# Patient Record
Sex: MALE | Race: WHITE | NOT HISPANIC OR LATINO | Employment: OTHER | URBAN - METROPOLITAN AREA
[De-identification: names, ages, dates, MRNs, and addresses within clinical notes are randomized per-mention and may not be internally consistent; named-entity substitution may affect disease eponyms.]

---

## 2020-03-20 ENCOUNTER — APPOINTMENT (INPATIENT)
Dept: MRI IMAGING | Facility: HOSPITAL | Age: 75
DRG: 065 | End: 2020-03-20
Payer: COMMERCIAL

## 2020-03-20 ENCOUNTER — APPOINTMENT (EMERGENCY)
Dept: CT IMAGING | Facility: HOSPITAL | Age: 75
DRG: 065 | End: 2020-03-20
Payer: COMMERCIAL

## 2020-03-20 ENCOUNTER — HOSPITAL ENCOUNTER (INPATIENT)
Facility: HOSPITAL | Age: 75
LOS: 5 days | DRG: 065 | End: 2020-03-25
Attending: EMERGENCY MEDICINE | Admitting: FAMILY MEDICINE
Payer: COMMERCIAL

## 2020-03-20 ENCOUNTER — APPOINTMENT (INPATIENT)
Dept: NON INVASIVE DIAGNOSTICS | Facility: HOSPITAL | Age: 75
DRG: 065 | End: 2020-03-20
Payer: COMMERCIAL

## 2020-03-20 ENCOUNTER — APPOINTMENT (EMERGENCY)
Dept: RADIOLOGY | Facility: HOSPITAL | Age: 75
DRG: 065 | End: 2020-03-20
Payer: COMMERCIAL

## 2020-03-20 DIAGNOSIS — R11.2 NAUSEA & VOMITING: ICD-10-CM

## 2020-03-20 DIAGNOSIS — R77.8 ELEVATED TROPONIN: ICD-10-CM

## 2020-03-20 DIAGNOSIS — I45.10 RBBB: ICD-10-CM

## 2020-03-20 DIAGNOSIS — I63.9 CVA (CEREBRAL VASCULAR ACCIDENT) (HCC): Primary | ICD-10-CM

## 2020-03-20 PROBLEM — I10 HTN (HYPERTENSION): Status: ACTIVE | Noted: 2020-03-20

## 2020-03-20 LAB
ANION GAP SERPL CALCULATED.3IONS-SCNC: 10 MMOL/L (ref 4–13)
ANION GAP SERPL CALCULATED.3IONS-SCNC: 11 MMOL/L (ref 4–13)
APTT PPP: 28 SECONDS (ref 23–37)
ATRIAL RATE: 68 BPM
BUN SERPL-MCNC: 14 MG/DL (ref 5–25)
BUN SERPL-MCNC: 15 MG/DL (ref 5–25)
CALCIUM SERPL-MCNC: 8.6 MG/DL (ref 8.3–10.1)
CALCIUM SERPL-MCNC: 9.2 MG/DL (ref 8.3–10.1)
CHLORIDE SERPL-SCNC: 101 MMOL/L (ref 100–108)
CHLORIDE SERPL-SCNC: 103 MMOL/L (ref 100–108)
CHOLEST SERPL-MCNC: 242 MG/DL (ref 50–200)
CK MB SERPL-MCNC: 1.3 % (ref 0–2.5)
CK MB SERPL-MCNC: 2.7 NG/ML (ref 0–5)
CK SERPL-CCNC: 207 U/L (ref 39–308)
CO2 SERPL-SCNC: 24 MMOL/L (ref 21–32)
CO2 SERPL-SCNC: 28 MMOL/L (ref 21–32)
CREAT SERPL-MCNC: 0.82 MG/DL (ref 0.6–1.3)
CREAT SERPL-MCNC: 0.86 MG/DL (ref 0.6–1.3)
ERYTHROCYTE [DISTWIDTH] IN BLOOD BY AUTOMATED COUNT: 13 % (ref 11.6–15.1)
ERYTHROCYTE [DISTWIDTH] IN BLOOD BY AUTOMATED COUNT: 13 % (ref 11.6–15.1)
EST. AVERAGE GLUCOSE BLD GHB EST-MCNC: 128 MG/DL
GFR SERPL CREATININE-BSD FRML MDRD: 84 ML/MIN/1.73SQ M
GFR SERPL CREATININE-BSD FRML MDRD: 86 ML/MIN/1.73SQ M
GLUCOSE SERPL-MCNC: 116 MG/DL (ref 65–140)
GLUCOSE SERPL-MCNC: 134 MG/DL (ref 65–140)
GLUCOSE SERPL-MCNC: 137 MG/DL (ref 65–140)
HBA1C MFR BLD: 6.1 %
HCT VFR BLD AUTO: 38.8 % (ref 36.5–49.3)
HCT VFR BLD AUTO: 42.5 % (ref 36.5–49.3)
HDLC SERPL-MCNC: 57 MG/DL
HGB BLD-MCNC: 12.5 G/DL (ref 12–17)
HGB BLD-MCNC: 13.6 G/DL (ref 12–17)
INR PPP: 1.02 (ref 0.84–1.19)
LDLC SERPL CALC-MCNC: 172 MG/DL (ref 0–100)
MCH RBC QN AUTO: 28.9 PG (ref 26.8–34.3)
MCH RBC QN AUTO: 29 PG (ref 26.8–34.3)
MCHC RBC AUTO-ENTMCNC: 32 G/DL (ref 31.4–37.4)
MCHC RBC AUTO-ENTMCNC: 32.2 G/DL (ref 31.4–37.4)
MCV RBC AUTO: 90 FL (ref 82–98)
MCV RBC AUTO: 90 FL (ref 82–98)
P AXIS: 60 DEGREES
PLATELET # BLD AUTO: 226 THOUSANDS/UL (ref 149–390)
PLATELET # BLD AUTO: 260 THOUSANDS/UL (ref 149–390)
PMV BLD AUTO: 10.2 FL (ref 8.9–12.7)
PMV BLD AUTO: 9.8 FL (ref 8.9–12.7)
POTASSIUM SERPL-SCNC: 3.5 MMOL/L (ref 3.5–5.3)
POTASSIUM SERPL-SCNC: 4.1 MMOL/L (ref 3.5–5.3)
PR INTERVAL: 150 MS
PROTHROMBIN TIME: 13.5 SECONDS (ref 11.6–14.5)
QRS AXIS: -72 DEGREES
QRSD INTERVAL: 144 MS
QT INTERVAL: 518 MS
QTC INTERVAL: 550 MS
RBC # BLD AUTO: 4.31 MILLION/UL (ref 3.88–5.62)
RBC # BLD AUTO: 4.7 MILLION/UL (ref 3.88–5.62)
SODIUM SERPL-SCNC: 138 MMOL/L (ref 136–145)
SODIUM SERPL-SCNC: 139 MMOL/L (ref 136–145)
T WAVE AXIS: 61 DEGREES
TRIGL SERPL-MCNC: 63 MG/DL
TROPONIN I SERPL-MCNC: 0.24 NG/ML
TROPONIN I SERPL-MCNC: 0.26 NG/ML
TROPONIN I SERPL-MCNC: 0.33 NG/ML
VENTRICULAR RATE: 68 BPM
WBC # BLD AUTO: 10.26 THOUSAND/UL (ref 4.31–10.16)
WBC # BLD AUTO: 9.72 THOUSAND/UL (ref 4.31–10.16)

## 2020-03-20 PROCEDURE — 99223 1ST HOSP IP/OBS HIGH 75: CPT | Performed by: PSYCHIATRY & NEUROLOGY

## 2020-03-20 PROCEDURE — 84484 ASSAY OF TROPONIN QUANT: CPT | Performed by: PHYSICIAN ASSISTANT

## 2020-03-20 PROCEDURE — 80048 BASIC METABOLIC PNL TOTAL CA: CPT | Performed by: PHYSICIAN ASSISTANT

## 2020-03-20 PROCEDURE — 82948 REAGENT STRIP/BLOOD GLUCOSE: CPT

## 2020-03-20 PROCEDURE — 93306 TTE W/DOPPLER COMPLETE: CPT | Performed by: INTERNAL MEDICINE

## 2020-03-20 PROCEDURE — 97535 SELF CARE MNGMENT TRAINING: CPT

## 2020-03-20 PROCEDURE — 80048 BASIC METABOLIC PNL TOTAL CA: CPT | Performed by: EMERGENCY MEDICINE

## 2020-03-20 PROCEDURE — 70498 CT ANGIOGRAPHY NECK: CPT

## 2020-03-20 PROCEDURE — 71045 X-RAY EXAM CHEST 1 VIEW: CPT

## 2020-03-20 PROCEDURE — 99285 EMERGENCY DEPT VISIT HI MDM: CPT

## 2020-03-20 PROCEDURE — 93010 ELECTROCARDIOGRAM REPORT: CPT | Performed by: INTERNAL MEDICINE

## 2020-03-20 PROCEDURE — 92610 EVALUATE SWALLOWING FUNCTION: CPT

## 2020-03-20 PROCEDURE — 85610 PROTHROMBIN TIME: CPT | Performed by: EMERGENCY MEDICINE

## 2020-03-20 PROCEDURE — 83036 HEMOGLOBIN GLYCOSYLATED A1C: CPT | Performed by: PHYSICIAN ASSISTANT

## 2020-03-20 PROCEDURE — 36415 COLL VENOUS BLD VENIPUNCTURE: CPT | Performed by: EMERGENCY MEDICINE

## 2020-03-20 PROCEDURE — 99223 1ST HOSP IP/OBS HIGH 75: CPT | Performed by: FAMILY MEDICINE

## 2020-03-20 PROCEDURE — 97530 THERAPEUTIC ACTIVITIES: CPT

## 2020-03-20 PROCEDURE — 82553 CREATINE MB FRACTION: CPT | Performed by: EMERGENCY MEDICINE

## 2020-03-20 PROCEDURE — 70551 MRI BRAIN STEM W/O DYE: CPT

## 2020-03-20 PROCEDURE — 85027 COMPLETE CBC AUTOMATED: CPT | Performed by: EMERGENCY MEDICINE

## 2020-03-20 PROCEDURE — 84484 ASSAY OF TROPONIN QUANT: CPT | Performed by: EMERGENCY MEDICINE

## 2020-03-20 PROCEDURE — 85730 THROMBOPLASTIN TIME PARTIAL: CPT | Performed by: EMERGENCY MEDICINE

## 2020-03-20 PROCEDURE — 96360 HYDRATION IV INFUSION INIT: CPT

## 2020-03-20 PROCEDURE — 82550 ASSAY OF CK (CPK): CPT | Performed by: EMERGENCY MEDICINE

## 2020-03-20 PROCEDURE — 70496 CT ANGIOGRAPHY HEAD: CPT

## 2020-03-20 PROCEDURE — 96374 THER/PROPH/DIAG INJ IV PUSH: CPT

## 2020-03-20 PROCEDURE — 80061 LIPID PANEL: CPT | Performed by: PHYSICIAN ASSISTANT

## 2020-03-20 PROCEDURE — 93005 ELECTROCARDIOGRAM TRACING: CPT

## 2020-03-20 PROCEDURE — 93306 TTE W/DOPPLER COMPLETE: CPT

## 2020-03-20 PROCEDURE — 85027 COMPLETE CBC AUTOMATED: CPT | Performed by: PHYSICIAN ASSISTANT

## 2020-03-20 PROCEDURE — 97163 PT EVAL HIGH COMPLEX 45 MIN: CPT

## 2020-03-20 PROCEDURE — 99285 EMERGENCY DEPT VISIT HI MDM: CPT | Performed by: EMERGENCY MEDICINE

## 2020-03-20 PROCEDURE — 97167 OT EVAL HIGH COMPLEX 60 MIN: CPT

## 2020-03-20 RX ORDER — METOPROLOL SUCCINATE 100 MG/1
100 TABLET, EXTENDED RELEASE ORAL DAILY
COMMUNITY
Start: 2019-07-25 | End: 2020-04-14 | Stop reason: HOSPADM

## 2020-03-20 RX ORDER — ASPIRIN 81 MG/1
81 TABLET, CHEWABLE ORAL DAILY
COMMUNITY
Start: 2017-04-27

## 2020-03-20 RX ORDER — CLOPIDOGREL BISULFATE 75 MG/1
75 TABLET ORAL DAILY
COMMUNITY
Start: 2019-07-25

## 2020-03-20 RX ORDER — ATORVASTATIN CALCIUM 40 MG/1
TABLET, FILM COATED ORAL
COMMUNITY
Start: 2019-01-29 | End: 2020-04-14 | Stop reason: HOSPADM

## 2020-03-20 RX ORDER — ASPIRIN 81 MG/1
81 TABLET ORAL DAILY
Status: DISCONTINUED | OUTPATIENT
Start: 2020-03-21 | End: 2020-03-25 | Stop reason: HOSPADM

## 2020-03-20 RX ORDER — CLOPIDOGREL BISULFATE 75 MG/1
75 TABLET ORAL DAILY
Status: DISCONTINUED | OUTPATIENT
Start: 2020-03-21 | End: 2020-03-25 | Stop reason: HOSPADM

## 2020-03-20 RX ORDER — CALCIUM CARBONATE 200(500)MG
1000 TABLET,CHEWABLE ORAL DAILY PRN
Status: DISCONTINUED | OUTPATIENT
Start: 2020-03-20 | End: 2020-03-25 | Stop reason: HOSPADM

## 2020-03-20 RX ORDER — ONDANSETRON 2 MG/ML
4 INJECTION INTRAMUSCULAR; INTRAVENOUS EVERY 6 HOURS PRN
Status: DISCONTINUED | OUTPATIENT
Start: 2020-03-20 | End: 2020-03-25 | Stop reason: HOSPADM

## 2020-03-20 RX ORDER — ACETAMINOPHEN 325 MG/1
650 TABLET ORAL EVERY 4 HOURS PRN
Status: DISCONTINUED | OUTPATIENT
Start: 2020-03-20 | End: 2020-03-25 | Stop reason: HOSPADM

## 2020-03-20 RX ORDER — SODIUM CHLORIDE 9 MG/ML
100 INJECTION, SOLUTION INTRAVENOUS CONTINUOUS
Status: DISCONTINUED | OUTPATIENT
Start: 2020-03-20 | End: 2020-03-21

## 2020-03-20 RX ORDER — CLOPIDOGREL BISULFATE 75 MG/1
300 TABLET ORAL ONCE
Status: COMPLETED | OUTPATIENT
Start: 2020-03-20 | End: 2020-03-20

## 2020-03-20 RX ORDER — ONDANSETRON 2 MG/ML
4 INJECTION INTRAMUSCULAR; INTRAVENOUS ONCE
Status: COMPLETED | OUTPATIENT
Start: 2020-03-20 | End: 2020-03-20

## 2020-03-20 RX ORDER — ONDANSETRON 2 MG/ML
1 INJECTION INTRAMUSCULAR; INTRAVENOUS ONCE
Status: COMPLETED | OUTPATIENT
Start: 2020-03-20 | End: 2020-03-20

## 2020-03-20 RX ORDER — ATORVASTATIN CALCIUM 40 MG/1
40 TABLET, FILM COATED ORAL
Status: DISCONTINUED | OUTPATIENT
Start: 2020-03-20 | End: 2020-03-22

## 2020-03-20 RX ORDER — LISINOPRIL 40 MG/1
40 TABLET ORAL DAILY
COMMUNITY
Start: 2019-07-25

## 2020-03-20 RX ADMIN — ENOXAPARIN SODIUM 40 MG: 40 INJECTION SUBCUTANEOUS at 08:24

## 2020-03-20 RX ADMIN — CLOPIDOGREL BISULFATE 300 MG: 75 TABLET ORAL at 14:47

## 2020-03-20 RX ADMIN — SODIUM CHLORIDE 1000 ML: 0.9 INJECTION, SOLUTION INTRAVENOUS at 00:57

## 2020-03-20 RX ADMIN — ATORVASTATIN CALCIUM 40 MG: 40 TABLET, FILM COATED ORAL at 17:33

## 2020-03-20 RX ADMIN — SODIUM CHLORIDE 100 ML/HR: 0.9 INJECTION, SOLUTION INTRAVENOUS at 12:48

## 2020-03-20 RX ADMIN — TRIMETHOBENZAMIDE HYDROCHLORIDE 200 MG: 100 INJECTION INTRAMUSCULAR at 01:44

## 2020-03-20 RX ADMIN — SODIUM CHLORIDE 100 ML/HR: 0.9 INJECTION, SOLUTION INTRAVENOUS at 02:40

## 2020-03-20 RX ADMIN — ONDANSETRON 4 MG: 2 INJECTION INTRAMUSCULAR; INTRAVENOUS at 18:37

## 2020-03-20 RX ADMIN — ONDANSETRON 4 MG: 2 INJECTION INTRAMUSCULAR; INTRAVENOUS at 00:36

## 2020-03-20 NOTE — PLAN OF CARE
Problem: Potential for Falls  Goal: Patient will remain free of falls  Description  INTERVENTIONS:  - Assess patient frequently for physical needs  -  Identify cognitive and physical deficits and behaviors that affect risk of falls  -  [place] fall precautions as indicated by assessment   - Educate patient/family on patient safety including physical limitations  - Instruct patient to call for assistance with activity based on assessment  - Modify environment to reduce risk of injury  - Consider OT/PT consult to assist with strengthening/mobility  Outcome: Progressing     Problem: Neurological Deficit  Goal: Neurological status is stable or improving  Description  Interventions:  - Monitor and assess patient's level of consciousness, motor function, sensory function, and level of assistance needed for ADLs  - Monitor and report changes from baseline  Collaborate with interdisciplinary team to initiate plan and implement interventions as ordered  - Provide and maintain a safe environment  - Consider seizure precautions  - Consider fall precautions  - Consider aspiration precautions  - Consider bleeding precautions  Outcome: Progressing     Problem: Activity Intolerance/Impaired Mobility  Goal: Mobility/activity is maintained at optimum level for patient  Description  Interventions:  - Assess and monitor patient  barriers to mobility and need for assistive/adaptive devices  - Assess patient's emotional response to limitations  - Collaborate with interdisciplinary team and initiate plans and interventions as ordered  - Encourage independent activity per ability   - Maintain proper body alignment  - Perform active/passive rom as tolerated/ordered    - Plan activities to conserve energy   - Turn patient as appropriate  Outcome: Progressing     Problem: Communication Impairment  Goal: Ability to express needs and understand communication  Description  Assess patient's communication skills and ability to understand information  Patient will demonstrate use of effective communication techniques, alternative methods of communication and understanding even if not able to speak  - Encourage communication and provide alternate methods of communication as needed  - Collaborate with case management/ for discharge needs  - Include patient/family/caregiver in decisions related to communication  Outcome: Progressing     Problem: Potential for Aspiration  Goal: Non-ventilated patient's risk of aspiration is minimized  Description  Assess and monitor vital signs, respiratory status, and labs (WBC)  Monitor for signs of aspiration (tachypnea, cough, rales, wheezing, cyanosis, fever)  - Assess and monitor patient's ability to swallow  - Place patient up in chair to eat if possible  - HOB up at 90 degrees to eat if unable to get patient up into chair   - Supervise patient during oral intake  - Instruct patient/ family to take small bites  - Instruct patient/ family to take small single sips when taking liquids  - Follow patient-specific strategies generated by speech pathologist   Outcome: Progressing     Problem: NEUROSENSORY - ADULT  Goal: Achieves stable or improved neurological status  Description  INTERVENTIONS  - Monitor and report changes in neurological status  - Monitor vital signs such as temperature, blood pressure, glucose, and any other labs ordered   - Initiate measures to prevent increased intracranial pressure  - Monitor for seizure activity and implement precautions if appropriate      Outcome: Progressing  Goal: Achieves maximal functionality and self care  Description  INTERVENTIONS  - Monitor swallowing and airway patency with patient fatigue and changes in neurological status  - Encourage and assist patient to increase activity and self care     - Encourage visually impaired, hearing impaired and aphasic patients to use assistive/communication devices  Outcome: Progressing Problem: CARDIOVASCULAR - ADULT  Goal: Absence of cardiac dysrhythmias or at baseline rhythm  Description  INTERVENTIONS:  - Continuous cardiac monitoring, vital signs, obtain 12 lead EKG if ordered  - Administer antiarrhythmic and heart rate control medications as ordered  - Monitor electrolytes and administer replacement therapy as ordered  Outcome: Progressing     Problem: GASTROINTESTINAL - ADULT  Goal: Minimal or absence of nausea and/or vomiting  Description  INTERVENTIONS:  - Administer IV fluids if ordered to ensure adequate hydration  - Maintain NPO status until nausea and vomiting are resolved  - Nasogastric tube if ordered  - Administer ordered antiemetic medications as needed  - Provide nonpharmacologic comfort measures as appropriate  - Advance diet as tolerated, if ordered  - Consider nutrition services referral to assist patient with adequate nutrition and appropriate food choices  Outcome: Progressing     Problem: METABOLIC, FLUID AND ELECTROLYTES - ADULT  Goal: Glucose maintained within target range  Description  INTERVENTIONS:  - Monitor Blood Glucose as ordered  - Assess for signs and symptoms of hyperglycemia and hypoglycemia  - Administer ordered medications to maintain glucose within target range  - Assess nutritional intake and initiate nutrition service referral as needed  Outcome: Progressing     Problem: PAIN - ADULT  Goal: Verbalizes/displays adequate comfort level or baseline comfort level  Description  Interventions:  - Encourage patient to monitor pain and request assistance  - Assess pain using appropriate pain scale  - Administer analgesics based on type and severity of pain and evaluate response  - Implement non-pharmacological measures as appropriate and evaluate response  - Consider cultural and social influences on pain and pain management  - Notify physician/advanced practitioner if interventions unsuccessful or patient reports new pain  Outcome: Progressing     Problem: DISCHARGE PLANNING  Goal: Discharge to home or other facility with appropriate resources  Description  INTERVENTIONS:  - Identify barriers to discharge w/patient and caregiver  - Arrange for needed discharge resources and transportation as appropriate  - Identify discharge learning needs (meds, wound care, etc )  - Arrange for interpretive services to assist at discharge as needed  - Refer to Case Management Department for coordinating discharge planning if the patient needs post-hospital services based on physician/advanced practitioner order or complex needs related to functional status, cognitive ability, or social support system  Outcome: Progressing     Problem: Knowledge Deficit  Goal: Patient/family/caregiver demonstrates understanding of disease process, treatment plan, medications, and discharge instructions  Description  Complete learning assessment and assess knowledge base    Interventions:  - Provide teaching at level of understanding  - Provide teaching via preferred learning methods  Outcome: Progressing

## 2020-03-20 NOTE — PLAN OF CARE
Problem: Potential for Falls  Goal: Patient will remain free of falls  Description  INTERVENTIONS:  - Assess patient frequently for physical needs  -  Identify cognitive and physical deficits and behaviors that affect risk of falls  -  [place] fall precautions as indicated by assessment   - Educate patient/family on patient safety including physical limitations  - Instruct patient to call for assistance with activity based on assessment  - Modify environment to reduce risk of injury  - Consider OT/PT consult to assist with strengthening/mobility  Outcome: Progressing     Problem: Neurological Deficit  Goal: Neurological status is stable or improving  Description  Interventions:  - Monitor and assess patient's level of consciousness, motor function, sensory function, and level of assistance needed for ADLs  - Monitor and report changes from baseline  Collaborate with interdisciplinary team to initiate plan and implement interventions as ordered  - Provide and maintain a safe environment  - Consider seizure precautions  - Consider fall precautions  - Consider aspiration precautions  - Consider bleeding precautions  Outcome: Progressing     Problem: Activity Intolerance/Impaired Mobility  Goal: Mobility/activity is maintained at optimum level for patient  Description  Interventions:  - Assess and monitor patient  barriers to mobility and need for assistive/adaptive devices  - Assess patient's emotional response to limitations  - Collaborate with interdisciplinary team and initiate plans and interventions as ordered  - Encourage independent activity per ability   - Maintain proper body alignment  - Perform active/passive rom as tolerated/ordered    - Plan activities to conserve energy   - Turn patient as appropriate  Outcome: Progressing     Problem: Communication Impairment  Goal: Ability to express needs and understand communication  Description  Assess patient's communication skills and ability to understand information  Patient will demonstrate use of effective communication techniques, alternative methods of communication and understanding even if not able to speak  - Encourage communication and provide alternate methods of communication as needed  - Collaborate with case management/ for discharge needs  - Include patient/family/caregiver in decisions related to communication  Outcome: Progressing     Problem: Potential for Aspiration  Goal: Non-ventilated patient's risk of aspiration is minimized  Description  Assess and monitor vital signs, respiratory status, and labs (WBC)  Monitor for signs of aspiration (tachypnea, cough, rales, wheezing, cyanosis, fever)  - Assess and monitor patient's ability to swallow  - Place patient up in chair to eat if possible  - HOB up at 90 degrees to eat if unable to get patient up into chair   - Supervise patient during oral intake  - Instruct patient/ family to take small bites  - Instruct patient/ family to take small single sips when taking liquids  - Follow patient-specific strategies generated by speech pathologist   Outcome: Progressing     Problem: NEUROSENSORY - ADULT  Goal: Achieves stable or improved neurological status  Description  INTERVENTIONS  - Monitor and report changes in neurological status  - Monitor vital signs such as temperature, blood pressure, glucose, and any other labs ordered   - Initiate measures to prevent increased intracranial pressure  - Monitor for seizure activity and implement precautions if appropriate      Outcome: Progressing  Goal: Achieves maximal functionality and self care  Description  INTERVENTIONS  - Monitor swallowing and airway patency with patient fatigue and changes in neurological status  - Encourage and assist patient to increase activity and self care     - Encourage visually impaired, hearing impaired and aphasic patients to use assistive/communication devices  Outcome: Progressing Problem: CARDIOVASCULAR - ADULT  Goal: Absence of cardiac dysrhythmias or at baseline rhythm  Description  INTERVENTIONS:  - Continuous cardiac monitoring, vital signs, obtain 12 lead EKG if ordered  - Administer antiarrhythmic and heart rate control medications as ordered  - Monitor electrolytes and administer replacement therapy as ordered  Outcome: Progressing     Problem: GASTROINTESTINAL - ADULT  Goal: Minimal or absence of nausea and/or vomiting  Description  INTERVENTIONS:  - Administer IV fluids if ordered to ensure adequate hydration  - Maintain NPO status until nausea and vomiting are resolved  - Nasogastric tube if ordered  - Administer ordered antiemetic medications as needed  - Provide nonpharmacologic comfort measures as appropriate  - Advance diet as tolerated, if ordered  - Consider nutrition services referral to assist patient with adequate nutrition and appropriate food choices  Outcome: Progressing     Problem: METABOLIC, FLUID AND ELECTROLYTES - ADULT  Goal: Glucose maintained within target range  Description  INTERVENTIONS:  - Monitor Blood Glucose as ordered  - Assess for signs and symptoms of hyperglycemia and hypoglycemia  - Administer ordered medications to maintain glucose within target range  - Assess nutritional intake and initiate nutrition service referral as needed  Outcome: Progressing     Problem: PAIN - ADULT  Goal: Verbalizes/displays adequate comfort level or baseline comfort level  Description  Interventions:  - Encourage patient to monitor pain and request assistance  - Assess pain using appropriate pain scale  - Administer analgesics based on type and severity of pain and evaluate response  - Implement non-pharmacological measures as appropriate and evaluate response  - Consider cultural and social influences on pain and pain management  - Notify physician/advanced practitioner if interventions unsuccessful or patient reports new pain  Outcome: Progressing     Problem: DISCHARGE PLANNING  Goal: Discharge to home or other facility with appropriate resources  Description  INTERVENTIONS:  - Identify barriers to discharge w/patient and caregiver  - Arrange for needed discharge resources and transportation as appropriate  - Identify discharge learning needs (meds, wound care, etc )  - Arrange for interpretive services to assist at discharge as needed  - Refer to Case Management Department for coordinating discharge planning if the patient needs post-hospital services based on physician/advanced practitioner order or complex needs related to functional status, cognitive ability, or social support system  Outcome: Progressing     Problem: Knowledge Deficit  Goal: Patient/family/caregiver demonstrates understanding of disease process, treatment plan, medications, and discharge instructions  Description  Complete learning assessment and assess knowledge base    Interventions:  - Provide teaching at level of understanding  - Provide teaching via preferred learning methods  Outcome: Progressing

## 2020-03-20 NOTE — ASSESSMENT & PLAN NOTE
Admit to med/surg on stroke pathway  Patient out of window for tPA  CTA shows severe focal stenosis of proximal left M!  Segment and severe stenosis of the distal right V4 segment  Continue ASA/plavix  Consult neuro

## 2020-03-20 NOTE — ED PROVIDER NOTES
History  Chief Complaint   Patient presents with    CVA/TIA-like Symptoms     Pt was found on floor by sister, last well known 6:30, left sided deficits, on thinners for hx of stroke     Patient presents for left-sided arm and leg numbness, tingling and weakness  Patient has a history of a prior stroke in 2016  He is on Plavix along with blood pressure medications for this  Patient's symptoms from that stroke completely resolved  Tonight he was bending over to fix a drain pipe when he had abrupt onset of left arm and leg numbness and tingling which quickly followed by weakness  Patient fell over to his left hand side  He denies being injured by that event but was unable to get up  He states that he was laying in his basement for 6 hours when his sister came to look for him and found him there  Patient denies any headaches or dizziness but does now have some nausea after the ambulance ride  Per [facility] records this was his d/c summary from his CVA in 2016:  Patient is a 65 y o  male that came to hospital after sister noticed some slurred speech and facial droop and admitted with acute CVA in R internal capsule and found to have likely HTN emergency/uegency on admit  Now started on asa and plavix due to severe intracranial atherosclerosis and plans d/c asa in 3 mnths and continue plavix  Also needs outpt Holter  Asymtomatic subclaviann stenosis with no e/o s/s of subclavian steal syndrome    Also with demand ischemia and prolonged qt on ekg but no cardiac s/s and peak troponin 0 13 and echo [facility] with EF 11% and diastolic dysfunction  Incidental finding of parotid mass on mri and dedicated neck CT s/o 10mm parotid mmass which may be LN vs mass, ?? Due to bad dentition and follow up ENT outpt  Also thyroid nodule on R and needs f/u US as outpt         History provided by:  Patient and EMS personnel   used: No    CVA/TIA-like Symptoms   Presenting symptoms: weakness    Presenting symptoms: no headaches    Onset quality:  Sudden  Duration:  6 hours  Timing:  Constant  Progression:  Unchanged  Associated symptoms: nausea and vomiting    Associated symptoms: no chest pain, no dizziness and no seizures        None       No past medical history on file  No past surgical history on file  No family history on file  I have reviewed and agree with the history as documented  No existing history information found  No existing history information found  Social History     Tobacco Use    Smoking status: Not on file   Substance Use Topics    Alcohol use: Not on file    Drug use: Not on file       Review of Systems   Respiratory: Negative for chest tightness and shortness of breath  Cardiovascular: Negative for chest pain  Gastrointestinal: Positive for nausea and vomiting  Negative for abdominal pain  Skin: Negative for color change and wound  Allergic/Immunologic: Negative for immunocompromised state  Neurological: Positive for weakness and numbness  Negative for dizziness, seizures, syncope, speech difficulty, light-headedness and headaches  All other systems reviewed and are negative  Physical Exam  Physical Exam   Constitutional: He is oriented to person, place, and time  He appears well-developed and well-nourished  HENT:   Head: Normocephalic and atraumatic  Mouth/Throat: Oropharynx is clear and moist    Eyes: Pupils are equal, round, and reactive to light  Conjunctivae and EOM are normal  Right eye exhibits no discharge  Left eye exhibits no discharge  No scleral icterus  Neck: Normal range of motion  Neck supple  Cardiovascular: Normal rate, regular rhythm, normal heart sounds and intact distal pulses  Exam reveals no friction rub  No murmur heard  Pulmonary/Chest: Effort normal and breath sounds normal  No respiratory distress  He has no wheezes  He has no rales  Abdominal: Soft  He exhibits no distension  There is no tenderness   There is no rebound and no guarding  Musculoskeletal: Normal range of motion  He exhibits no edema, tenderness or deformity  Neurological: He is alert and oriented to person, place, and time  A cranial nerve deficit is present  No sensory deficit  GCS eye subscore is 4  GCS verbal subscore is 5  GCS motor subscore is 6  Skin: Skin is warm and dry  Psychiatric: He has a normal mood and affect  Nursing note and vitals reviewed        Vital Signs  ED Triage Vitals [03/20/20 0019]   Temperature Pulse Respirations Blood Pressure SpO2   97 6 °F (36 4 °C) 67 20 127/96 96 %      Temp Source Heart Rate Source Patient Position - Orthostatic VS BP Location FiO2 (%)   Oral Monitor Lying Right arm --      Pain Score       --           Vitals:    03/20/20 0028 03/20/20 0030 03/20/20 0045 03/20/20 0100   BP: 136/95  144/98 (!) 185/79   Pulse: 65 84 86 86   Patient Position - Orthostatic VS: Lying Lying Lying Sitting         Visual Acuity  Visual Acuity      Most Recent Value   L Pupil Size (mm)  3   R Pupil Size (mm)  3          ED Medications  Medications   iohexol (OMNIPAQUE) 350 MG/ML injection (MULTI-DOSE) 85 mL (has no administration in time range)   sodium chloride 0 9 % bolus 1,000 mL (1,000 mL Intravenous New Bag 3/20/20 0057)   trimethobenzamide (TIGAN) IM injection 200 mg (has no administration in time range)   ondansetron (FOR EMS ONLY) (ZOFRAN) 4 mg/2 mL injection 4 mg (0 mg Does not apply Given to EMS 3/20/20 0028)   ondansetron (ZOFRAN) injection 4 mg (4 mg Intravenous Given 3/20/20 0036)       Diagnostic Studies  Results Reviewed     Procedure Component Value Units Date/Time    CKMB [[id number]]  (Normal) Collected:  03/20/20 0026    Lab Status:  Final result Specimen:  Blood from Arm, Left Updated:  03/20/20 0109     CK-MB Index 1 3 %      CK-MB 2 7 ng/mL     Basic metabolic panel [[id number]] Collected:  03/20/20 0026    Lab Status:  Final result Specimen:  Blood from Arm, Left Updated:  03/20/20 0109     Sodium 139 mmol/L Potassium 4 1 mmol/L      Chloride 101 mmol/L      CO2 28 mmol/L      ANION GAP 10 mmol/L      BUN 15 mg/dL      Creatinine 0 86 mg/dL      Glucose 134 mg/dL      Calcium 9 2 mg/dL      eGFR 84 ml/min/1 73sq m     Narrative:       [place] guidelines for Chronic Kidney Disease (CKD):     Stage 1 with normal or high GFR (GFR > 90 mL/min/1 73 square meters)    Stage 2 Mild CKD (GFR = 60-89 mL/min/1 73 square meters)    Stage 3A Moderate CKD (GFR = 45-59 mL/min/1 73 square meters)    Stage 3B Moderate CKD (GFR = 30-44 mL/min/1 73 square meters)    Stage 4 Severe CKD (GFR = 15-29 mL/min/1 73 square meters)    Stage 5 End Stage CKD (GFR <15 mL/min/1 73 square meters)  Note: GFR calculation is accurate only with a steady state creatinine    CK Total with Reflex CKMB [[id number]]  (Normal) Collected:  03/20/20 0026    Lab Status:  Final result Specimen:  Blood from Arm, Left Updated:  03/20/20 0108     Total  U/L     Fingerstick Glucose (POCT) [[id number]]  (Normal) Collected:  03/20/20 0057    Lab Status:  Final result Updated:  03/20/20 0058     POC Glucose 137 mg/dl     Troponin I [[id number]]  (Abnormal) Collected:  03/20/20 0026    Lab Status:  Final result Specimen:  Blood from Arm, Left Updated:  03/20/20 0052     Troponin I 0 24 ng/mL     Protime-INR [[id number]]  (Normal) Collected:  03/20/20 0026    Lab Status:  Final result Specimen:  Blood from Arm, Left Updated:  03/20/20 0046     Protime 13 5 seconds      INR 1 02    APTT [[id number]]  (Normal) Collected:  03/20/20 0026    Lab Status:  Final result Specimen:  Blood from Arm, Left Updated:  03/20/20 0046     PTT 28 seconds     CBC and Platelet [[id number]]  (Abnormal) Collected:  03/20/20 0026    Lab Status:  Final result Specimen:  Blood from Arm, Left Updated:  03/20/20 0045     WBC 10 26 Thousand/uL      RBC 4 70 Million/uL      Hemoglobin 13 6 g/dL      Hematocrit 42 5 %      MCV 90 fL      MCH 28 9 pg      MCHC 32 0 g/dL RDW 13 0 %      Platelets 859 Thousands/uL      MPV 10 2 fL                  X-ray chest 1 view portable   Final Result by Deejay Estrella MD (03/20 0104)      No focal consolidation, pleural effusion, or pneumothorax  Workstation performed: [id number]         CT stroke alert brain   Final Result by Allison Clark DO (03/20 0047)      No acute intracranial abnormality  Microangiopathic changes        Findings were directly discussed with Abe Mendez on 3/20/2020 12:43 AM       Workstation performed: [id number]         CTA stroke alert (head/neck)    (Results Pending)              Procedures  ECG 12 Lead Documentation Only  Date/Time: 3/20/2020 12:34 AM  Performed by: Idalia Chilel DO  Authorized by: Idalia Chilel DO     Indications / Diagnosis:  CVA  Patient location:  ED  Previous ECG:     Previous ECG:  Unavailable  Interpretation:     Interpretation: non-specific    Rate:     ECG rate:  68    ECG rate assessment: normal    Rhythm:     Rhythm: sinus rhythm    Ectopy:     Ectopy: none    QRS:     QRS intervals:  Normal  Conduction:     Conduction: abnormal      Abnormal conduction: complete RBBB    ST segments:     ST segments:  Non-specific  T waves:     T waves: non-specific               ED Course             Stroke Assessment     Row Name 03/20/20 0027             NIH Stroke Scale    Interval  Baseline      Level of Consciousness (1a )  0      LOC Questions (1b )  0      LOC Commands (1c )  0      Best Gaze (2 )  0      Visual (3 )  0      Facial Palsy (4 )  1      Motor Arm, Left (5a )  1      Motor Arm, Right (5b )  0      Motor Leg, Left (6a )  2      Motor Leg, Right (6b )  0      Limb Ataxia (7 )  1      Sensory (8 )  0      Best Language (9 )  0      Dysarthria (10 )  0      Extinction and Inattention (11 ) (Formerly Neglect)  0      Total  5                            MDM  Number of Diagnoses or Management Options  CVA (cerebral vascular accident) ([facility] [address] ): new and requires workup  Nausea & vomiting: new and requires workup  Diagnosis management comments: 12:27 AM  Patient arrived for neurologic deficits that started about 6 hours ago  Feel that patient is outside the stroke alert window for tPA  His NIH stroke scale is 5  Will discuss with Neurology but continue with stroke workup with CT and CTA  Neuro paged  12:32 AM  Spoke with Dr Tiffany Johnson from Neurology  She agrees with not calling a stroke alert at this time  She will follow with the imaging  If there is a major vessel occlusion will discuss again about interventional radiologist intervention  12:47 AM  Spoke with the radiologist   No bleed on CT head  CTA now pending  1:31 AM  CTA negative for his pathology today but does have some stenosis of the left M1 branch  Does not explain his stroke tonight but patient will need admission and further evaluation  Still nauseous so will give Tigan since patient has a history of prolonged QT         Amount and/or Complexity of Data Reviewed  Clinical lab tests: ordered and reviewed  Tests in the radiology section of CPT®: ordered and reviewed  Tests in the medicine section of CPT®: reviewed and ordered  Review and summarize past medical records: yes  Discuss the patient with other providers: yes          Disposition  Final diagnoses:   [facility]   Nausea & vomiting     Time reflects when diagnosis was documented in both MDM as applicable and the Disposition within this note     Time User Action Codes Description Comment    3/20/2020  1:31 AM Moira Beasley Add [I63 9] CVA (cerebral vascular accident) ([facility] [address] )     3/20/2020  1:31 AM Moira Beasley Add [R11 2] Nausea & vomiting       ED Disposition     ED Disposition Condition Date/Time Comment    Admit Stable Fri Mar 20, 2020  1:34 AM Case was discussed with KARIME and the patient's admission status was agreed to be Admission Status: inpatient status to the service of Dr Aguilar Coleman, [facility]         Follow-up Information    None         Patient's Medications    No medications on file     No discharge procedures on file      PDMP Review     None          ED Provider  Electronically Signed by           Mary Porras DO  03/20/20 0134

## 2020-03-20 NOTE — PLAN OF CARE
Problem: OCCUPATIONAL THERAPY ADULT  Goal: Performs self-care activities at highest level of function for planned discharge setting  See evaluation for individualized goals  Description  Treatment Interventions: ADL retraining, UE strengthening/ROM, Functional transfer training, Endurance training, Cognitive reorientation, Patient/family training, Equipment evaluation/education, Compensatory technique education, Energy conservation, Activityengagement          See flowsheet documentation for full assessment, interventions and recommendations  Note:   Limitation: Decreased ADL status, Decreased UE strength, Decreased Safe judgement during ADL, Decreased cognition, Decreased endurance, Decreased high-level ADLs, Decreased self-care trans, Decreased fine motor control, Decreased UE ROM, Decreased sensation, Non-func L UE  Prognosis: Good  Assessment: Pt seen for 25min tx session with focus on functional balance, functional mobility, ADL status, and education  Pt initially found trying to get OOB independently, bed alarm going off  Pt found incontinent of urine  Pt able to tolerate OOB mobility; sitting balance=f/f-, standing balance=p/+  Pt noted with L sided lean  Pt required heavy assistance with all functional mobility tasks  Pt able to demonstrate good STM(3/3 recall after 5mins)  Pt required some assistance with UE dressing  Pt continues to demonstrate appropriateness for inpt rehab to improve his overall level of independence  Will continue        OT Discharge Recommendation: Short Term Rehab  OT - OK to Discharge: (to rehab when medically stable)

## 2020-03-20 NOTE — PLAN OF CARE
Problem: OCCUPATIONAL THERAPY ADULT  Goal: Performs self-care activities at highest level of function for planned discharge setting  See evaluation for individualized goals  Description  Treatment Interventions: ADL retraining, UE strengthening/ROM, Functional transfer training, Endurance training, Cognitive reorientation, Patient/family training, Equipment evaluation/education, Compensatory technique education, Energy conservation, Activityengagement          See flowsheet documentation for full assessment, interventions and recommendations  Note:   Limitation: Decreased ADL status, Decreased UE strength, Decreased Safe judgement during ADL, Decreased cognition, Decreased endurance, Decreased high-level ADLs, Decreased self-care trans, Decreased fine motor control, Decreased UE ROM, Decreased sensation, Non-func L UE  Prognosis: Good  Assessment: Pt is a 66 y o  male seen for OT evaluation s/p admit to [facility] on [facility]/20/2020 w/ facial droop and dysarthria, Stroke ([facility] [address] )  Ct brain: No acute intracranial abnormality  CT head/neck: Severe focal stenosis of the proximal left M1 segment, Moderate calcified atherosclerotic disease at the bilateral ICA terminus, worse on the right which extends to and involves the supraclinoid portion  MRI pending  Comorbidities affecting pt's functional performance at time of assessment include: HTN  Personal factors affecting pt at time of IE include:lives alone  Prior to admission, pt was living alone and reports independent w/ functional transfers and mobility w/ no AD,independent w/ ADLs, independent w/ IADLs, driving   Upon evaluation: Pt requires MOD assist x2 supine<>sit bed mobility w/ increased time to complete, MOD assist x2 sit<>Stand w/ VCs for safety and L UE positioning on RW, MOD assist x2 side stepping to [facility] w/ RW and significant lean to the left side and unsteadiness and difficulty w/ L LE coordination, MAX assist LB ADLs, MOD assist UB ADLs, MAX assist toileting 2* the following deficits impacting occupational performance:decreased strength and endurance, L UE w/ decreased strength, impaired balance, impaired functional reach, L UE hypertonia, impaired coordination L UE w/ dysmetria and dysdiadochokinesia, significant left sided lean, flat affect, impaired insight and safety awareness, fall risk, nausea  L UE positioned on pillow and hand in extension and educated to keep using as much as possible (pt is R hand dominant) Pt BP: 146/105 and 142/77  Jason Quinteros Pt to benefit from continued skilled OT tx while in the hospital to address deficits as defined above and maximize level of functional independence w ADL's and functional mobility  Occupational Performance areas to address include: grooming, bathing/shower, toilet hygiene, dressing, health maintenance, functional mobility, clothing management, cleaning and meal prep, home safety education, formal cognitive assessment  From OT standpoint, recommendation at time of d/c would be short term rehab       OT Discharge Recommendation: Short Term Rehab  OT - OK to Discharge: (to rehab when medically stable)

## 2020-03-20 NOTE — PLAN OF CARE
Problem: PHYSICAL THERAPY ADULT  Goal: Performs mobility at highest level of function for planned discharge setting  See evaluation for individualized goals  Description  Treatment/Interventions: Functional transfer training, LE strengthening/ROM, Therapeutic exercise, Elevations, Endurance training, Patient/family training, Equipment eval/education, Bed mobility, Gait training, Compensatory technique education, Continued evaluation, OT  Equipment Recommended: Dejuan Romero       See flowsheet documentation for full assessment, interventions and recommendations  Note:   Prognosis: Guarded  Problem List: Decreased strength, Decreased range of motion, Decreased endurance, Impaired balance, Decreased mobility, Decreased coordination, Impaired sensation  Assessment: Brandy James is a 66 y o  male admitted to [place] on 3/20/2020 for Stroke [facility])  Pt  has a past medical history of Hypertension and Stroke ([facility] [address] )    PT was consulted and pt was seen on 3/20/2020 for mobility assessment and d/c planning  Pt presents high fall risk, monitoring abn vitals, PIV  Pt lives alone in a 2 5 story home with 7 SADE and FOS to second story bed and bath  Prior to admission pt required independent for transfers, elevations and ambulation using a no assistive device and indep for IADLs and ADLs  Pt is currently functioning at a moderate assistance x2 level for bed mobility, moderate assistance x2 level for transfers, moderate assistance x2 level for ambulation with Rolling Walker  Pt demonstrated significant L sided lean during session contributing to poor balance and increased physical A to safely perform mobility tasks  Provided pt w verbal and tactile cuing to correct static sitting balance- pt verbalize understanding however difficulty sustaining midline posture  Frequent cuing needed to correct L lean   Performed side stepping at EOB due to safety concerns and poor balance; ataxic gait noted w poor sequencing w RW and physical A to manipulate AD  Pt will benefit from continued skilled IP PT to address the above mentioned impairments  in order to maximize recovery and increase functional independence when completing mobility and ADLs  Currently PT recommendations for DME include RW  At this time PT recommendations for d/c are STR  End of session pt positioned in bed to prevent L lean, bed alarm engaged, call bell and all needs in reach of RUE  Barriers to Discharge: Inaccessible home environment, Decreased caregiver support  Barriers to Discharge Comments: +steps, lives alone  Recommendation: Short-term skilled PT     PT - OK to Discharge: Yes    See flowsheet documentation for full assessment

## 2020-03-20 NOTE — OCCUPATIONAL THERAPY NOTE
OccupationalTherapy Progress Note(time=1400-1425)     Patient Name: Yola MARTINEZ Date: 3/20/2020  Problem List  Principal Problem:    Stroke [facility])  Active Problems:    HTN (hypertension)    Elevated troponin           Subjective:     03/20/20 1425   Restrictions/Precautions   Weight Bearing Precautions Per Order No   Other Precautions Fall Risk;Telemetry;Multiple lines   Pain Assessment   Pain Assessment Tool FLACC   Pain Rating: FLACC (Rest) - Face 0   Pain Rating: FLACC (Rest) - Legs 0   Pain Rating: FLACC (Rest) - Activity 0   Pain Rating: FLACC (Rest) - Cry 0   Pain Rating: FLACC (Rest) - Consolability 0   Score: FLACC (Rest) 0   ADL   Where Assessed Supine, bed   UB Dressing Assistance 4  Minimal Assistance   UB Dressing Deficit Thread RUE; Thread LUE   Functional Standing Tolerance   Time 30-60secs   Bed Mobility   Rolling R 4  Minimal assistance   Additional items Assist x 1; Increased time required;Verbal cues;LE management   Rolling L 4  Minimal assistance   Additional items Assist x 1; Increased time required;Verbal cues;LE management   Supine to Sit 3  Moderate assistance   Additional items Assist x 1; Increased time required;Verbal cues;LE management   Sit to Supine 3  Moderate assistance   Additional items Assist x 1; Increased time required;Verbal cues;LE management   Transfers   Sit to Stand 3  Moderate assistance   Additional items Assist x 1; Increased time required;Verbal cues   Stand to Sit 3  Moderate assistance   Additional items Assist x 1; Increased time required;Verbal cues   Functional Mobility   Functional Mobility 2  Maximal assistance   Additional Comments x1   Additional items Rolling walker   Cognition   Overall Cognitive Status Impaired   Arousal/Participation Alert   Attention Attends with cues to redirect   Orientation Level Oriented X4   Memory Decreased recall of precautions   Following Commands Follows one step commands without difficulty   Comments limited judgement/safety noted   Activity Tolerance   Activity Tolerance Patient limited by fatigue   Medical Staff Made Aware nsg   Assessment   Assessment Pt seen for 25min tx session with focus on functional balance, functional mobility, ADL status, and education  Pt initially found trying to get OOB independently, bed alarm going off  Pt found incontinent of urine  Pt able to tolerate OOB mobility; sitting balance=f/f-, standing balance=p/+  Pt noted with L sided lean  Pt required heavy assistance with all functional mobility tasks  Pt able to demonstrate good STM(3/3 recall after 5mins)  Pt required some assistance with UE dressing  Pt continues to demonstrate appropriateness for inpt rehab to improve his overall level of independence  Will continue  Plan   Treatment Interventions ADL retraining;Functional transfer training;UE strengthening/ROM; Endurance training;Cognitive reorientation;Patient/family training;Equipment evaluation/education; Compensatory technique education   Goal Expiration Date 04/03/20   OT Treatment Day 1   OT Frequency 3-5x/wk   Recommendation   OT Discharge Recommendation Short Term Rehab   Barthel Index   Feeding 5   Bathing 0   Grooming Score 0   Dressing Score 5   Bladder Score 0   Bowels Score 10   Toilet Use Score 5   Transfers (Bed/Chair) Score 5   Mobility (Level Surface) Score 0   Stairs Score 0   Barthel Index Score 30   Harlan Gorman OT

## 2020-03-20 NOTE — OCCUPATIONAL THERAPY NOTE
Occupational Therapy Evaluation     Patient Name: Marisol CUEVA Date: 3/20/2020  Problem List  Principal Problem:    Stroke [facility])  Active Problems:    HTN (hypertension)    Elevated troponin    Past Medical History  Past Medical History:   Diagnosis Date    Hypertension     Stroke ([address] )     2017     Past Surgical History  History reviewed  No pertinent surgical history  03/20/20 1027   Note Type   Note type Eval/Treat   Restrictions/Precautions   Weight Bearing Precautions Per Order No   Other Precautions Chair Alarm; Bed Alarm;Cognitive; Fall Risk;Multiple lines;Telemetry; Impulsive  (significant left sided lean)   Pain Assessment   Pain Assessment Tool Pain Assessment not indicated - pt denies pain   Pain Score No Pain   Home Living   Type of Home House   Home Layout Two level;Stairs to enter with rails;Bed/bath upstairs; Laundry in basement  (7 SADE)   Bathroom Shower/Tub Tub/shower unit   Bathroom Toilet Standard   Bathroom Equipment Grab bars in shower; Shower chair   2020 [facility]   Additional Comments pt reports lives alone and no use of DME for functional mobility in the home, reports bathroom 2nd floor no BSC   Prior Function   Level of [place] Independent with ADLs and functional mobility   Lives With Alone   Receives Help From Family  (sister)   ADL Assistance Independent   IADLs Independent   Falls in the last 6 months 1 to 4   Vocational Retired   Comments pt driving PTA, reports very active PTA, sister is supportive and able to help him if needed   Lifestyle   Autonomy per pt independent w/ ADLs, independent w/ functional transfers and mobility w/ no AD, independent w/ IADLs, driving   Reciprocal Relationships sister   Service to Others retired  at [employer]   Subjective   Subjective "I feel sick to my stomach"   ADL   Where Assessed Edge of bed   Eating Assistance 4  Minimal Assistance   Grooming Assistance 4  Minimal Assistance   UB Bathing Assistance 3  Moderate Assistance   LB Bathing Assistance 2  Maximal Assistance   UB Dressing Assistance 3  Moderate Assistance   LB Dressing Assistance 2  Maximal Assistance   Toileting Assistance  2  Maximal Assistance   Bed Mobility   Supine to Sit 3  Moderate assistance   Additional items Assist x 2; Increased time required;Verbal cues;LE management; Bedrails;HOB elevated   Sit to Supine 3  Moderate assistance   Additional items Assist x 2; Increased time required;Verbal cues;LE management; Bedrails   Additional Comments increased time to complete, cues for safety and positioning; lateral lean to left side while seated requiring min-mod assist to maintain balance at times   Transfers   Sit to Stand 3  Moderate assistance   Additional items Assist x 2; Increased time required;Verbal cues; Bedrails  (lean to left side, assist to position L UE on RW)   Stand to Sit 3  Moderate assistance   Additional items Assist x 2; Increased time required;Verbal cues; Bedrails   Additional Comments cues for hand placement and positioning, assist to position L UE on RW and maintain position on RW   Functional Mobility   Functional Mobility 3  Moderate assistance   Additional Comments assist x2 w/ difficulty advancing L LE and lean to left side, assist to keep L hand positioned on RW   Additional items Rolling walker   Balance   Static Sitting Poor +   Dynamic Sitting Poor   Static Standing Poor -   Dynamic Standing Poor -   Activity Tolerance   Activity Tolerance Patient limited by fatigue;Treatment limited secondary to medical complications (Comment)  (nausea)   Nurse Made Aware appropriate to see per RNAmi aware of pt w/ nausea and significant lean to left side   RUE Assessment   RUE Assessment WFL  (4/5)   LUE Assessment   LUE Assessment X   LUE Overall AROM   L Shoulder Flexion WFL    L Shoulder ABduction WFL   LUE Strength   LUE Overall Strength Deficits L Shoulder Flexion 3/5   L Shoulder ABduction 3/5   L Elbow Flexion 3+/5   L Elbow Extension 3+/5   LUE Tone   LUE Tone Hypertonic  (flexor contracture at times, cues hand extended)   Hand Function   Gross Motor Coordination   (impaired L UE w/ dysmetria, R UE WFL)   Fine Motor Coordination Functional  (R UE, L UE impaired, dysdiadochokinesia,)   Sensation   Light Touch Partial deficits in the LUE   Additional Comments reports numbness in fingertips   Proprioception   Proprioception No apparent deficits   Vision-Basic Assessment   Current Vision No visual deficits   Vision - Complex Assessment   Ocular Range of Motion WFL   Acuity Able to read clock/calendar on wall without difficulty   Perception   Inattention/Neglect Appears intact   Cognition   Overall Cognitive Status WFL   Arousal/Participation Responsive; Cooperative   Attention Attends with cues to redirect   Orientation Level Oriented to person;Oriented to place;Oriented to time;Oriented to situation;Oriented X4   Memory Decreased recall of precautions   Following Commands Follows one step commands with increased time or repetition   Comments pt w/ flat affect, increased processing time   Assessment   Limitation Decreased ADL status; Decreased UE strength;Decreased Safe judgement during ADL;Decreased cognition;Decreased endurance;Decreased high-level ADLs; Decreased self-care trans;Decreased fine motor control;Decreased UE ROM; Decreased sensation; Non-func L UE   Prognosis Good   Assessment Pt is a 66 y o  male seen for OT evaluation s/p admit to [facility] on 3/20/2020 w/ facial droop and dysarthria, Stroke ([facility] [address] )  Ct brain: No acute intracranial abnormality  CT head/neck: Severe focal stenosis of the proximal left M1 segment, Moderate calcified atherosclerotic disease at the bilateral ICA terminus, worse on the right which extends to and involves the supraclinoid portion  MRI pending   Comorbidities affecting pt's functional performance at time of assessment include: HTN  Personal factors affecting pt at time of IE include:lives alone  Prior to admission, pt was living alone and reports independent w/ functional transfers and mobility w/ no AD,independent w/ ADLs, independent w/ IADLs, driving  Upon evaluation: Pt requires MOD assist x2 supine<>sit bed mobility w/ increased time to complete, MOD assist x2 sit<>Stand w/ VCs for safety and L UE positioning on RW, MOD assist x2 side stepping to [facility] w/ RW and significant lean to the left side and unsteadiness and difficulty w/ L LE coordination, MAX assist LB ADLs, MOD assist UB ADLs, MAX assist toileting 2* the following deficits impacting occupational performance:decreased strength and endurance, L UE w/ decreased strength, impaired balance, impaired functional reach, L UE hypertonia, impaired coordination L UE w/ dysmetria and dysdiadochokinesia, significant left sided lean, flat affect, impaired insight and safety awareness, fall risk, nausea  L UE positioned on pillow and hand in extension and educated to keep using as much as possible (pt is R hand dominant) Pt BP: 146/105 and 142/77  Gio Olsonoked Pt to benefit from continued skilled OT tx while in the hospital to address deficits as defined above and maximize level of functional independence w ADL's and functional mobility  Occupational Performance areas to address include: grooming, bathing/shower, toilet hygiene, dressing, health maintenance, functional mobility, clothing management, cleaning and meal prep, home safety education, formal cognitive assessment  From OT standpoint, recommendation at time of d/c would be short term rehab  Goals   Patient Goals "to get better"   LTG Time Frame 10-14   Long Term Goal please see below goals   Plan   Treatment Interventions ADL retraining;UE strengthening/ROM; Functional transfer training; Endurance training;Cognitive reorientation;Patient/family training;Equipment evaluation/education; Compensatory technique education; Energy conservation; Activityengagement   Goal Expiration Date 04/03/20   OT Frequency 3-5x/wk   Recommendation   OT Discharge Recommendation Short Term Rehab   OT - OK to Discharge   (to rehab when medically stable)   Barthel Index   Feeding 5   Bathing 0   Grooming Score 0   Dressing Score 0   Bladder Score 5   Bowels Score 10   Toilet Use Score 5   Transfers (Bed/Chair) Score 5   Mobility (Level Surface) Score 0   Stairs Score 0   Barthel Index Score 30   Modified Raiford Scale   Modified Debbie Scale 4     Occupational Therapy Goals to be met in 10-14 days:  1) Pt will improve activity tolerance to G for 30 min txment sessions to enhance ADLs  2) Pt will complete ADLs/self care w/ min assist  3) Pt will complete toileting w/ min assist w/ G hygiene/thoroughness using DME PRN  4) Pt will improve functional transfers on/off all surfaces using DME PRN w/ G balance/safety including toileting w/ min assist  5) Pt will improve fx'l mobility during I/ADl/leisure tasks using DME PRN w/ g balance/safety w/ min assist  6) Pt will engage in ongoing cognitive assessment w/ G participation to A w/ safe d/c planning/recommendations  7) Pt will demonstrate G carryover of pt/caregiver education and training as appropriate w/ mod I  w/ G tolerance  8) Pt will engage in depression screen/leisure interest checklist w/ G participation to monitor s/s depression and ID 3 positive coping strategies to A w/ emotional regulation and management  9) Pt will demonstrate 100% carryover of E C  techniques w/ mod I t/o fx'l I/ADL/leisure tasks w/o cues s/p skilled education  10) Pt will demonstrate improved bed mobility to MIN assist to enhance safety in home  11) Pt will demonstrate improved standing tolerance to 3-5 minutes during functional tasks w/ Fair + dynamic standing balance to enhance ADL performance  12) Pt will demonstrate improved L UE strength by 1 MMT grade and improved coordination L UE w/ decreased dysmetria to enhance ADLS and functional transfers     Documentation completed by: Teresa Piña MS, OTR/L

## 2020-03-20 NOTE — QUICK NOTE
Carolyn Oreilly 66 y o  male MRN: [id number]  Unit/Bed#: ED 23 Encounter: [id number]      Assessment/Plan   Will admit patient to the hospital for stroke workup        HPI: Carolyn Oreilly is a 66y o  year old (handedness not determinable) male who presents with left-sided arm/leg numbness and weakness  Patient was found down by his sister  Suspected symptoms started after 6:30 p m     NIH stroke scale of 5      History of stroke May 4th, 2016, came in with slurred speech and left facial droop  At that time patient was found to have severe intracranial atherosclerosis  Was on aspirin after this for 3 months with Plavix  After that Plavix was continued  - echo LVH with EF 55% and diastolic dysfunction    Other medical illnesses:  -Diabetes mellitus type 2  -Thyroid nodules  -Lesion of the parotid gland  -Essential hypertension  -Hyperlipidemia  - prolonged qt on ekg   - Asymtomatic subclaviann stenosis with no e/o s/s of subclavian steal syndrome     -Tobacco use:  1 pack per day times 30 years- Quit and January 2000    Objective   Vitals:Blood pressure 136/95, pulse 65, temperature 97 6 °F (36 4 °C), temperature source Oral, resp  rate 18, weight 70 6 kg (155 lb 10 3 oz), SpO2 96 %  ,There is no height or weight on file to calculate BMI  Lab Results: I have personally reviewed pertinent reports  Imaging Studies: I have personally reviewed pertinent reports  EKG, Pathology, and Other Studies: I have personally reviewed pertinent reports

## 2020-03-20 NOTE — CONSULTS
Consultation - Neurology   Brinda Sutton 66 y o  male MRN: [id number]  Unit/Bed#: E4 -01 Encounter: [id number]    Assessment/Plan   1)  L arm and leg numbness and weakness, found down - very likely indicative of acute CVA in setting of poorly controlled vascular risk factors  -MRI brain pending   -CTA head and neck demonstrates severe focal stenosis at the proximal left M1 segment  Severe stenosis of the distal right V4 segment  Moderate calcified atherosclerotic disease in bilateral ICA terminus, luminal irregularity with stenosis at the bilateral P1 and P2 segments  No overt hemodynamically significant stenosis, occlusion or dissection of the carotid or vertebral arteries  -CT head demonstrated no acute intracranial abnormality   -echo pending   -tele   -HbA1c 6 1, lipid profile with cholesterol 242,    -continue ASA and Plavix   -continue statin   -PT/OT/speech   -will continue follow closely, please monitor exam and notify with changes    History of Present Illness     Reason for Consult / Principal Problem:  CVA  Hx and PE limited by:  None  HPI: Brinda Sutton is a 66 y o   male with CVA in 2016 due to severe intracranial stay atherosclerosis, no residual deficits, on Plavix, DM 2, parotid lesion, HTN, HLD, QT prolongation, asymptomatic subclavian stenosis without subclavian steal,  who presents to [facility] Department after being found down  Prior to admission, the patient was in the basement of his home fixing a drain pipe when he had abrupt onset of left arm and leg numbness and tingling followed by weakness  The patient felt was left-hand side and was unable to get up  The patient was lying in the basement for approximately 6 hours when he was found by his sister  A stroke alert was not initiated secondary to being outside of the tPA window    CTA head and neck demonstrated severe focal stenosis of the proximal left M1 segment severe stenosis of the distal right V4 segment  The patient was given aspirin in addition to Plavix admitted to the stroke pathway  No acute events overnight  On exam today, the patient is resting comfortably in bed in no acute distress  A 12 point review systems was completed and is positive for nausea, sensory deficit on the left upper and lower extremity, and left upper and lower extremity weakness  Patient's neurological exam is significant for weakness and sensory deficit of the left face, arm and leg  Remainder of neurological exam is detailed below  Inpatient consult to Neurology  Consult performed by: Elizabeth Dodd PA-C  Consult ordered by: Reji Mustafa PA-C          Review of Systems   See HPI     Historical Information   Past Medical History:   Diagnosis Date    Hypertension     Stroke [facility])     2017     History reviewed  No pertinent surgical history  Social History   Social History     Substance and Sexual Activity   Alcohol Use Never    Frequency: Never    Drinks per session: Patient refused    Binge frequency: Never     Social History     Substance and Sexual Activity   Drug Use Never     E-Cigarette/Vaping     E-Cigarette/Vaping Substances     Social History     Tobacco Use   Smoking Status Former Smoker    Packs/day: 1 00    Types: Cigarettes    Start date: 1/1/1969    Last attempt to quit: 1/1/2005    Years since quitting: 15 2   Smokeless Tobacco Never Used     Family History: non-contributory    Review of previous medical records was completed       Meds/Allergies   Scheduled Meds:  Current Facility-Administered Medications:  acetaminophen 650 mg Oral Q4H PRN Mendoza Life, PA-C    calcium carbonate 1,000 mg Oral Daily PRN Mendoza Life, PA-C    enoxaparin 40 mg Subcutaneous Daily Mendoza Life, PA-C    iohexol 85 mL Intravenous Once in imaging Wanna Libman , DO    ondansetron 4 mg Intravenous Q6H PRN Mendoza Life, PA-C    sodium chloride 100 mL/hr Intravenous Continuous Mendoza Life, PA-C Last Rate: 100 mL/hr (03/20/20 0240)     Continuous Infusions:  sodium chloride 100 mL/hr Last Rate: 100 mL/hr (03/20/20 0240)     PRN Meds:   acetaminophen    calcium carbonate    iohexol    ondansetron      No Known Allergies    Objective   Vitals:Blood pressure 129/59, pulse 81, temperature 98 6 °F (37 °C), temperature source Temporal, resp  rate 18, height 5' 10" (1 778 m), weight 70 4 kg (155 lb 3 3 oz), SpO2 96 %  ,Body mass index is 22 27 kg/m²  Intake/Output Summary (Last 24 hours) at 3/20/2020 0917  Last data filed at 3/20/2020 0500  Gross per 24 hour   Intake    Output 250 ml   Net -250 ml       Invasive Devices: Invasive Devices     Peripheral Intravenous Line            Peripheral IV 03/20/20 Left Antecubital less than 1 day    Peripheral IV 03/20/20 Right Antecubital less than 1 day                Physical Exam   Constitutional: He is oriented to person, place, and time  He appears well-developed and well-nourished  No distress  Eyes: Conjunctivae are normal  Right eye exhibits no discharge  Left eye exhibits no discharge  No scleral icterus  Neck: Normal range of motion  Neck supple  Pulmonary/Chest: Effort normal  No respiratory distress  Musculoskeletal: He exhibits no edema or deformity  Neurological: He is oriented to person, place, and time  Skin: Skin is warm and dry  No rash noted  He is not diaphoretic  No erythema  No pallor  Psychiatric: He has a normal mood and affect  His speech is normal and behavior is normal    Nursing note and vitals reviewed  Neurologic Exam     Mental Status   Oriented to person, place, and time  Follows 2 step commands  Attention: normal  Concentration: normal    Speech: speech is normal   Level of consciousness: alert  Knowledge: good  Normal comprehension  Cranial Nerves   Cranial nerves II through XII intact     With exception of left central facial droop, and left facial sensory deficit     Motor Exam Patient with clear drift of left upper and lower extremity, at least 3/5     Sensory Exam   Decreased sensation left upper and lower extremity     Gait, Coordination, and Reflexes     Gait  Gait: (Deferred for safety)    Tremor   Resting tremor: absentPatient with ataxia not out of proportion to weakness on left upper extremity       Lab Results: I have personally reviewed pertinent reports       Recent Results (from the past 24 hour(s))   ECG 12 lead    Collection Time: 03/20/20 12:24 AM   Result Value Ref Range    Ventricular Rate 68 BPM    Atrial Rate 68 BPM    AL Interval 150 ms    QRSD Interval 144 ms    QT Interval 518 ms    QTC Interval 550 ms    P Axis 60 degrees    QRS Axis -72 degrees    T Wave Axis 61 degrees   Basic metabolic panel    Collection Time: 03/20/20 12:26 AM   Result Value Ref Range    Sodium 139 136 - 145 mmol/L    Potassium 4 1 3 5 - 5 3 mmol/L    Chloride 101 100 - 108 mmol/L    CO2 28 21 - 32 mmol/L    ANION GAP 10 4 - 13 mmol/L    BUN 15 5 - 25 mg/dL    Creatinine 0 86 0 60 - 1 30 mg/dL    Glucose 134 65 - 140 mg/dL    Calcium 9 2 8 3 - 10 1 mg/dL    eGFR 84 ml/min/1 73sq m   CBC and Platelet    Collection Time: 03/20/20 12:26 AM   Result Value Ref Range    WBC 10 26 (H) 4 31 - 10 16 Thousand/uL    RBC 4 70 3 88 - 5 62 Million/uL    Hemoglobin 13 6 12 0 - 17 0 g/dL    Hematocrit 42 5 36 5 - 49 3 %    MCV 90 82 - 98 fL    MCH 28 9 26 8 - 34 3 pg    MCHC 32 0 31 4 - 37 4 g/dL    RDW 13 0 11 6 - 15 1 %    Platelets 594 920 - 774 Thousands/uL    MPV 10 2 8 9 - 12 7 fL   Protime-INR    Collection Time: 03/20/20 12:26 AM   Result Value Ref Range    Protime 13 5 11 6 - 14 5 seconds    INR 1 02 0 84 - 1 19   APTT    Collection Time: 03/20/20 12:26 AM   Result Value Ref Range    PTT 28 23 - 37 seconds   Troponin I    Collection Time: 03/20/20 12:26 AM   Result Value Ref Range    Troponin I 0 24 (H) <=0 04 ng/mL   CK Total with Reflex CKMB    Collection Time: 03/20/20 12:26 AM   Result Value Ref Range    Total  39 - 308 U/L   CKMB    Collection Time: 03/20/20 12:26 AM   Result Value Ref Range    CK-MB Index 1 3 0 0 - 2 5 %    CK-MB 2 7 0 0 - 5 0 ng/mL   Fingerstick Glucose (POCT)    Collection Time: 03/20/20 12:57 AM   Result Value Ref Range    POC Glucose 137 65 - 140 mg/dl   Troponin I    Collection Time: 03/20/20  3:37 AM   Result Value Ref Range    Troponin I 0 26 (H) <=0 04 ng/mL   Lipid Panel with Direct LDL reflex    Collection Time: 03/20/20  6:35 AM   Result Value Ref Range    Cholesterol 242 (H) 50 - 200 mg/dL    Triglycerides 63 <=150 mg/dL    HDL, Direct 57 >=40 mg/dL    LDL Calculated 172 (H) 0 - 100 mg/dL   Basic metabolic panel    Collection Time: 03/20/20  6:35 AM   Result Value Ref Range    Sodium 138 136 - 145 mmol/L    Potassium 3 5 3 5 - 5 3 mmol/L    Chloride 103 100 - 108 mmol/L    CO2 24 21 - 32 mmol/L    ANION GAP 11 4 - 13 mmol/L    BUN 14 5 - 25 mg/dL    Creatinine 0 82 0 60 - 1 30 mg/dL    Glucose 116 65 - 140 mg/dL    Calcium 8 6 8 3 - 10 1 mg/dL    eGFR 86 ml/min/1 73sq m   CBC (With Platelets)    Collection Time: 03/20/20  6:35 AM   Result Value Ref Range    WBC 9 72 4 31 - 10 16 Thousand/uL    RBC 4 31 3 88 - 5 62 Million/uL    Hemoglobin 12 5 12 0 - 17 0 g/dL    Hematocrit 38 8 36 5 - 49 3 %    MCV 90 82 - 98 fL    MCH 29 0 26 8 - 34 3 pg    MCHC 32 2 31 4 - 37 4 g/dL    RDW 13 0 11 6 - 15 1 %    Platelets 460 722 - 277 Thousands/uL    MPV 9 8 8 9 - 12 7 fL   Troponin I    Collection Time: 03/20/20  6:35 AM   Result Value Ref Range    Troponin I 0 33 (H) <=0 04 ng/mL   ]    Imaging Studies: I have personally reviewed pertinent reports  and I have personally reviewed pertinent films in PACS  EKG, Pathology, and Other Studies: I have personally reviewed pertinent reports      VTE Prophylaxis: Sequential compression device (Venodyne)  and Enoxaparin (Lovenox)    Code Status: Level 1 - Full Code  Advance Directive and Living Will:      Power of :    POLST:

## 2020-03-20 NOTE — SPEECH THERAPY NOTE
Speech Language/Pathology  Speech/Language Pathology  Assessment    Patient Name: Can Kate  JNLCQ'S Date: 3/20/2020     Problem List  Principal Problem:    Stroke [facility])  Active Problems:    HTN (hypertension)    Elevated troponin    Past Medical History  Past Medical History:   Diagnosis Date    Hypertension     Stroke ([facility] [address] )     2017     Past Surgical History  History reviewed  No pertinent surgical history  Bedside Swallow Evaluation:    Summary:  Pt presents w/ c/o nausea  Denied headache  agreeable to gingerale and tolerated multiple sips w/ prompt swallow and w/out s/s aspiration  Brought crackers and jello for him to try but he refused  Left labial droop and tongue deviation, suspect his baseline  he denied any neuro changes except for left fingers tingling  Recommendations:  Diet: clears to begin, he denied any h/o swallowing difficulty  Liquid:thin  Meds:as tolerated/desired  Supervision: intermittent  Positioning:Upright  Strategies: Pt to take PO/Meds only when fully alert and upright  Oral care  Aspiration precautions  Reflux precautions  Therapy Prognosis:favorable  Prognosis considerations:suspect swallow is close to or at baseline but limited assessment  Frequency: ? 1-2x or as needed    Goal(s): 1  Pt will tolerate least restrictive diet w/out s/s aspiration or oral/pharyngeal difficulties  2 Pt will will effectively masticate and transfer solids w/out s/s dysphagia/aspiratrion  3 Pt will tolerate thin liquids w/out s/s aspiration       Patient's goal: wanted to lie flatter    Consider consult w/:  Nutrition    Reason for consult:  R/o aspiration  Determine safest and least restrictive diet  Failed nursing dysphagia assessment  Change in mental status  New neuro event  Stroke protocol  H/o neurological disease  Current diet:  npo  Premorbid diet[de-identified]  Pt stated he ate regular food  Previous VBS:  none known  O2 requirement:  none  Voice/Speech:  wnl  Social:  Lives alone  Follows commands:  yes                        Cognitive Status:  Alert and able to make needs known  Oral [facility] exam:  Dentition:upper plate, lower edentulous  Labial strength and ROM:reduced on L  Lingual strength and ROM:deviates to L  Mandibular strength and ROM:unabel to fully assess  Secretion management:wnl    Items administered:  gingerale    Oral stage:+ w/ liquids  Lip closure:+  Mastication:na  Bolus formation:+  Bolus control:+  Transfer:+  Oral residue:-  Pocketing:-    Pharyngeal stage:+ w/ liquids  Swallow promptness:+  Laryngeal rise:+  Wet voice:-  Throat clear:-  Cough:-  Secondary swallows:-  Audible swallows:-  No overt s/s aspiration    Esophageal stage:  H/o GERD    Aspiration precautions posted    Results d/w:  Pt, nursing, physician      CT head neg acute  MRI pending  CTA IMPRESSION:  Severe focal stenosis of the proximal left M1 segment  Severe stenosis of the distal right V4 segment  Moderate calcified atherosclerotic disease at the bilateral ICA terminus, worse on the right which extends to and involves the supraclinoid portion  Luminal irregularity with areas of at least moderate stenosis throughout the bilateral P1 and P2 segments  No hemodynamically significant stenosis, dissection or occlusion of the carotid or vertebral arteries  Chief Complaint:   Left sided weakness  History of Present Illness:  Sandra Fish is a 66 y o  male who presents with left sided weakness  Patient with hx stroke in 2016  Symptoms at the time were facial droop and dysarthria  Family states he still had a mild left facial droop which was residual  Tonight he went down into the basement to do work on a drain pipe  He was looking up when he fell backwards  He did not hit his head  He had left arm and leg weakness  He could not get up and laid on the ground for 6 hours until he was found by his family  He lives alone  He denies any chest pain or shortness of breath  He has been on plavix   He does not always take his statin  No fever

## 2020-03-20 NOTE — H&P
H&P- Toi Ferrer 1945, 66 y o  male MRN: [id number]    Unit/Bed#: ED 19 Encounter: [id number]    Primary Care Provider: No primary care provider on file  Date and time admitted to hospital: 3/20/2020 12:15 AM        * Stroke [facility])  Assessment & Plan  Admit to med/surg on stroke pathway  Patient out of window for tPA  CTA shows severe focal stenosis of proximal left M! Segment and severe stenosis of the distal right V4 segment  Continue ASA/plavix  Consult neuro    HTN (hypertension)  Assessment & Plan  Hold antihypertensives for now  Allow for permissive hypertensive in setting of stroke        VTE Prophylaxis: Enoxaparin (Lovenox)  / sequential compression device   Code Status: full code  POLST: There is no POLST form on file for this patient (pre-hospital)  Discussion with family: family at bedside    Anticipated Length of Stay:  Patient will be admitted on an Inpatient basis with an anticipated length of stay of  Greater than 2 midnights  Justification for Hospital Stay: patient on stroke pathway    Total Time for Visit, including Counseling / Coordination of Care: 45 minutes  Greater than 50% of this total time spent on direct patient counseling and coordination of care  Chief Complaint:   Left sided weakness    History of Present Illness:    Toi Ferrer is a 66 y o  male who presents with left sided weakness  Patient with hx stroke in 2016  Symptoms at the time were facial droop and dysarthria  Family states he still had a mild left facial droop which was residual  Tonight he went down into the basement to do work on a drain pipe  He was looking up when he fell backwards  He did not hit his head  He had left arm and leg weakness  He could not get up and laid on the ground for 6 hours until he was found by his family  He lives alone  He denies any chest pain or shortness of breath  He has been on plavix  He does not always take his statin  No fever       Review of Systems:    Review of Systems Constitutional: Positive for activity change  HENT: Negative  Eyes: Negative  Respiratory: Negative  Cardiovascular: Negative  Gastrointestinal: Negative  Endocrine: Negative  Genitourinary: Negative  Musculoskeletal: Positive for gait problem  Skin: Negative  Allergic/Immunologic: Negative  Neurological: Positive for facial asymmetry and weakness  Hematological: Negative  Psychiatric/Behavioral: Negative  Past Medical and Surgical History:     No past medical history on file  No past surgical history on file  Meds/Allergies:    Prior to Admission medications    Medication Sig Start Date End Date Taking? Authorizing Provider   aspirin 81 mg chewable tablet Chew 81 mg daily 4/27/17  Yes Historical Provider, MD   atorvastatin (LIPITOR) 40 mg tablet take 1 tablet by mouth NIGHTLY 1/29/19  Yes Historical Provider, MD   clopidogrel (PLAVIX) 75 mg tablet Take 75 mg by mouth daily 7/25/19  Yes Historical Provider, MD   lisinopril (ZESTRIL) 40 mg tablet Take 40 mg by mouth daily 7/25/19  Yes Historical Provider, MD   metoprolol succinate (TOPROL-XL) 100 mg 24 hr tablet Take 100 mg by mouth daily 7/25/19  Yes Historical Provider, MD     I have reviewed home medications with patient personally      Allergies: No Known Allergies    Social History:     Marital Status: Single   Occupation: retired  Patient Pre-hospital Living Situation: lives alone  Patient Pre-hospital Level of Mobility: ambulatory  Patient Pre-hospital Diet Restrictions: none  Substance Use History:   Social History     Substance and Sexual Activity   Alcohol Use Not on file     Social History     Tobacco Use   Smoking Status Not on file     Social History     Substance and Sexual Activity   Drug Use Not on file       Family History:    non-contributory    Physical Exam:     Vitals:   Blood Pressure: (!) 185/79 (03/20/20 0100)  Pulse: 86 (03/20/20 0100)  Temperature: 97 6 °F (36 4 °C) (03/20/20 0019)  Temp Source: Oral (03/20/20 0019)  Respirations: 18 (03/20/20 0100)  Weight - Scale: 70 6 kg (155 lb 10 3 oz) (03/20/20 0023)  SpO2: 95 % (03/20/20 0100)    Physical Exam   Constitutional: He is oriented to person, place, and time  He appears well-developed and well-nourished  No distress  HENT:   Head: Normocephalic and atraumatic  Eyes: Pupils are equal, round, and reactive to light  EOM are normal    Neck: Normal range of motion  Neck supple  Cardiovascular: Normal rate and regular rhythm  Pulmonary/Chest: Effort normal and breath sounds normal    Abdominal: Soft  Bowel sounds are normal    Musculoskeletal:   Left sided facial droop  Speech normal  Left arm drift  Left leg weakness to gravity   Neurological: He is alert and oriented to person, place, and time  Skin: Skin is warm and dry  He is not diaphoretic  Psychiatric: He has a normal mood and affect  His behavior is normal    Vitals reviewed  Additional Data:     Lab Results: I have personally reviewed pertinent reports  Results from last 7 days   Lab Units 03/20/20  0026   WBC Thousand/uL 10 26*   HEMOGLOBIN g/dL 13 6   HEMATOCRIT % 42 5   PLATELETS Thousands/uL 260     Results from last 7 days   Lab Units 03/20/20  0026   SODIUM mmol/L 139   POTASSIUM mmol/L 4 1   CHLORIDE mmol/L 101   CO2 mmol/L 28   BUN mg/dL 15   CREATININE mg/dL 0 86   ANION GAP mmol/L 10   CALCIUM mg/dL 9 2   GLUCOSE RANDOM mg/dL 134     Results from last 7 days   Lab Units 03/20/20  0026   INR  1 02     Results from last 7 days   Lab Units 03/20/20  0057   POC GLUCOSE mg/dl 137               Imaging: I have personally reviewed pertinent reports  X-ray chest 1 view portable   Final Result by Jose Mulligan MD (03/20 0104)      No focal consolidation, pleural effusion, or pneumothorax              Workstation performed: [id number]         CTA stroke alert (head/neck)   Final Result by Jose Mulligan MD (03/20 0136)      Severe focal stenosis of the proximal left M1 segment  Severe stenosis of the distal right V4 segment       Moderate calcified atherosclerotic disease at the bilateral ICA terminus, worse on the right which extends to and involves the supraclinoid portion      Luminal irregularity with areas of at least moderate stenosis throughout the bilateral P1 and P2 segments  No hemodynamically significant stenosis, dissection or occlusion of the carotid or vertebral arteries  Findings were directly discussed with Madhuri Collazo on 3/20/2020 1:04 AM                      Workstation performed: [id number]         CT stroke alert brain   Final Result by Elba Hernandez DO (03/20 0047)      No acute intracranial abnormality  Microangiopathic changes  Findings were directly discussed with [facility] on 3/20/2020 12:43 AM       Workstation performed: [id number]             EKG, Pathology, and Other Studies Reviewed on Admission:   · EKG: right bundle branch block    Allscripts / Epic Records Reviewed: Yes     ** Please Note: This note has been constructed using a voice recognition system   **

## 2020-03-20 NOTE — PHYSICAL THERAPY NOTE
PHYSICAL THERAPY EVALUATION          Patient Name: Manisha Medellin  ATGBG'E Date: 3/20/2020   PT EVALUATION    66 y o     [id number]    Nausea & vomiting [R11 2]  CVA (cerebral vascular accident) ([facility] [address] ) [I63 9]  Elevated troponin [R79 89]  RBBB [I45 10]    Past Medical History:   Diagnosis Date    Hypertension     Stroke [facility])     2017     History reviewed  No pertinent surgical history  03/20/20 1025   Note Type   Note type Eval only   Pain Assessment   Pain Assessment Tool Pain Assessment not indicated - pt denies pain   Pain Score No Pain   Home Living   Type of Home House   Home Layout Two level;Bed/bath upstairs;Stairs to enter without rails   Bathroom Shower/Tub Tub/shower unit   [employer] in [address]   Additional Comments pt resides in 2 5 SH, 7 SADE no rails  FOS to second story bed/bath   Prior Function   Level of Devol Independent with ADLs and functional mobility   Lives With Alone   Receives Help From Family  (supportive sister)   ADL Assistance Independent   IADLs Independent   Falls in the last 6 months 1 to 4  (1 related to admission)   Vocational Retired   Comments pta pt reports being indep, amb w/o an AD, +  has local sister who is supportive    Restrictions/Precautions   Other Precautions Chair Alarm; Bed Alarm; Fall Risk;Multiple lines   General   Additional Pertinent History pt admitted 3/20/20 for stroke, +L sided weakness, L lean present on IE  pt complaining of nausea, appears flushed, temp 98 4   Family/Caregiver Present No   Cognition   Overall Cognitive Status WFL   Arousal/Participation Cooperative   Orientation Level Oriented X4   Memory Within functional limits   Following Commands Follows one step commands with increased time or repetition   Comments delay response time   RUE Assessment   RUE Assessment   (refer to OT)   LUE Assessment LUE Assessment   (refer to OT)   RLE Assessment   RLE Assessment WFL  (grossly 4+/5)   LLE Assessment   LLE Assessment X   Strength LLE   L Hip Flexion 3/5  (difficulty maintaining position for MMT)   L Hip ABduction 4-/5   L Hip ADduction 4-/5   L Knee Flexion 4-/5   L Knee Extension 4+/5   L Ankle Dorsiflexion 4+/5   Coordination   Movements are Fluid and Coordinated 0   Coordination and Movement Description dysmetria on L w finger to nose testing   Sensation WFL  (of BLE)   Light Touch   RLE Light Touch Grossly intact   LLE Light Touch Grossly intact   Bed Mobility   Supine to Sit 3  Moderate assistance   Additional items Assist x 2;HOB elevated; Bedrails; Increased time required;Verbal cues;LE management; Other  (trunk support)   Sit to Supine 3  Moderate assistance   Additional items Assist x 2;HOB elevated; Increased time required;Verbal cues;LE management; Other  (trunk support)   Additional Comments L sided weakness and L lean requiring increased physical support for trunk control  Transfers   Sit to Stand 3  Moderate assistance   Additional items Assist x 2; Increased time required; Bedrails; Other;Verbal cues  (RW)   Stand to Sit 3  Moderate assistance   Additional items Assist x 2;Bedrails; Increased time required;Verbal cues; Other  (RW)   Additional Comments cues for technique including posture (promote excessive R lean to offset L lean), positioning of hands (require A to position and maintain L hand on RW)   Ambulation/Elevation   Gait pattern L Hemiparesis; Poor UE support; Improper Weight shift;Decreased foot clearance; Inconsistent wesley; Excessively slow; Short stride; Ataxia  (L lean)   Gait Assistance 3  Moderate assist   Additional items Assist x 2;Verbal cues; Tactile cues  (vc for technique and posture, A to manipulate RW)   Assistive Device Rolling walker   Distance 2' side stepping at [facility]   Stair Management Assistance Not tested  (due to poor standing balance, safety concerns)   Balance   Static Sitting Poor +  (L lean, difficult to self correct despite verbal/tactile cue)   Dynamic Sitting Poor   Static Standing Zero  (Ax2)   Ambulatory Zero  (Ax2)   Endurance Deficit   Endurance Deficit Yes   Endurance Deficit Description fatigue, weakness, nausea; BP pre amb sitting /105, post amb in supine 147/77   Activity Tolerance   Activity Tolerance Patient limited by fatigue; Other (Comment)  (nausea)   Medical Staff Made Aware Lary OT   Nurse Made Aware Sean Field RN; cleared for therapy   Assessment   Prognosis Guarded   Problem List Decreased strength;Decreased range of motion;Decreased endurance; Impaired balance;Decreased mobility; Decreased coordination; Impaired sensation   Assessment Can Kate is a 66 y o  male admitted to [facility] on 3/20/2020 for Stroke [facility])  Pt  has a past medical history of Hypertension and Stroke ([facility] [address] )    PT was consulted and pt was seen on 3/20/2020 for mobility assessment and d/c planning  Pt presents high fall risk, monitoring abn vitals, PIV  Pt lives alone in a 2 5 story home with 7 SADE and FOS to second story bed and bath  Prior to admission pt required independent for transfers, elevations and ambulation using a no assistive device and indep for IADLs and ADLs  Pt is currently functioning at a moderate assistance x2 level for bed mobility, moderate assistance x2 level for transfers, moderate assistance x2 level for ambulation with Rolling Walker  Pt demonstrated significant L sided lean during session contributing to poor balance and increased physical A to safely perform mobility tasks  Provided pt w verbal and tactile cuing to correct static sitting balance- pt verbalize understanding however difficulty sustaining midline posture  Frequent cuing needed to correct L lean  Performed side stepping at EOB due to safety concerns and poor balance; ataxic gait noted w poor sequencing w RW and physical A to manipulate AD   Pt will benefit from continued skilled IP PT to address the above mentioned impairments  in order to maximize recovery and increase functional independence when completing mobility and ADLs  Currently PT recommendations for DME include RW  At this time PT recommendations for d/c are STR  End of session pt positioned in bed to prevent L lean, bed alarm engaged, call bell and all needs in reach of RUE  Barriers to Discharge Inaccessible home environment;Decreased caregiver support   Barriers to Discharge Comments +steps, lives alone   Goals   Patient Goals to feel better   STG Expiration Date 04/03/20   Short Term Goal #1 1)  Pt will perform bed mobility with [place] demonstrating appropriate technique 100% of the time in order to improve function  2)  Perform all transfers with [place] demonstrating safe and appropriate technique 100% of the time in order to improve ability to negotiate safely in home environment  3) Amb with least restrictive AD > 200'x1 with mod I in order to demonstrate ability to negotiate in home environment  4)  Improve overall strength and balance 1/2 grade in order to optimize ability to perform functional tasks and reduce fall risk  5) Increase activity tolerance to 45 minutes in order to improve endurance to functional tasks  6)  Negotiate stairs using most appropriate technique and mod I in order to be able to negotiate safely in home environment  7) PT for ongoing patient and family/caregiver education, DME needs and d/c planning in order to promote highest level of function in least restrictive environment  PT Treatment Day 0   Plan   Treatment/Interventions Functional transfer training;LE strengthening/ROM; Therapeutic exercise;Elevations; Endurance training;Patient/family training;Equipment eval/education; Bed mobility;Gait training; Compensatory technique education;Continued evaluation;OT   PT Frequency Other (Comment)  (5-6/wk)   Recommendation   Recommendation Short-term skilled PT   Equipment Recommended Walker   PT - OK to Discharge Yes   Additional Comments when medically stable   Modified Duval Scale   Modified Duval Scale 4   Barthel Index   Feeding 5   Bathing 0   Grooming Score 0   Dressing Score 0   Bladder Score 5   Bowels Score 10   Toilet Use Score 5   Transfers (Bed/Chair) Score 5   Mobility (Level Surface) Score 0   Stairs Score 0   Barthel Index Score 30   History: co - morbidities, age, social background(lives alone, +steps), fall risk, use of assistive device, assist for adl's, cognition, multiple lines  Exam: impairments in systems including musculoskeletal (ROM, strength, posture), neuromuscular (balance, coordination, gait, transfers, motor function), cardiopulmonary (BP), cognition  Clinical: unstable/unpredictable  Complexity:high    Megan Hsieh, PT

## 2020-03-21 PROBLEM — E87.6 HYPOKALEMIA: Status: ACTIVE | Noted: 2020-03-21

## 2020-03-21 LAB
ANION GAP SERPL CALCULATED.3IONS-SCNC: 12 MMOL/L (ref 4–13)
BASOPHILS # BLD AUTO: 0.03 THOUSANDS/ΜL (ref 0–0.1)
BASOPHILS NFR BLD AUTO: 0 % (ref 0–1)
BUN SERPL-MCNC: 10 MG/DL (ref 5–25)
CALCIUM SERPL-MCNC: 8.3 MG/DL (ref 8.3–10.1)
CHLORIDE SERPL-SCNC: 106 MMOL/L (ref 100–108)
CO2 SERPL-SCNC: 24 MMOL/L (ref 21–32)
CREAT SERPL-MCNC: 0.81 MG/DL (ref 0.6–1.3)
EOSINOPHIL # BLD AUTO: 0.07 THOUSAND/ΜL (ref 0–0.61)
EOSINOPHIL NFR BLD AUTO: 1 % (ref 0–6)
ERYTHROCYTE [DISTWIDTH] IN BLOOD BY AUTOMATED COUNT: 13.2 % (ref 11.6–15.1)
GFR SERPL CREATININE-BSD FRML MDRD: 86 ML/MIN/1.73SQ M
GLUCOSE SERPL-MCNC: 83 MG/DL (ref 65–140)
HCT VFR BLD AUTO: 36.9 % (ref 36.5–49.3)
HGB BLD-MCNC: 12 G/DL (ref 12–17)
IMM GRANULOCYTES # BLD AUTO: 0.02 THOUSAND/UL (ref 0–0.2)
IMM GRANULOCYTES NFR BLD AUTO: 0 % (ref 0–2)
LYMPHOCYTES # BLD AUTO: 1.5 THOUSANDS/ΜL (ref 0.6–4.47)
LYMPHOCYTES NFR BLD AUTO: 18 % (ref 14–44)
MAGNESIUM SERPL-MCNC: 2.1 MG/DL (ref 1.6–2.6)
MCH RBC QN AUTO: 29.5 PG (ref 26.8–34.3)
MCHC RBC AUTO-ENTMCNC: 32.5 G/DL (ref 31.4–37.4)
MCV RBC AUTO: 91 FL (ref 82–98)
MONOCYTES # BLD AUTO: 0.91 THOUSAND/ΜL (ref 0.17–1.22)
MONOCYTES NFR BLD AUTO: 11 % (ref 4–12)
NEUTROPHILS # BLD AUTO: 5.74 THOUSANDS/ΜL (ref 1.85–7.62)
NEUTS SEG NFR BLD AUTO: 70 % (ref 43–75)
NRBC BLD AUTO-RTO: 0 /100 WBCS
PLATELET # BLD AUTO: 210 THOUSANDS/UL (ref 149–390)
PMV BLD AUTO: 10.4 FL (ref 8.9–12.7)
POTASSIUM SERPL-SCNC: 3 MMOL/L (ref 3.5–5.3)
RBC # BLD AUTO: 4.07 MILLION/UL (ref 3.88–5.62)
SODIUM SERPL-SCNC: 142 MMOL/L (ref 136–145)
WBC # BLD AUTO: 8.27 THOUSAND/UL (ref 4.31–10.16)

## 2020-03-21 PROCEDURE — 97110 THERAPEUTIC EXERCISES: CPT

## 2020-03-21 PROCEDURE — 97530 THERAPEUTIC ACTIVITIES: CPT

## 2020-03-21 PROCEDURE — 97116 GAIT TRAINING THERAPY: CPT

## 2020-03-21 PROCEDURE — 80048 BASIC METABOLIC PNL TOTAL CA: CPT | Performed by: FAMILY MEDICINE

## 2020-03-21 PROCEDURE — 83735 ASSAY OF MAGNESIUM: CPT | Performed by: FAMILY MEDICINE

## 2020-03-21 PROCEDURE — 85025 COMPLETE CBC W/AUTO DIFF WBC: CPT | Performed by: FAMILY MEDICINE

## 2020-03-21 PROCEDURE — 97535 SELF CARE MNGMENT TRAINING: CPT

## 2020-03-21 PROCEDURE — 99233 SBSQ HOSP IP/OBS HIGH 50: CPT | Performed by: FAMILY MEDICINE

## 2020-03-21 RX ORDER — HYDRALAZINE HYDROCHLORIDE 20 MG/ML
10 INJECTION INTRAMUSCULAR; INTRAVENOUS EVERY 4 HOURS PRN
Status: DISCONTINUED | OUTPATIENT
Start: 2020-03-21 | End: 2020-03-25 | Stop reason: HOSPADM

## 2020-03-21 RX ORDER — POTASSIUM CHLORIDE 20 MEQ/1
40 TABLET, EXTENDED RELEASE ORAL 2 TIMES DAILY
Status: COMPLETED | OUTPATIENT
Start: 2020-03-21 | End: 2020-03-21

## 2020-03-21 RX ADMIN — ENOXAPARIN SODIUM 40 MG: 40 INJECTION SUBCUTANEOUS at 08:13

## 2020-03-21 RX ADMIN — CLOPIDOGREL BISULFATE 75 MG: 75 TABLET ORAL at 08:13

## 2020-03-21 RX ADMIN — POTASSIUM CHLORIDE 40 MEQ: 1500 TABLET, EXTENDED RELEASE ORAL at 12:02

## 2020-03-21 RX ADMIN — HYDRALAZINE HYDROCHLORIDE 10 MG: 20 INJECTION INTRAMUSCULAR; INTRAVENOUS at 23:35

## 2020-03-21 RX ADMIN — ATORVASTATIN CALCIUM 40 MG: 40 TABLET, FILM COATED ORAL at 17:16

## 2020-03-21 RX ADMIN — POTASSIUM CHLORIDE 40 MEQ: 1500 TABLET, EXTENDED RELEASE ORAL at 17:16

## 2020-03-21 RX ADMIN — ASPIRIN 81 MG: 81 TABLET, COATED ORAL at 08:12

## 2020-03-21 RX ADMIN — SODIUM CHLORIDE 100 ML/HR: 0.9 INJECTION, SOLUTION INTRAVENOUS at 00:28

## 2020-03-21 NOTE — ASSESSMENT & PLAN NOTE
Patient presenting with Left-sided upper and lower extremity weakness which he suddenly experienced when repairing pipe in the basement followed by a fall backwards, denies head trauma  Extensive intracranial disease demonstrated on CTA head - showing severe focal stenosis of proximal left M Segment and severe stenosis of the distal right V4 segment    MRI brain revealed "Acute right thalamic lacunar infarction" not demonstrated on CT head   Patient presented out of window for tPA  Continue ASA/plavix as well as high-intensity statin  Echo unremarkable, preserved EF, grade 1 diastolic dysfunction   Neurology recommendations appreciated  Patient to be discharged to [address] [place] [address]

## 2020-03-21 NOTE — ASSESSMENT & PLAN NOTE
Patient initially presented with accelerated hypertension - antihypertensives were held for permissive hypertension  Last BP check in acceptable range  Will await recommendations from Neurology if okay to resume home BP medications

## 2020-03-21 NOTE — UTILIZATION REVIEW
Initial Clinical Review    Admission: Date/Time/Statement: Admission Orders (From admission, onward)     Ordered        03/20/20 0133  Inpatient Admission (expected length of stay for this patient Order details is greater than two midnights)  Once                   Orders Placed This Encounter   Procedures    Inpatient Admission (expected length of stay for this patient Order details is greater than two midnights)     Standing Status:   Standing     Number of Occurrences:   1     Order Specific Question:   Admitting Physician     Answer:   Deuce Barrow     Order Specific Question:   Level of Care     Answer:   Med Surg [16]     Order Specific Question:   Estimated length of stay     Answer:   More than 2 Midnights     Order Specific Question:   Certification     Answer:   I certify that inpatient services are medically necessary for this patient for a duration of greater than two midnights  See H&P and MD Progress Notes for additional information about the patient's course of treatment  ED Arrival Information     Expected Arrival Acuity Means of Arrival Escorted By Service Admission Type    - 3/20/2020 00:15 Emergent Ambulance [facility] ([address]) Hospitalist Emergency    Arrival Complaint    CTA        Chief Complaint   Patient presents with    CVA/TIA-like Symptoms     Pt was found on floor by sister, last well known 6:30, left sided deficits, on thinners for hx of stroke     Assessment/Plan: 66 y o male presented to ED from home via EMS as inpatient admission for stroke  Patient DM type 2 essential HTN HPD thyroid nodules lesion of the parotid gland and stroke on plavix  Patient was working in basement fixing a drain pipe fell backward due to  of sudden left arm leg numbness and tingling followed by weakness  Denies hitting his head  Patient was in the basement and found by his sister ~ 6 hrs later  (+) slurred speech facial drooping noted   Ataxia  (+) cranial nerve deficit noted, facial asymmetry  Plan consult neurology, ASA plavix    As per neuro       -CTA head and neck demonstrates severe focal stenosis at the proximal left M1 segment  Severe stenosis of the distal right V4 segment  Moderate calcified atherosclerotic disease in bilateral ICA terminus, luminal irregularity with stenosis at the bilateral P1 and P2 segments  No overt hemodynamically significant stenosis, occlusion or dissection of the carotid or vertebral arteries                -CT head demonstrated no acute intracranial abnormality              -echo pending              -tele              -HbA1c 6 1, lipid profile with cholesterol 242,               -continue ASA and Plavix              -continue statin              -PT/OT/speech              -will continue follow closely, please monitor exam and notify with changes  ED Triage Vitals   Temperature Pulse Respirations Blood Pressure SpO2   03/20/20 0019 03/20/20 0019 03/20/20 0019 03/20/20 0019 03/20/20 0019   97 6 °F (36 4 °C) 67 20 127/96 96 %      Temp Source Heart Rate Source Patient Position - Orthostatic VS BP Location FiO2 (%)   03/20/20 0019 03/20/20 0019 03/20/20 0019 03/20/20 0019 --   Oral Monitor Lying Right arm       Pain Score       03/20/20 0900       No Pain        Wt Readings from Last 1 Encounters:   03/20/20 70 4 kg (155 lb 3 3 oz)     Additional Vital Signs:     Date/Time  Temp  Pulse  Resp  BP  MAP (mmHg)  SpO2  O2 Device  Patient Position - Orthostatic VS   03/21/20 1125  97 7 °F (36 5 °C)  66  18  140/73  96  93 %  None (Room air)  Lying   03/21/20 0841        216/90Abnormal         Sitting   03/21/20 0830  98 3 °F (36 8 °C)  65  18  197/134Abnormal   171  95 %  None (Room air)  Sitting   03/20/20 2251  98 6 °F (37 °C)  66  18  156/101Abnormal     96 %  None (Room air)  Lying   03/20/20 1900  98 4 °F (36 9 °C)  56  18  183/74Abnormal     94 %    Lying   03/20/20 1500  97 9 °F (36 6 °C)  55  18  171/63Abnormal      87 %Abnormal    Lying   BP: Nurse Aware at 03/20/20 1500   03/20/20 1110  98 6 °F (37 °C)  76  18  146/66  95  97 %  None (Room air)  Lying   03/20/20 0728  98 6 °F (37 °C)  81  18  129/59    96 %  None (Room air)  Lying   03/20/20 0500  98 8 °F (37 1 °C)  73  18  109/85    94 %  None (Room air)  Lying   03/20/20 0400  98 7 °F (37 1 °C)  76  18  116/64    92 %  None (Room air)  Lying   03/20/20 0300  98 7 °F (37 1 °C)  85  19  196/86Abnormal     95 %  None (Room air)  Lying   03/20/20 0200  97 8 °F (36 6 °C)  84  18  185/108Abnormal      97 %  None (Room air)  Lying   BP: RN notified at 03/20/20 0200   03/20/20 0149    86  18  149/79    95 %  None (Room air)  Lying   03/20/20 0130    76  15  132/59  91  95 %  None (Room air)  Lying   03/20/20 0100    86  18  185/79Abnormal     95 %  None (Room air)  Sitting   03/20/20 0045    86  18  144/98  116  97 %  None (Room air)  Lying   03/20/20 0030    84  20      95 %  None (Room air)  Lying   03/20/20 0028    65  18  136/95    96 %  None (Room air)  Lying       Pertinent Labs/Diagnostic Test Results:   Results from last 7 days   Lab Units 03/21/20  0443 03/20/20  0635 03/20/20  0026   WBC Thousand/uL 8 27 9 72 10 26*   HEMOGLOBIN g/dL 12 0 12 5 13 6   HEMATOCRIT % 36 9 38 8 42 5   PLATELETS Thousands/uL 210 226 260   NEUTROS ABS Thousands/µL 5 74  --   --          Results from last 7 days   Lab Units 03/21/20  0443 03/20/20  0635 03/20/20  0026   SODIUM mmol/L 142 138 139   POTASSIUM mmol/L 3 0* 3 5 4 1   CHLORIDE mmol/L 106 103 101   CO2 mmol/L 24 24 28   ANION GAP mmol/L 12 11 10   BUN mg/dL 10 14 15   CREATININE mg/dL 0 81 0 82 0 86   EGFR ml/min/1 73sq m 86 86 84   CALCIUM mg/dL 8 3 8 6 9 2   MAGNESIUM mg/dL 2 1  --   --          Results from last 7 days   Lab Units 03/20/20  0057   POC GLUCOSE mg/dl 137     Results from last 7 days   Lab Units 03/21/20  0443 03/20/20  0635 03/20/20  0026   GLUCOSE RANDOM mg/dL 83 116 134         Results from last 7 days   Lab Units 03/20/20  0635   HEMOGLOBIN A1C % 6 1*   EAG mg/dl 128     Results from last 7 days   Lab Units 03/20/20  0026   CK TOTAL U/L 207   CK MB INDEX % 1 3   CK MB ng/mL 2 7     Results from last 7 days   Lab Units 03/20/20  0635 03/20/20  0337 03/20/20  0026   TROPONIN I ng/mL 0 33* 0 26* 0 24*         Results from last 7 days   Lab Units 03/20/20  0026   PROTIME seconds 13 5   INR  1 02   PTT seconds 28       EKG 03-20-20  Sinus rhythm with sinus arrhythmia  Possible Left atrial enlargement  Right bundle branch block  Left anterior fascicular block    MRI brain 03-20-20  Mild to moderate chronic microvascular ischemic disease  Acute right thalamic lacunar infarction, not evident by CT  Age-related atrophy    CXR 03-20-20  No focal consolidation, pleural effusion, or pneumothorax  CTA stroke head and neck 03-20-20  Severe focal stenosis of the proximal left M1 segment  Severe stenosis of the distal right V4 segment  Moderate calcified atherosclerotic disease at the bilateral ICA terminus, worse on the right which extends to and involves the supraclinoid portion  Luminal irregularity with areas of at least moderate stenosis throughout the bilateral P1 and P2 segments  No hemodynamically significant stenosis, dissection or occlusion of the carotid or vertebral arteries  CT stroke alter brain  03-20-20  No acute intracranial abnormality   Microangiopathic changes      ED Treatment:   Medication Administration from 03/20/2020 0015 to 03/20/2020 0205       Date/Time Order Dose Route Action     03/20/2020 0036 ondansetron (ZOFRAN) injection 4 mg 4 mg Intravenous Given     03/20/2020 0057 sodium chloride 0 9 % bolus 1,000 mL 1,000 mL Intravenous New Bag     03/20/2020 0144 trimethobenzamide (TIGAN) IM injection 200 mg 200 mg Intramuscular Given        Past Medical History:   Diagnosis Date    Hypertension     Stroke ([facility] [address] )     2017     Present on Admission:  **None**      Admitting Diagnosis: Nausea & vomiting [R11 2]  CVA (cerebral vascular accident) ([facility] [address] ) [I63 9]  Elevated troponin [R79 89]  RBBB [I45 10]  Age/Sex: 66 y o  male  Admission Orders:  Scheduled Medications:    Medications:  aspirin 81 mg Oral Daily   atorvastatin 40 mg Oral Daily With Dinner   clopidogrel 75 mg Oral Daily   enoxaparin 40 mg Subcutaneous Daily   potassium chloride 40 mEq Oral BID     Continuous IV Infusions:     PRN Meds:    acetaminophen 650 mg Oral Q4H PRN   calcium carbonate 1,000 mg Oral Daily PRN   iohexol 85 mL Intravenous Once in imaging   ondansetron 4 mg Intravenous Q6H PRN       IP CONSULT TO NEUROLOGY  IP CONSULT TO CASE MANAGEMENT   PT/OT  scd  Neuro checks and VS  q 1 hr x 4  q 2 hrs x 4 than q 4 hrs x 72 hrs  PT/OT/Speech  Telemetry          Network Utilization Review Department  [email or web address]  ATTENTION: Please call with any questions or concerns to [phone] and carefully listen to the prompts so that you are directed to the right person  All voicemails are confidential   Dieudonne Morris all requests for admission clinical reviews, approved or denied determinations and any other requests to dedicated fax number below belonging to the campus where the patient is receiving treatment   List of dedicated fax numbers for the Facilities:  [address] DENIALS (Administrative/Medical Necessity) [phone]   [address] [place] [address] (Maternity/NICU/Pediatrics) [phone]   Nhung Vasquez [phone]   [facility] [phone]   Israel Zamora [phone]   Lexie Baumann [phone]   [address] [phone]   [facility]  [phone]   [address] [facility]  [address] [facility] [address] [facility] [phone]

## 2020-03-21 NOTE — PHYSICAL THERAPY NOTE
Physical Therapy Treatment Note       03/21/20 1245   Pain Assessment   Pain Assessment Tool Pain Assessment not indicated - pt denies pain   Restrictions/Precautions   Other Precautions Impulsive; Chair Alarm; Bed Alarm; Fall Risk   General   Chart Reviewed Yes   Family/Caregiver Present No   Cognition   Overall Cognitive Status Impaired   Arousal/Participation Alert   Attention Attends with cues to redirect   Orientation Level Oriented X4   Memory Within functional limits   Following Commands Follows one step commands without difficulty   Subjective   Subjective " i can't believe what happened to me "    Bed Mobility   Rolling L 4  Minimal assistance   Additional items Assist x 1; Increased time required;Verbal cues;LE management;HOB elevated   Supine to Sit 4  Minimal assistance   Additional items Assist x 1;HOB elevated; Bedrails; Increased time required;Verbal cues;LE management; Impulsive   Transfers   Sit to Stand 3  Moderate assistance   Additional items Assist x 2;Armrests; Increased time required;Verbal cues; Impulsive   Stand to Sit 3  Moderate assistance   Additional items Assist x 2;Armrests; Increased time required;Verbal cues; Impulsive   Stand pivot 3  Moderate assistance   Additional items Assist x 2;Armrests; Increased time required;Verbal cues; Impulsive   Toilet transfer 3  Moderate assistance   Additional items Assist x 2;Armrests; Increased time required; Impulsive;Verbal cues; Commode   Additional Comments cues for handplacement and positioning on walker  A to position l hand on walker and to maintain    Ambulation/Elevation   Gait pattern L Hemiparesis; Poor UE support; Improper Weight shift; Forward Flexion;Decreased foot clearance;Narrow BOBBY; Inconsistent wesley; Foward flexed; Short stride; Step to;Excessively slow; Ataxia  (Left side lean)   Gait Assistance 3  Moderate assist  (mod- max assist x2 )   Additional items Assist x 2;Verbal cues; Tactile cues   Assistive Device Rolling walker   Distance 15' x1, 8' x1 seated rest break between gait trials  Balance   Static Sitting Fair  (once midline achieved with and w/out ue support)   Dynamic Sitting Fair  (without ue support)   Static Standing Poor   Dynamic Standing Poor   Ambulatory Poor -  (A x2)   Endurance Deficit   Endurance Deficit Yes   Endurance Deficit Description fatigue, weakness,    Activity Tolerance   Activity Tolerance Patient limited by fatigue   Medical Staff Made Aware Keaton Lozada RN   Exercises   Heelslides Supine;10 reps;AROM; Left   Hip Flexion Supine;10 reps;AROM; Left   Hip Abduction Supine;10 reps;AROM; Left   Hip Adduction Supine;10 reps;AROM; Left  (to midline)   Knee AROM Long Arc Quad Sitting;10 reps;AROM; Left   Ankle Pumps Sitting;10 reps;AROM; Bilateral   Balance training  static sitting and dynamic sitting balacne perfomed at edge of bed,  raching with l and r ue's forward, corss body and to ane floor with close supervision to min assist x1, pt performed l lateral side bends pushing up to steated position with l ue x 5 reps  Noted reptropulsion while performing seated left le arom exercises requiring min assist to correct and maintain  x 10-12 minutes  Assessment   Prognosis Good   Problem List Decreased strength;Decreased endurance; Impaired balance;Decreased mobility; Decreased coordination;Decreased safety awareness; Impaired judgement; Impaired sensation   Assessment Pt  Seen for Pt pr POC  Less assistance for bed mobility min assist x1 required however pt continues to require mod assist x2 for transfers, stand pivot transfers, tranfers on and off bedside commode and mod- max assist x2 for gait training  increased assistance required with fatigue  Pt  Demonstrated l lean upon sitting at edge of bed requiring verbal and min assist to correct  Progressing to pt being able to self correct with visual cues ad then pt able to maintain with static sitting    Pt requires min assist to maintnain dynamic sitting balance at edge of bed while performing seated l le arom exercises noted retropulsion and inability to self correct requiring assistance  Pt requires verbal cues for proper handplacement and assistance for proper placement and positioning of L hand on Rw and assistance to maintain  Noted L hand falling off Rw x2 when no support/ assistance provided  Pt  Progressed with ambulation distances to 15' x1 and 8' x1 with mod- max assist x2 with constant verbal cues for le sequencing, proper step lengths, improved posture, positioning of l hand  Left side lean noted worsening with fatigue  Impaired weightshfiting requiring assistance  Pt requires one seated rest break  Cues and assistance for steering, turning and walker management  Cues for proper turning techniques requires  Pt performs supine an seated b/l le arom exercises cues to perform slowly for improved control and coordination  Pt is impulsive, with decreased awareness of deficits  Pt remained seated out of bed in recliner with chair alarm activated call bell in reach  Recommend inpt rehab at d/c  Goals   Patient Goals  to get better and get backt o doing what i was ding before "     STG Expiration Date 04/03/20   PT Treatment Day 1   Plan   Treatment/Interventions Functional transfer training;LE strengthening/ROM; Therapeutic exercise; Endurance training;Patient/family training;Equipment eval/education; Bed mobility;Gait training;Spoke to nursing;OT;Compensatory technique education   Progress Progressing toward goals   PT Frequency   (5-6x/week)   Recommendation   Recommendation Short-term skilled PT;Post acute IP rehab   PT - OK to Discharge Yes        03/21/20 1245   Pain Assessment   Pain Assessment Tool Pain Assessment not indicated - pt denies pain   Restrictions/Precautions   Other Precautions Impulsive; Chair Alarm; Bed Alarm; Fall Risk   General   Chart Reviewed Yes   Family/Caregiver Present No   Cognition   Overall Cognitive Status Impaired Arousal/Participation Alert   Attention Attends with cues to redirect   Orientation Level Oriented X4   Memory Within functional limits   Following Commands Follows one step commands without difficulty   Subjective   Subjective " i can't believe what happened to me "    Bed Mobility   Rolling L 4  Minimal assistance   Additional items Assist x 1; Increased time required;Verbal cues;LE management;HOB elevated   Supine to Sit 4  Minimal assistance   Additional items Assist x 1;HOB elevated; Bedrails; Increased time required;Verbal cues;LE management; Impulsive   Transfers   Sit to Stand 3  Moderate assistance   Additional items Assist x 2;Armrests; Increased time required;Verbal cues; Impulsive   Stand to Sit 3  Moderate assistance   Additional items Assist x 2;Armrests; Increased time required;Verbal cues; Impulsive   Stand pivot 3  Moderate assistance   Additional items Assist x 2;Armrests; Increased time required;Verbal cues; Impulsive   Toilet transfer 3  Moderate assistance   Additional items Assist x 2;Armrests; Increased time required; Impulsive;Verbal cues; Commode   Additional Comments cues for handplacement and positioning on walker  A to position l hand on walker and to maintain    Ambulation/Elevation   Gait pattern L Hemiparesis; Poor UE support; Improper Weight shift; Forward Flexion;Decreased foot clearance;Narrow BOBBY; Inconsistent wesley; Foward flexed; Short stride; Step to;Excessively slow; Ataxia  (Left side lean)   Gait Assistance 3  Moderate assist  (mod- max assist x2 )   Additional items Assist x 2;Verbal cues; Tactile cues   Assistive Device Rolling walker   Distance 15' x1, 8' x1 seated rest break between gait trials      Balance   Static Sitting Fair  (once midline achieved with and w/out ue support)   Dynamic Sitting Fair  (without ue support)   Static Standing Poor   Dynamic Standing Poor   Ambulatory Poor -  (A x2)   Endurance Deficit   Endurance Deficit Yes   Endurance Deficit Description fatigue, weakness, Activity Tolerance   Activity Tolerance Patient limited by fatigue   Medical Staff Made Aware Keaton Lozada RN   Exercises   Heelslides Supine;10 reps;AROM; Left   Hip Flexion Supine;10 reps;AROM; Left   Hip Abduction Supine;10 reps;AROM; Left   Hip Adduction Supine;10 reps;AROM; Left  (to midline)   Knee AROM Long Arc Quad Sitting;10 reps;AROM; Left   Ankle Pumps Sitting;10 reps;AROM; Bilateral   Balance training  static sitting and dynamic sitting balacne perfomed at edge of bed,  raching with l and r ue's forward, corss body and to ane floor with close supervision to min assist x1, pt performed l lateral side bends pushing up to steated position with l ue x 5 reps  Noted reptropulsion while performing seated left le arom exercises requiring min assist to correct and maintain  x 10-12 minutes  Assessment   Prognosis Good   Problem List Decreased strength;Decreased endurance; Impaired balance;Decreased mobility; Decreased coordination;Decreased safety awareness; Impaired judgement; Impaired sensation   Assessment Pt  Seen for Pt pr POC  Less assistance for bed mobility min assist x1 required however pt continues to require mod assist x2 for transfers, stand pivot transfers, tranfers on and off bedside commode and mod- max assist x2 for gait training  increased assistance required with fatigue  Pt  Demonstrated l lean upon sitting at edge of bed requiring verbal and min assist to correct  Progressing to pt being able to self correct with visual cues ad then pt able to maintain with static sitting  Pt requires min assist to maintnain dynamic sitting balance at edge of bed while performing seated l le arom exercises noted retropulsion and inability to self correct requiring assistance  Pt requires verbal cues for proper handplacement and assistance for proper placement and positioning of L hand on Rw and assistance to maintain    Noted L hand falling off Rw x2 when no support/ assistance provided  Pt  Progressed with ambulation distances to 15' x1 and 8' x1 with mod- max assist x2 with constant verbal cues for le sequencing, proper step lengths, improved posture, positioning of l hand  Left side lean noted worsening with fatigue  Impaired weightshfiting requiring assistance  Pt requires one seated rest break  Cues and assistance for steering, turning and walker management  Cues for proper turning techniques requires  Pt performs supine an seated b/l le arom exercises cues to perform slowly for improved control and coordination  Pt is impulsive, with decreased awareness of deficits  Pt remained seated out of bed in recliner with chair alarm activated call bell in reach  Recommend inpt rehab at d/c  Goals   Patient Goals  to get better and get backt o doing what i was ding before "     STG Expiration Date 04/03/20   PT Treatment Day 1   Plan   Treatment/Interventions Functional transfer training;LE strengthening/ROM; Therapeutic exercise; Endurance training;Patient/family training;Equipment eval/education; Bed mobility;Gait training;Spoke to nursing;OT;Compensatory technique education   Progress Progressing toward goals   PT Frequency   (5-6x/week)   Recommendation   Recommendation Short-term skilled PT;Post acute IP rehab   PT - OK to Discharge Yes     Enma Rasheed, NICKIE

## 2020-03-21 NOTE — OCCUPATIONAL THERAPY NOTE
OccupationalTherapy Progress Note(time=1210-1250)     Patient Name: Carolyn ORTEGAJMANUEL Date: 3/21/2020  Problem List  Principal Problem:    Stroke [facility])  Active Problems:    HTN (hypertension)    Elevated troponin    Hypokalemia           Subjective:     03/21/20 1250   Restrictions/Precautions   Weight Bearing Precautions Per Order No   Other Precautions Fall Risk;Pain; Chair Alarm; Bed Alarm   Pain Assessment   Pain Assessment Tool Pain Assessment not indicated - pt denies pain   ADL   Where Assessed Chair   Eating Assistance 5  Supervision/Setup   Eating Deficit Setup   Toileting Assistance  5  Supervision/Setup   Toileting Deficit Steadying; Impulsive;Supervison/safety; Clothing management up;Clothing management down;Bedside commode   Functional Standing Tolerance   Time 1-2mins    Activity transfers   Bed Mobility   Rolling L 4  Minimal assistance   Additional items Assist x 1; Increased time required;Verbal cues;LE management   Supine to Sit 4  Minimal assistance   Additional items Assist x 1; Increased time required;Verbal cues;LE management   Transfers   Sit to Stand 3  Moderate assistance   Additional items Assist x 2; Increased time required;Verbal cues   Stand to Sit 3  Moderate assistance   Additional items Assist x 2; Increased time required;Verbal cues   Functional Mobility   Functional Mobility 2  Maximal assistance   Additional Comments x2   Additional items Rolling walker   Therapeutic Excerise-Strength   UE Strength   (L UE AAROM all planesx5-10reps)   Cognition   Overall Cognitive Status Impaired   Arousal/Participation Alert   Attention Attends with cues to redirect   Orientation Level Oriented X4   Memory Within functional limits   Following Commands Follows one step commands without difficulty   Comments decreased judgement/safety   Activity Tolerance   Activity Tolerance Patient tolerated treatment well   Medical Staff Made Aware nsg, P T      Assessment   Assessment Pt seen for 40 min tx session with focus on functional balance, functional mobility, L UE ROM, ADLs, transfer safety, and cognition  Pt able to tolerate OOB mobility; sitting balance=f/f-, standing balance=p+/p  Pt able to demonstrate good AROM/strength with L UE; requires verbal cues to limb awareness and assist with coordination activities(i e holding onto walker, self-feeding)  Pt able to demonstrate good memory, but limited transfer safety awareness(i e implusive)  Heavy assistance required for all functional mobility tasks  Pt continues to demonstrate appropriateness for inpt rehab to improve his overall level of independence  Will continue  Plan   Treatment Interventions ADL retraining;Functional transfer training;UE strengthening/ROM; Endurance training;Cognitive reorientation;Patient/family training;Equipment evaluation/education; Compensatory technique education   Goal Expiration Date 04/03/20   OT Treatment Day 2   OT Frequency 3-5x/wk   Recommendation   OT Discharge Recommendation Short Term Rehab   Barthel Index   Feeding 5   Bathing 0   Grooming Score 0   Dressing Score 5   Bladder Score 10   Bowels Score 10   Toilet Use Score 5   Transfers (Bed/Chair) Score 5   Mobility (Level Surface) Score 0   Stairs Score 0   Barthel Index Score 40   Gracie Simons, OT

## 2020-03-21 NOTE — PLAN OF CARE
07/22/18 0700   Quick Adds   Type of Visit Initial Occupational Therapy Evaluation   Living Environment   Lives With spouse   Living Arrangements house   Number of Stairs to Enter Home 3   Number of Stairs Within Home 12   Transportation Available car;family or friend will provide   Living Environment Comment Pt reports living with spouse in a multi-story home where needs are met within first floor. Pt reports having a walk-in shower and standard toilet seat.    Self-Care   Dominant Hand right   Usual Activity Tolerance good   Activity/Exercise Type biking   Exercise Amount/Frequency 3-5 times/wk   Equipment Currently Used at Home shower chair  (sock aid)   Activity/Exercise/Self-Care Comment Pt reports being retired and enjoys biking and golfing; is planning on getting back into walking after d/c.    Functional Level Prior   Ambulation 0-->independent   Transferring 0-->independent   Toileting 0-->independent   Bathing 0-->independent   Dressing 0-->independent   Eating 0-->independent   Communication 0-->understands/communicates without difficulty   Swallowing 0-->swallows foods/liquids without difficulty   Cognition 0 - no cognition issues reported   Fall history within last six months no   Which of the above functional risks had a recent onset or change? ambulation;transferring;toileting;bathing;dressing   Prior Functional Level Comment Pt reports I prior to admission.    General Information   Onset of Illness/Injury or Date of Surgery - Date 07/20/18   Referring Physician Shoaib Magaña MD   Patient/Family Goals Statement Home with assist    Additional Occupational Profile Info/Pertinent History of Current Problem Pt has a history of coronary artery disease status post stenting 3-4 years ago, hypertension, hyperlipidemia, sleep apnea, gout, and type 2 diabetes only on metformin who is POD#2 for L3-4 severe stenosis with bilateral synovial cyst compressing the dura and exiting and traversing roots  Problem: Potential for Falls  Goal: Patient will remain free of falls  Description  INTERVENTIONS:  - Assess patient frequently for physical needs  -  Identify cognitive and physical deficits and behaviors that affect risk of falls  -  [place] fall precautions as indicated by assessment   - Educate patient/family on patient safety including physical limitations  - Instruct patient to call for assistance with activity based on assessment  - Modify environment to reduce risk of injury  - Consider OT/PT consult to assist with strengthening/mobility  Outcome: Progressing     Problem: Neurological Deficit  Goal: Neurological status is stable or improving  Description  Interventions:  - Monitor and assess patient's level of consciousness, motor function, sensory function, and level of assistance needed for ADLs  - Monitor and report changes from baseline  Collaborate with interdisciplinary team to initiate plan and implement interventions as ordered  - Provide and maintain a safe environment  - Consider seizure precautions  - Consider fall precautions  - Consider aspiration precautions  - Consider bleeding precautions  Outcome: Progressing     Problem: Activity Intolerance/Impaired Mobility  Goal: Mobility/activity is maintained at optimum level for patient  Description  Interventions:  - Assess and monitor patient  barriers to mobility and need for assistive/adaptive devices  - Assess patient's emotional response to limitations  - Collaborate with interdisciplinary team and initiate plans and interventions as ordered  - Encourage independent activity per ability   - Maintain proper body alignment  - Perform active/passive rom as tolerated/ordered    - Plan activities to conserve energy   - Turn patient as appropriate  Outcome: Progressing     Problem: Communication Impairment  Goal: Ability to express needs and understand communication  Description  Assess patient's communication skills and ability to and L4-5 grade 1 spondylolisthesis with severe stenosis and bilateral synovial cyst and compression of dura and exiting and traversing roots on 7/20/2018.    Precautions/Limitations spinal precautions   General Info Comments Spinal corset on when OOB   Cognitive Status Examination   Orientation orientation to person, place and time   Level of Consciousness alert   Able to Follow Commands WNL/WFL   Personal Safety (Cognitive) WNL/WFL   Memory intact   Cognitive Comment No deficits identified during initial evaluation   Visual Perception   Visual Perception Wears glasses   Pain Assessment   Patient Currently in Pain Yes, see Vital Sign flowsheet  (3-4)   Range of Motion (ROM)   ROM Comment Pt has spinal precautions    Strength   Strength Comments Reduced strength and endurance    Hand Strength   Hand Strength Comments No deficits identified during initial evaluation    Coordination   Upper Extremity Coordination No deficits were identified   Coordination Comments 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   Transfer Skills   Transfer Comments Initiated; review daily note for more detail.    Transfer Skill: Sit to Stand   Level of [place]: Sit/Stand stand-by assist   Physical Assist/Nonphysical Assist: Sit/Stand supervision   Transfer Skill: Sit to Stand full weight-bearing   Toilet Transfer   Toilet Transfer Comments Initiated; review daily note for more detail.    Transfer Skill: Toilet Transfer   Level of [place]: Toilet contact guard   Physical Assist/Nonphysical Assist: Toilet supervision   Weight-Bearing Restrictions: Toilet full weight-bearing   Toilet Transfer Skill Comments Initiated; review daily note for more detail.    Tub/Shower Transfer   Tub/Shower Transfer Comments Initiated; review daily note for more detail.    Balance   Balance Comments No LOB observed during initial evaluation.    Upper Body Dressing   Level of [place]: Dress Upper Body stand-by assist   Physical  understand information  Patient will demonstrate use of effective communication techniques, alternative methods of communication and understanding even if not able to speak  - Encourage communication and provide alternate methods of communication as needed  - Collaborate with case management/ for discharge needs  - Include patient/family/caregiver in decisions related to communication  Outcome: Progressing     Problem: Potential for Aspiration  Goal: Non-ventilated patient's risk of aspiration is minimized  Description  Assess and monitor vital signs, respiratory status, and labs (WBC)  Monitor for signs of aspiration (tachypnea, cough, rales, wheezing, cyanosis, fever)  - Assess and monitor patient's ability to swallow  - Place patient up in chair to eat if possible  - HOB up at 90 degrees to eat if unable to get patient up into chair   - Supervise patient during oral intake  - Instruct patient/ family to take small bites  - Instruct patient/ family to take small single sips when taking liquids  - Follow patient-specific strategies generated by speech pathologist   Outcome: Progressing     Problem: NEUROSENSORY - ADULT  Goal: Achieves stable or improved neurological status  Description  INTERVENTIONS  - Monitor and report changes in neurological status  - Monitor vital signs such as temperature, blood pressure, glucose, and any other labs ordered   - Initiate measures to prevent increased intracranial pressure  - Monitor for seizure activity and implement precautions if appropriate      Outcome: Progressing  Goal: Achieves maximal functionality and self care  Description  INTERVENTIONS  - Monitor swallowing and airway patency with patient fatigue and changes in neurological status  - Encourage and assist patient to increase activity and self care     - Encourage visually impaired, hearing impaired and aphasic patients to use assistive/communication devices  Outcome: Progressing "Assist/Nonphysical Assist: Dress Upper Body supervision   Lower Body Dressing   Level of [place]: Dress Lower Body stand-by assist   Physical Assist/Nonphysical Assist: Dress Lower Body supervision   Assistive Device sock-aid   Toileting   Level of [place]: Toilet stand-by assist   Physical Assist/Nonphysical Assist: Toilet supervision   Instrumental Activities of Daily Living (IADL)   Previous Responsibilities meal prep;housekeeping;laundry;medication management;finances;driving   IADL Comments Pt reports I prior to admission   Activities of Daily Living Analysis   Impairments Contributing to Impaired Activities of Daily Living pain;ROM decreased;strength decreased   General Therapy Interventions   Planned Therapy Interventions ADL retraining;IADL retraining;progressive activity/exercise   Clinical Impression   Criteria for Skilled Therapeutic Interventions Met yes, treatment indicated   OT Diagnosis Reduced I/ADLs   Influenced by the following impairments Pain, Reduced ROM due to spinal precautions and decreased strength and endurance.    Assessment of Occupational Performance 5 or more Performance Deficits   Identified Performance Deficits Driving, showering, dressing, home mgmt, functional mobility   Clinical Decision Making (Complexity) Low complexity   Therapy Frequency daily   Predicted Duration of Therapy Intervention (days/wks) 1x evaluation and treatment    Anticipated Equipment Needs at Discharge bath sponge;reacher   Anticipated Discharge Disposition Home with Assist   Risks and Benefits of Treatment have been explained. Yes   Patient, Family & other staff in agreement with plan of care Yes   [facility] pocketfungames-PAC TM \"6 Clicks\"   2016, Trustees of [facility], under license to [employer].  All rights reserved.   6 Clicks Short Forms Daily Activity Inpatient Short Form   [facility] AM-PAC  \"6 Clicks\" Daily Activity Inpatient Short Form   1. Putting on and taking off regular " Problem: CARDIOVASCULAR - ADULT  Goal: Absence of cardiac dysrhythmias or at baseline rhythm  Description  INTERVENTIONS:  - Continuous cardiac monitoring, vital signs, obtain 12 lead EKG if ordered  - Administer antiarrhythmic and heart rate control medications as ordered  - Monitor electrolytes and administer replacement therapy as ordered  Outcome: Progressing     Problem: GASTROINTESTINAL - ADULT  Goal: Minimal or absence of nausea and/or vomiting  Description  INTERVENTIONS:  - Administer IV fluids if ordered to ensure adequate hydration  - Maintain NPO status until nausea and vomiting are resolved  - Nasogastric tube if ordered  - Administer ordered antiemetic medications as needed  - Provide nonpharmacologic comfort measures as appropriate  - Advance diet as tolerated, if ordered  - Consider nutrition services referral to assist patient with adequate nutrition and appropriate food choices  Outcome: Progressing     Problem: METABOLIC, FLUID AND ELECTROLYTES - ADULT  Goal: Glucose maintained within target range  Description  INTERVENTIONS:  - Monitor Blood Glucose as ordered  - Assess for signs and symptoms of hyperglycemia and hypoglycemia  - Administer ordered medications to maintain glucose within target range  - Assess nutritional intake and initiate nutrition service referral as needed  Outcome: Progressing     Problem: PAIN - ADULT  Goal: Verbalizes/displays adequate comfort level or baseline comfort level  Description  Interventions:  - Encourage patient to monitor pain and request assistance  - Assess pain using appropriate pain scale  - Administer analgesics based on type and severity of pain and evaluate response  - Implement non-pharmacological measures as appropriate and evaluate response  - Consider cultural and social influences on pain and pain management  - Notify physician/advanced practitioner if interventions unsuccessful or patient reports new pain  Outcome: Progressing     Problem: DISCHARGE PLANNING  Goal: Discharge to home or other facility with appropriate resources  Description  INTERVENTIONS:  - Identify barriers to discharge w/patient and caregiver  - Arrange for needed discharge resources and transportation as appropriate  - Identify discharge learning needs (meds, wound care, etc )  - Arrange for interpretive services to assist at discharge as needed  - Refer to Case Management Department for coordinating discharge planning if the patient needs post-hospital services based on physician/advanced practitioner order or complex needs related to functional status, cognitive ability, or social support system  Outcome: Progressing     Problem: Knowledge Deficit  Goal: Patient/family/caregiver demonstrates understanding of disease process, treatment plan, medications, and discharge instructions  Description  Complete learning assessment and assess knowledge base  Interventions:  - Provide teaching at level of understanding  - Provide teaching via preferred learning methods  Outcome: Progressing     Problem: Nutrition/Hydration-ADULT  Goal: Nutrient/Hydration intake appropriate for improving, restoring or maintaining nutritional needs  Description  Monitor and assess patient's nutrition/hydration status for malnutrition  Collaborate with interdisciplinary team and initiate plan and interventions as ordered  Monitor patient's weight and dietary intake as ordered or per policy  Utilize nutrition screening tool and intervene as necessary  Determine patient's food preferences and provide high-protein, high-caloric foods as appropriate       INTERVENTIONS:  - Monitor oral intake, urinary output, labs, and treatment plans  - Assess nutrition and hydration status and recommend course of action  - Evaluate amount of meals eaten  - Assist patient with eating if necessary   - Allow adequate time for meals  - Recommend/ encourage appropriate diets, oral nutritional supplements, and vitamin/mineral lower body clothing? 3 - A Little   2. Bathing (including washing, rinsing, drying)? 3 - A Little   3. Toileting, which includes using toilet, bedpan or urinal? 3 - A Little   4. Putting on and taking off regular upper body clothing? 3 - A Little   5. Taking care of personal grooming such as brushing teeth? 3 - A Little   6. Eating meals? 4 - None   Daily Activity Raw Score (Score out of 24.Lower scores equate to lower levels of function) 19   Total Evaluation Time   Total Evaluation Time (Minutes) 10      supplements  - Order, calculate, and assess calorie counts as needed  - Recommend, monitor, and adjust tube feedings and TPN/PPN based on assessed needs  - Assess need for intravenous fluids  - Provide specific nutrition/hydration education as appropriate  - Include patient/family/caregiver in decisions related to nutrition  Outcome: Progressing     Problem: Prexisting or High Potential for Compromised Skin Integrity  Goal: Skin integrity is maintained or improved  Description  INTERVENTIONS:  - Identify patients at risk for skin breakdown  - Assess and monitor skin integrity  - Assess and monitor nutrition and hydration status  - Monitor labs   - Assess for incontinence   - Turn and reposition patient  - Assist with mobility/ambulation  - Relieve pressure over bony prominences  - Avoid friction and shearing  - Provide appropriate hygiene as needed including keeping skin clean and dry  - Evaluate need for skin moisturizer/barrier cream  - Collaborate with interdisciplinary team   - Patient/family teaching  - Consider wound care consult   Outcome: Progressing

## 2020-03-21 NOTE — PROGRESS NOTES
Progress Note - Maulik Arvizu 1945, 66 y o  male MRN: [id number]    Unit/Bed#: E4 -01 Encounter: [id number]    Primary Care Provider: No primary care provider on file  Date and time admitted to hospital: 3/20/2020 12:15 AM        * Stroke [facility])  Assessment & Plan  Patient presenting with Left-sided upper and lower extremity weakness which he suddenly experienced when repairing pipe in the basement followed by a fall backwards, denies head trauma  Extensive intracranial disease demonstrated on CTA head - showing severe focal stenosis of proximal left M Segment and severe stenosis of the distal right V4 segment  MRI brain revealed "Acute right thalamic lacunar infarction" not demonstrated on CT head   Patient presented out of window for tPA  Continue ASA/plavix as well as high-intensity statin  Echo unremarkable, preserved EF, grade 1 diastolic dysfunction   Neurology recommendations appreciated  Patient [facility]     Hypokalemia  Assessment & Plan  Oral replacement  Monitor BMP and Mg  Diet advanced     Elevated troponin  Assessment & Plan  Non-MI elevation due to stroke/accelerated HTN   No chest pain       HTN (hypertension)  Assessment & Plan  Patient initially presented with accelerated hypertension - antihypertensives were held for permissive hypertension  Last BP check in acceptable range  Will await recommendations from Neurology if okay to resume home BP medications       VTE Pharmacologic Prophylaxis:   Pharmacologic: Enoxaparin (Lovenox)  Mechanical VTE Prophylaxis in Place: Yes    Patient Centered Rounds: I have performed bedside rounds with nursing staff today  Discussions with Specialists or Other Care Team Provider: LUI will d/w Neurology    Education and Discussions with Family / Patient: Patient    Time Spent for Care: 45 minutes  More than 50% of total time spent on counseling and coordination of care as described above      Current Length of Stay: 1 day(s)    Current Patient Status: Inpatient   Certification Statement: The patient will continue to require additional inpatient hospital stay due to close monitoring     Discharge Plan: STR    Code Status: Level 1 - Full Code      Subjective:   Patient seen and examined  He voices no complaints  L-sided weakness persists  No o/n events  Objective:     Vitals:   Temp (24hrs), Av 2 °F (36 8 °C), Min:97 7 °F (36 5 °C), Max:98 6 °F (37 °C)    Temp:  [97 7 °F (36 5 °C)-98 6 °F (37 °C)] 97 7 °F (36 5 °C)  HR:  [55-66] 66  Resp:  [18] 18  BP: (140-216)/() 140/73  SpO2:  [87 %-96 %] 93 %  Body mass index is 22 27 kg/m²  Input and Output Summary (last 24 hours): Intake/Output Summary (Last 24 hours) at 3/21/2020 1355  Last data filed at 3/21/2020 0900  Gross per 24 hour   Intake 1165 ml   Output 800 ml   Net 365 ml       Physical Exam:     Physical Exam   Constitutional: He is oriented to person, place, and time  No distress  HENT:   Head: Normocephalic and atraumatic  Eyes: Conjunctivae are normal    Neck: No JVD present  Cardiovascular: Normal rate and regular rhythm  No murmur heard  Pulmonary/Chest: Effort normal  No respiratory distress  He has no wheezes  He has no rales  Abdominal: Soft  He exhibits no distension  There is no tenderness  There is no guarding  Musculoskeletal: He exhibits no edema  Neurological: He is alert and oriented to person, place, and time  Coordination (LUE ) abnormal    Left arm drift   Skin: Skin is warm and dry  Psychiatric: He has a normal mood and affect         Additional Data:     Labs:    Results from last 7 days   Lab Units 20  0443   WBC Thousand/uL 8 27   HEMOGLOBIN g/dL 12 0   HEMATOCRIT % 36 9   PLATELETS Thousands/uL 210   NEUTROS PCT % 70   LYMPHS PCT % 18   MONOS PCT % 11   EOS PCT % 1     Results from last 7 days   Lab Units 20  0443   SODIUM mmol/L 142   POTASSIUM mmol/L 3 0*   CHLORIDE mmol/L 106   CO2 mmol/L 24   BUN mg/dL 10   CREATININE mg/dL 0 81   ANION GAP mmol/L 12   CALCIUM mg/dL 8 3   GLUCOSE RANDOM mg/dL 83     Results from last 7 days   Lab Units 03/20/20  0026   INR  1 02     Results from last 7 days   Lab Units 03/20/20  0057   POC GLUCOSE mg/dl 137     Results from last 7 days   Lab Units 03/20/20  0635   HEMOGLOBIN A1C % 6 1*               * I Have Reviewed All Lab Data Listed Above  * Additional Pertinent Lab Tests Reviewed: Andreainglan 64 Admission Reviewed    Imaging:    Imaging Reports Reviewed Today Include: MRI brain      Recent Cultures (last 7 days):           Last 24 Hours Medication List:     Current Facility-Administered Medications:  acetaminophen 650 mg Oral Q4H PRN NIGEL Serrtao-TOMASZ   aspirin 81 mg Oral Daily NIGEL Davila-TOMASZ   atorvastatin 40 mg Oral Daily With [place], PA-TOMASZ   calcium carbonate 1,000 mg Oral Daily PRN NIGEL Serrato-TOMASZ   clopidogrel 75 mg Oral Daily NIGEL Davila-TOMASZ   enoxaparin 40 mg Subcutaneous Daily Maricarmen Campos PA-C   iohexol 85 mL Intravenous Once in imaging Yasmeen Luis DO   ondansetron 4 mg Intravenous Q6H PRN NIGEL Serrato-TOMASZ   potassium chloride 40 mEq Oral BID Selam Martinez MD        Today, Patient Was Seen By: Luis Daniel Adkins MD    ** Please Note: Dictation voice to text software may have been used in the creation of this document   **

## 2020-03-21 NOTE — PLAN OF CARE
Problem: PHYSICAL THERAPY ADULT  Goal: Performs mobility at highest level of function for planned discharge setting  See evaluation for individualized goals  Description  Treatment/Interventions: Functional transfer training, LE strengthening/ROM, Therapeutic exercise, Elevations, Endurance training, Patient/family training, Equipment eval/education, Bed mobility, Gait training, Compensatory technique education, Continued evaluation, OT  Equipment Recommended: William Paget       See flowsheet documentation for full assessment, interventions and recommendations  Outcome: Progressing  Note:   Prognosis: Good  Problem List: Decreased strength, Decreased endurance, Impaired balance, Decreased mobility, Decreased coordination, Decreased safety awareness, Impaired judgement, Impaired sensation  Assessment: Pt  Seen for Pt pr POC  Less assistance for bed mobility min assist x1 required however pt continues to require mod assist x2 for transfers, stand pivot transfers, tranfers on and off bedside commode and mod- max assist x2 for gait training  increased assistance required with fatigue  Pt  Demonstrated l lean upon sitting at edge of bed requiring verbal and min assist to correct  Progressing to pt being able to self correct with visual cues ad then pt able to maintain with static sitting  Pt requires min assist to maintnain dynamic sitting balance at edge of bed while performing seated l le arom exercises noted retropulsion and inability to self correct requiring assistance  Pt requires verbal cues for proper handplacement and assistance for proper placement and positioning of L hand on Rw and assistance to maintain  Noted L hand falling off Rw x2 when no support/ assistance provided  Pt  Progressed with ambulation distances to 15' x1 and 8' x1 with mod- max assist x2 with constant verbal cues for le sequencing, proper step lengths, improved posture, positioning of l hand    Left side lean noted worsening with fatigue  Impaired weightshfiting requiring assistance  Pt requires one seated rest break  Cues and assistance for steering, turning and walker management  Cues for proper turning techniques requires  Pt performs supine an seated b/l le arom exercises cues to perform slowly for improved control and coordination  Pt is impulsive, with decreased awareness of deficits  Pt remained seated out of bed in recliner with chair alarm activated call bell in reach  Recommend inpt rehab at d/c      Barriers to Discharge: Inaccessible home environment, Decreased caregiver support  Barriers to Discharge Comments: +steps, lives alone  Recommendation: Short-term skilled PT, Post acute IP rehab     PT - OK to Discharge: Yes    See flowsheet documentation for full assessment

## 2020-03-21 NOTE — PLAN OF CARE
Problem: OCCUPATIONAL THERAPY ADULT  Goal: Performs self-care activities at highest level of function for planned discharge setting  See evaluation for individualized goals  Description  Treatment Interventions: ADL retraining, UE strengthening/ROM, Functional transfer training, Endurance training, Cognitive reorientation, Patient/family training, Equipment evaluation/education, Compensatory technique education, Energy conservation, Activityengagement          See flowsheet documentation for full assessment, interventions and recommendations  Note:   Limitation: Decreased ADL status, Decreased UE strength, Decreased Safe judgement during ADL, Decreased cognition, Decreased endurance, Decreased high-level ADLs, Decreased self-care trans, Decreased fine motor control, Decreased UE ROM, Decreased sensation, Non-func L UE  Prognosis: Good  Assessment: Pt seen for 40 min tx session with focus on functional balance, functional mobility, L UE ROM, ADLs, transfer safety, and cognition  Pt able to tolerate OOB mobility; sitting balance=f/f-, standing balance=p+/p  Pt able to demonstrate good AROM/strength with L UE; requires verbal cues to limb awareness and assist with coordination activities(i e holding onto walker, self-feeding)  Pt able to demonstrate good memory, but limited transfer safety awareness(i e implusive)  Heavy assistance required for all functional mobility tasks  Pt continues to demonstrate appropriateness for inpt rehab to improve his overall level of independence  Will continue        OT Discharge Recommendation: Short Term Rehab  OT - OK to Discharge: (to rehab when medically stable)

## 2020-03-21 NOTE — PLAN OF CARE
Problem: Potential for Falls  Goal: Patient will remain free of falls  Description  INTERVENTIONS:  - Assess patient frequently for physical needs  -  Identify cognitive and physical deficits and behaviors that affect risk of falls  -  [place] fall precautions as indicated by assessment   - Educate patient/family on patient safety including physical limitations  - Instruct patient to call for assistance with activity based on assessment  - Modify environment to reduce risk of injury  - Consider OT/PT consult to assist with strengthening/mobility  Outcome: Progressing     Problem: Neurological Deficit  Goal: Neurological status is stable or improving  Description  Interventions:  - Monitor and assess patient's level of consciousness, motor function, sensory function, and level of assistance needed for ADLs  - Monitor and report changes from baseline  Collaborate with interdisciplinary team to initiate plan and implement interventions as ordered  - Provide and maintain a safe environment  - Consider seizure precautions  - Consider fall precautions  - Consider aspiration precautions  - Consider bleeding precautions  Outcome: Progressing     Problem: Activity Intolerance/Impaired Mobility  Goal: Mobility/activity is maintained at optimum level for patient  Description  Interventions:  - Assess and monitor patient  barriers to mobility and need for assistive/adaptive devices  - Assess patient's emotional response to limitations  - Collaborate with interdisciplinary team and initiate plans and interventions as ordered  - Encourage independent activity per ability   - Maintain proper body alignment  - Perform active/passive rom as tolerated/ordered    - Plan activities to conserve energy   - Turn patient as appropriate  Outcome: Progressing     Problem: Communication Impairment  Goal: Ability to express needs and understand communication  Description  Assess patient's communication skills and ability to understand information  Patient will demonstrate use of effective communication techniques, alternative methods of communication and understanding even if not able to speak  - Encourage communication and provide alternate methods of communication as needed  - Collaborate with case management/ for discharge needs  - Include patient/family/caregiver in decisions related to communication  Outcome: Progressing     Problem: Potential for Aspiration  Goal: Non-ventilated patient's risk of aspiration is minimized  Description  Assess and monitor vital signs, respiratory status, and labs (WBC)  Monitor for signs of aspiration (tachypnea, cough, rales, wheezing, cyanosis, fever)  - Assess and monitor patient's ability to swallow  - Place patient up in chair to eat if possible  - HOB up at 90 degrees to eat if unable to get patient up into chair   - Supervise patient during oral intake  - Instruct patient/ family to take small bites  - Instruct patient/ family to take small single sips when taking liquids  - Follow patient-specific strategies generated by speech pathologist   Outcome: Progressing     Problem: NEUROSENSORY - ADULT  Goal: Achieves stable or improved neurological status  Description  INTERVENTIONS  - Monitor and report changes in neurological status  - Monitor vital signs such as temperature, blood pressure, glucose, and any other labs ordered   - Initiate measures to prevent increased intracranial pressure  - Monitor for seizure activity and implement precautions if appropriate      Outcome: Progressing  Goal: Achieves maximal functionality and self care  Description  INTERVENTIONS  - Monitor swallowing and airway patency with patient fatigue and changes in neurological status  - Encourage and assist patient to increase activity and self care     - Encourage visually impaired, hearing impaired and aphasic patients to use assistive/communication devices  Outcome: Progressing Problem: CARDIOVASCULAR - ADULT  Goal: Absence of cardiac dysrhythmias or at baseline rhythm  Description  INTERVENTIONS:  - Continuous cardiac monitoring, vital signs, obtain 12 lead EKG if ordered  - Administer antiarrhythmic and heart rate control medications as ordered  - Monitor electrolytes and administer replacement therapy as ordered  Outcome: Progressing     Problem: GASTROINTESTINAL - ADULT  Goal: Minimal or absence of nausea and/or vomiting  Description  INTERVENTIONS:  - Administer IV fluids if ordered to ensure adequate hydration  - Maintain NPO status until nausea and vomiting are resolved  - Nasogastric tube if ordered  - Administer ordered antiemetic medications as needed  - Provide nonpharmacologic comfort measures as appropriate  - Advance diet as tolerated, if ordered  - Consider nutrition services referral to assist patient with adequate nutrition and appropriate food choices  Outcome: Progressing     Problem: METABOLIC, FLUID AND ELECTROLYTES - ADULT  Goal: Glucose maintained within target range  Description  INTERVENTIONS:  - Monitor Blood Glucose as ordered  - Assess for signs and symptoms of hyperglycemia and hypoglycemia  - Administer ordered medications to maintain glucose within target range  - Assess nutritional intake and initiate nutrition service referral as needed  Outcome: Progressing     Problem: PAIN - ADULT  Goal: Verbalizes/displays adequate comfort level or baseline comfort level  Description  Interventions:  - Encourage patient to monitor pain and request assistance  - Assess pain using appropriate pain scale  - Administer analgesics based on type and severity of pain and evaluate response  - Implement non-pharmacological measures as appropriate and evaluate response  - Consider cultural and social influences on pain and pain management  - Notify physician/advanced practitioner if interventions unsuccessful or patient reports new pain  Outcome: Progressing     Problem: DISCHARGE PLANNING  Goal: Discharge to home or other facility with appropriate resources  Description  INTERVENTIONS:  - Identify barriers to discharge w/patient and caregiver  - Arrange for needed discharge resources and transportation as appropriate  - Identify discharge learning needs (meds, wound care, etc )  - Arrange for interpretive services to assist at discharge as needed  - Refer to Case Management Department for coordinating discharge planning if the patient needs post-hospital services based on physician/advanced practitioner order or complex needs related to functional status, cognitive ability, or social support system  Outcome: Progressing     Problem: Knowledge Deficit  Goal: Patient/family/caregiver demonstrates understanding of disease process, treatment plan, medications, and discharge instructions  Description  Complete learning assessment and assess knowledge base  Interventions:  - Provide teaching at level of understanding  - Provide teaching via preferred learning methods  Outcome: Progressing     Problem: Nutrition/Hydration-ADULT  Goal: Nutrient/Hydration intake appropriate for improving, restoring or maintaining nutritional needs  Description  Monitor and assess patient's nutrition/hydration status for malnutrition  Collaborate with interdisciplinary team and initiate plan and interventions as ordered  Monitor patient's weight and dietary intake as ordered or per policy  Utilize nutrition screening tool and intervene as necessary  Determine patient's food preferences and provide high-protein, high-caloric foods as appropriate       INTERVENTIONS:  - Monitor oral intake, urinary output, labs, and treatment plans  - Assess nutrition and hydration status and recommend course of action  - Evaluate amount of meals eaten  - Assist patient with eating if necessary   - Allow adequate time for meals  - Recommend/ encourage appropriate diets, oral nutritional supplements, and vitamin/mineral supplements  - Order, calculate, and assess calorie counts as needed  - Recommend, monitor, and adjust tube feedings and TPN/PPN based on assessed needs  - Assess need for intravenous fluids  - Provide specific nutrition/hydration education as appropriate  - Include patient/family/caregiver in decisions related to nutrition  Outcome: Progressing     Problem: Prexisting or High Potential for Compromised Skin Integrity  Goal: Skin integrity is maintained or improved  Description  INTERVENTIONS:  - Identify patients at risk for skin breakdown  - Assess and monitor skin integrity  - Assess and monitor nutrition and hydration status  - Monitor labs   - Assess for incontinence   - Turn and reposition patient  - Assist with mobility/ambulation  - Relieve pressure over bony prominences  - Avoid friction and shearing  - Provide appropriate hygiene as needed including keeping skin clean and dry  - Evaluate need for skin moisturizer/barrier cream  - Collaborate with interdisciplinary team   - Patient/family teaching  - Consider wound care consult   Outcome: Progressing

## 2020-03-22 PROBLEM — I63.81 ACUTE LACUNAR STROKE (HCC): Status: ACTIVE | Noted: 2020-03-20

## 2020-03-22 LAB
ANION GAP SERPL CALCULATED.3IONS-SCNC: 9 MMOL/L (ref 4–13)
BASOPHILS # BLD AUTO: 0.03 THOUSANDS/ΜL (ref 0–0.1)
BASOPHILS NFR BLD AUTO: 0 % (ref 0–1)
BUN SERPL-MCNC: 9 MG/DL (ref 5–25)
CALCIUM SERPL-MCNC: 8.9 MG/DL (ref 8.3–10.1)
CHLORIDE SERPL-SCNC: 105 MMOL/L (ref 100–108)
CO2 SERPL-SCNC: 26 MMOL/L (ref 21–32)
CREAT SERPL-MCNC: 0.73 MG/DL (ref 0.6–1.3)
EOSINOPHIL # BLD AUTO: 0.13 THOUSAND/ΜL (ref 0–0.61)
EOSINOPHIL NFR BLD AUTO: 2 % (ref 0–6)
ERYTHROCYTE [DISTWIDTH] IN BLOOD BY AUTOMATED COUNT: 13.2 % (ref 11.6–15.1)
GFR SERPL CREATININE-BSD FRML MDRD: 90 ML/MIN/1.73SQ M
GLUCOSE SERPL-MCNC: 112 MG/DL (ref 65–140)
HCT VFR BLD AUTO: 37.4 % (ref 36.5–49.3)
HGB BLD-MCNC: 12.2 G/DL (ref 12–17)
IMM GRANULOCYTES # BLD AUTO: 0.02 THOUSAND/UL (ref 0–0.2)
IMM GRANULOCYTES NFR BLD AUTO: 0 % (ref 0–2)
LYMPHOCYTES # BLD AUTO: 1.96 THOUSANDS/ΜL (ref 0.6–4.47)
LYMPHOCYTES NFR BLD AUTO: 26 % (ref 14–44)
MAGNESIUM SERPL-MCNC: 1.9 MG/DL (ref 1.6–2.6)
MCH RBC QN AUTO: 29 PG (ref 26.8–34.3)
MCHC RBC AUTO-ENTMCNC: 32.6 G/DL (ref 31.4–37.4)
MCV RBC AUTO: 89 FL (ref 82–98)
MONOCYTES # BLD AUTO: 0.94 THOUSAND/ΜL (ref 0.17–1.22)
MONOCYTES NFR BLD AUTO: 13 % (ref 4–12)
NEUTROPHILS # BLD AUTO: 4.41 THOUSANDS/ΜL (ref 1.85–7.62)
NEUTS SEG NFR BLD AUTO: 59 % (ref 43–75)
NRBC BLD AUTO-RTO: 0 /100 WBCS
PLATELET # BLD AUTO: 214 THOUSANDS/UL (ref 149–390)
PMV BLD AUTO: 10.1 FL (ref 8.9–12.7)
POTASSIUM SERPL-SCNC: 3.3 MMOL/L (ref 3.5–5.3)
RBC # BLD AUTO: 4.2 MILLION/UL (ref 3.88–5.62)
SODIUM SERPL-SCNC: 140 MMOL/L (ref 136–145)
WBC # BLD AUTO: 7.49 THOUSAND/UL (ref 4.31–10.16)

## 2020-03-22 PROCEDURE — 80048 BASIC METABOLIC PNL TOTAL CA: CPT | Performed by: FAMILY MEDICINE

## 2020-03-22 PROCEDURE — 99232 SBSQ HOSP IP/OBS MODERATE 35: CPT | Performed by: PSYCHIATRY & NEUROLOGY

## 2020-03-22 PROCEDURE — 99232 SBSQ HOSP IP/OBS MODERATE 35: CPT | Performed by: FAMILY MEDICINE

## 2020-03-22 PROCEDURE — 83735 ASSAY OF MAGNESIUM: CPT | Performed by: FAMILY MEDICINE

## 2020-03-22 PROCEDURE — 85025 COMPLETE CBC W/AUTO DIFF WBC: CPT | Performed by: FAMILY MEDICINE

## 2020-03-22 RX ORDER — LABETALOL 20 MG/4 ML (5 MG/ML) INTRAVENOUS SYRINGE
10 EVERY 4 HOURS PRN
Status: DISCONTINUED | OUTPATIENT
Start: 2020-03-22 | End: 2020-03-25 | Stop reason: HOSPADM

## 2020-03-22 RX ORDER — POTASSIUM CHLORIDE 20 MEQ/1
40 TABLET, EXTENDED RELEASE ORAL 2 TIMES DAILY
Status: COMPLETED | OUTPATIENT
Start: 2020-03-22 | End: 2020-03-22

## 2020-03-22 RX ORDER — METOPROLOL SUCCINATE 50 MG/1
100 TABLET, EXTENDED RELEASE ORAL DAILY
Status: DISCONTINUED | OUTPATIENT
Start: 2020-03-22 | End: 2020-03-25 | Stop reason: HOSPADM

## 2020-03-22 RX ORDER — LISINOPRIL 20 MG/1
40 TABLET ORAL DAILY
Status: DISCONTINUED | OUTPATIENT
Start: 2020-03-22 | End: 2020-03-25 | Stop reason: HOSPADM

## 2020-03-22 RX ORDER — ATORVASTATIN CALCIUM 80 MG/1
80 TABLET, FILM COATED ORAL
Status: DISCONTINUED | OUTPATIENT
Start: 2020-03-22 | End: 2020-03-25 | Stop reason: HOSPADM

## 2020-03-22 RX ADMIN — ASPIRIN 81 MG: 81 TABLET, COATED ORAL at 09:07

## 2020-03-22 RX ADMIN — LABETALOL 20 MG/4 ML (5 MG/ML) INTRAVENOUS SYRINGE 10 MG: at 23:27

## 2020-03-22 RX ADMIN — LISINOPRIL 40 MG: 20 TABLET ORAL at 09:07

## 2020-03-22 RX ADMIN — CLOPIDOGREL BISULFATE 75 MG: 75 TABLET ORAL at 09:07

## 2020-03-22 RX ADMIN — POTASSIUM CHLORIDE 40 MEQ: 1500 TABLET, EXTENDED RELEASE ORAL at 12:13

## 2020-03-22 RX ADMIN — ENOXAPARIN SODIUM 40 MG: 40 INJECTION SUBCUTANEOUS at 09:07

## 2020-03-22 RX ADMIN — POTASSIUM CHLORIDE 40 MEQ: 1500 TABLET, EXTENDED RELEASE ORAL at 17:00

## 2020-03-22 RX ADMIN — METOPROLOL SUCCINATE 100 MG: 50 TABLET, EXTENDED RELEASE ORAL at 09:07

## 2020-03-22 RX ADMIN — HYDRALAZINE HYDROCHLORIDE 10 MG: 20 INJECTION INTRAMUSCULAR; INTRAVENOUS at 15:17

## 2020-03-22 RX ADMIN — LABETALOL 20 MG/4 ML (5 MG/ML) INTRAVENOUS SYRINGE 10 MG: at 17:31

## 2020-03-22 RX ADMIN — ATORVASTATIN CALCIUM 80 MG: 80 TABLET, FILM COATED ORAL at 16:59

## 2020-03-22 NOTE — ASSESSMENT & PLAN NOTE
Patient presenting with Left-sided upper and lower extremity weakness which he suddenly experienced when repairing pipe in the basement followed by a fall backwards, denies head trauma  Extensive intracranial disease demonstrated on CTA head - showing severe focal stenosis of proximal left M Segment and severe stenosis of the distal right V4 segment    MRI brain revealed "Acute right thalamic lacunar infarction" not demonstrated on CT head   Patient presented out of window for tPA  Continue ASA/plavix as well as high-intensity statin  Echo unremarkable, preserved EF, grade 1 diastolic dysfunction   Neurology recommendations appreciated  Resume home BP medications  Patient to be [facility] to [facility] vs Acute rehab - PMR consult placed, input appreicated

## 2020-03-22 NOTE — PLAN OF CARE
Problem: Potential for Falls  Goal: Patient will remain free of falls  Description  INTERVENTIONS:  - Assess patient frequently for physical needs  -  Identify cognitive and physical deficits and behaviors that affect risk of falls  -  [place] fall precautions as indicated by assessment   - Educate patient/family on patient safety including physical limitations  - Instruct patient to call for assistance with activity based on assessment  - Modify environment to reduce risk of injury  - Consider OT/PT consult to assist with strengthening/mobility  Outcome: Progressing     Problem: Neurological Deficit  Goal: Neurological status is stable or improving  Description  Interventions:  - Monitor and assess patient's level of consciousness, motor function, sensory function, and level of assistance needed for ADLs  - Monitor and report changes from baseline  Collaborate with interdisciplinary team to initiate plan and implement interventions as ordered  - Provide and maintain a safe environment  - Consider seizure precautions  - Consider fall precautions  - Consider aspiration precautions  - Consider bleeding precautions  Outcome: Progressing     Problem: Activity Intolerance/Impaired Mobility  Goal: Mobility/activity is maintained at optimum level for patient  Description  Interventions:  - Assess and monitor patient  barriers to mobility and need for assistive/adaptive devices  - Assess patient's emotional response to limitations  - Collaborate with interdisciplinary team and initiate plans and interventions as ordered  - Encourage independent activity per ability   - Maintain proper body alignment  - Perform active/passive rom as tolerated/ordered    - Plan activities to conserve energy   - Turn patient as appropriate  Outcome: Progressing     Problem: Communication Impairment  Goal: Ability to express needs and understand communication  Description  Assess patient's communication skills and ability to understand information  Patient will demonstrate use of effective communication techniques, alternative methods of communication and understanding even if not able to speak  - Encourage communication and provide alternate methods of communication as needed  - Collaborate with case management/ for discharge needs  - Include patient/family/caregiver in decisions related to communication  Outcome: Progressing     Problem: Potential for Aspiration  Goal: Non-ventilated patient's risk of aspiration is minimized  Description  Assess and monitor vital signs, respiratory status, and labs (WBC)  Monitor for signs of aspiration (tachypnea, cough, rales, wheezing, cyanosis, fever)  - Assess and monitor patient's ability to swallow  - Place patient up in chair to eat if possible  - HOB up at 90 degrees to eat if unable to get patient up into chair   - Supervise patient during oral intake  - Instruct patient/ family to take small bites  - Instruct patient/ family to take small single sips when taking liquids  - Follow patient-specific strategies generated by speech pathologist   Outcome: Progressing     Problem: NEUROSENSORY - ADULT  Goal: Achieves stable or improved neurological status  Description  INTERVENTIONS  - Monitor and report changes in neurological status  - Monitor vital signs such as temperature, blood pressure, glucose, and any other labs ordered   - Initiate measures to prevent increased intracranial pressure  - Monitor for seizure activity and implement precautions if appropriate      Outcome: Progressing  Goal: Achieves maximal functionality and self care  Description  INTERVENTIONS  - Monitor swallowing and airway patency with patient fatigue and changes in neurological status  - Encourage and assist patient to increase activity and self care     - Encourage visually impaired, hearing impaired and aphasic patients to use assistive/communication devices  Outcome: Progressing Problem: CARDIOVASCULAR - ADULT  Goal: Absence of cardiac dysrhythmias or at baseline rhythm  Description  INTERVENTIONS:  - Continuous cardiac monitoring, vital signs, obtain 12 lead EKG if ordered  - Administer antiarrhythmic and heart rate control medications as ordered  - Monitor electrolytes and administer replacement therapy as ordered  Outcome: Progressing     Problem: GASTROINTESTINAL - ADULT  Goal: Minimal or absence of nausea and/or vomiting  Description  INTERVENTIONS:  - Administer IV fluids if ordered to ensure adequate hydration  - Maintain NPO status until nausea and vomiting are resolved  - Nasogastric tube if ordered  - Administer ordered antiemetic medications as needed  - Provide nonpharmacologic comfort measures as appropriate  - Advance diet as tolerated, if ordered  - Consider nutrition services referral to assist patient with adequate nutrition and appropriate food choices  Outcome: Progressing     Problem: METABOLIC, FLUID AND ELECTROLYTES - ADULT  Goal: Glucose maintained within target range  Description  INTERVENTIONS:  - Monitor Blood Glucose as ordered  - Assess for signs and symptoms of hyperglycemia and hypoglycemia  - Administer ordered medications to maintain glucose within target range  - Assess nutritional intake and initiate nutrition service referral as needed  Outcome: Progressing     Problem: PAIN - ADULT  Goal: Verbalizes/displays adequate comfort level or baseline comfort level  Description  Interventions:  - Encourage patient to monitor pain and request assistance  - Assess pain using appropriate pain scale  - Administer analgesics based on type and severity of pain and evaluate response  - Implement non-pharmacological measures as appropriate and evaluate response  - Consider cultural and social influences on pain and pain management  - Notify physician/advanced practitioner if interventions unsuccessful or patient reports new pain  Outcome: Progressing     Problem: DISCHARGE PLANNING  Goal: Discharge to home or other facility with appropriate resources  Description  INTERVENTIONS:  - Identify barriers to discharge w/patient and caregiver  - Arrange for needed discharge resources and transportation as appropriate  - Identify discharge learning needs (meds, wound care, etc )  - Arrange for interpretive services to assist at discharge as needed  - Refer to Case Management Department for coordinating discharge planning if the patient needs post-hospital services based on physician/advanced practitioner order or complex needs related to functional status, cognitive ability, or social support system  Outcome: Progressing     Problem: Knowledge Deficit  Goal: Patient/family/caregiver demonstrates understanding of disease process, treatment plan, medications, and discharge instructions  Description  Complete learning assessment and assess knowledge base  Interventions:  - Provide teaching at level of understanding  - Provide teaching via preferred learning methods  Outcome: Progressing     Problem: Nutrition/Hydration-ADULT  Goal: Nutrient/Hydration intake appropriate for improving, restoring or maintaining nutritional needs  Description  Monitor and assess patient's nutrition/hydration status for malnutrition  Collaborate with interdisciplinary team and initiate plan and interventions as ordered  Monitor patient's weight and dietary intake as ordered or per policy  Utilize nutrition screening tool and intervene as necessary  Determine patient's food preferences and provide high-protein, high-caloric foods as appropriate       INTERVENTIONS:  - Monitor oral intake, urinary output, labs, and treatment plans  - Assess nutrition and hydration status and recommend course of action  - Evaluate amount of meals eaten  - Assist patient with eating if necessary   - Allow adequate time for meals  - Recommend/ encourage appropriate diets, oral nutritional supplements, and vitamin/mineral supplements  - Order, calculate, and assess calorie counts as needed  - Recommend, monitor, and adjust tube feedings and TPN/PPN based on assessed needs  - Assess need for intravenous fluids  - Provide specific nutrition/hydration education as appropriate  - Include patient/family/caregiver in decisions related to nutrition  Outcome: Progressing     Problem: Prexisting or High Potential for Compromised Skin Integrity  Goal: Skin integrity is maintained or improved  Description  INTERVENTIONS:  - Identify patients at risk for skin breakdown  - Assess and monitor skin integrity  - Assess and monitor nutrition and hydration status  - Monitor labs   - Assess for incontinence   - Turn and reposition patient  - Assist with mobility/ambulation  - Relieve pressure over bony prominences  - Avoid friction and shearing  - Provide appropriate hygiene as needed including keeping skin clean and dry  - Evaluate need for skin moisturizer/barrier cream  - Collaborate with interdisciplinary team   - Patient/family teaching  - Consider wound care consult   Outcome: Progressing

## 2020-03-22 NOTE — PROGRESS NOTES
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  Stroke workup completed, including MRI brain, echo, and labs  MRI supporting the clinical findings  Echo with only diastolic dysfunction  A1c 6 1, Lipid panel with elevated       -continue on DAPT for 21 days to be completed on 4/10/20  Thereafter, no neurological indication for DAPT  However, if he has a cardiac indication, can continue on DAPT  Both ASA and plavix are listed as his prior home medications  -would increase his lipitor to 80mg for now, monitor for myopathy, and recheck his lipid panel in the future  Can lower dose as lipids come down  -further permissive HTN unlikely to be beneficial, would resume antihypertensives as tolerated  He does have L carotid atherosclerotic narrowing, so would bring his BP to normotension gradually  -SLP cleared for PO intake, now on regular consistency diet  No clinical suspicion for dysphagia    -PT/OT recommended acute  -no further neurology recommendations at this time  Please page with any changes to neurological status or any other issues we can help address  Subjective:   I saw and examined the patient at the bedside  Patient endorses some improvement in his L sided weakness  No new neurological complaints, including headache, visual disturbances, speech/language difficulty  He has been eating and reports no dysphagia  He walked with a walker with the assistance of PT yesterday  He denies new medical complaints as well: specifically denied having chest pain, shortness of breath, abdominal pain, diarrhea, constipation, or dysuria  Vitals reviewed, has been hypertensive, and afebrile  Otherwise, vitals stable  Vitals: Blood pressure (!) 189/90, pulse 81, temperature 98 2 °F (36 8 °C), temperature source Tympanic, resp  rate 18, height 5' 10" (1 778 m), weight 70 4 kg (155 lb 3 3 oz), SpO2 95 %  ,Body mass index is 22 27 kg/m²  Physical Exam:   GEN: NAD, resting in bed  CV: RRR, S1, S2, symmetric pulses  No carotid bruits  PULM: CTAB  GI;' soft, non-tender    NEURO:   Awake, alert, oriented, following commands, no L/R confusion or neglect  Normal naming, repetition, comprehension, and fluency  CN: full VF's, no papilledema, 3-2mm, symmetric, normal EOM with no nystagmus, L sided facial weakness and trace dysarthria  Tongue midline, palate symmetric  MOTOR: L arm and leg drift  L arm orbiting  R side is full strength  SENSORY: intact symmetrically to light touch  GAIT: deferred  COORDINATION: ataxia on the L FNF out of proportion to his paresis  REFLEXES: 3+ symmetric patellar, achilles 1+, toes bilaterally down  Lab, Imaging and other studies:   CBC:   Results from last 7 days   Lab Units 03/22/20  0450 03/21/20  0443 03/20/20  0635   WBC Thousand/uL 7 49 8 27 9 72   RBC Million/uL 4 20 4 07 4 31   HEMOGLOBIN g/dL 12 2 12 0 12 5   HEMATOCRIT % 37 4 36 9 38 8   MCV fL 89 91 90   PLATELETS Thousands/uL 214 210 226   , BMP/CMP:   Results from last 7 days   Lab Units 03/22/20  0450 03/21/20  0443 03/20/20  0635   SODIUM mmol/L 140 142 138   POTASSIUM mmol/L 3 3* 3 0* 3 5   CHLORIDE mmol/L 105 106 103   CO2 mmol/L 26 24 24   BUN mg/dL 9 10 14   CREATININE mg/dL 0 73 0 81 0 82   CALCIUM mg/dL 8 9 8 3 8 6   EGFR ml/min/1 73sq m 90 86 86     MRI brain images reviewed: diffusion restriction in the deep white matter, thalamocapsular region

## 2020-03-22 NOTE — SOCIAL WORK
CM met with the patient at bedside to do a general SW assessment and discuss d/c recommendations: The patient lives in a two story home, bed and bath upsatiars, SADE w/o rails  He lives alone  He is independent with adls and ambulation  He is retired   He drives  No hx of vna  No hx of str  He does not have a POA, however, reports his HCA would be his sister, Ro Deutsch  No D&A use  No MH history  PCP is Dr Sonny Waldrop with [facility]    Provided acute rehab list and discussed choices  Choice is [facility], referral sent  Requested PM&R consult to [facility]  Called the patients sister and discussed recommendations    A post acute care recommendation was made by your care team for Acute Rehab  Discussed [place] of Choice with patient  List of facilities given to patient via in person  patient aware the list is custom filtered for them by zip code location and that [facility] post acute providers are designated

## 2020-03-22 NOTE — PLAN OF CARE
Problem: Potential for Falls  Goal: Patient will remain free of falls  Description  INTERVENTIONS:  - Assess patient frequently for physical needs  -  Identify cognitive and physical deficits and behaviors that affect risk of falls  -  Bath fall precautions as indicated by assessment   - Educate patient/family on patient safety including physical limitations  - Instruct patient to call for assistance with activity based on assessment  - Modify environment to reduce risk of injury  - Consider OT/PT consult to assist with strengthening/mobility  Outcome: Progressing     Problem: Neurological Deficit  Goal: Neurological status is stable or improving  Description  Interventions:  - Monitor and assess patient's level of consciousness, motor function, sensory function, and level of assistance needed for ADLs  - Monitor and report changes from baseline  Collaborate with interdisciplinary team to initiate plan and implement interventions as ordered  - Provide and maintain a safe environment  - Consider seizure precautions  - Consider fall precautions  - Consider aspiration precautions  - Consider bleeding precautions  Outcome: Progressing     Problem: Activity Intolerance/Impaired Mobility  Goal: Mobility/activity is maintained at optimum level for patient  Description  Interventions:  - Assess and monitor patient  barriers to mobility and need for assistive/adaptive devices  - Assess patient's emotional response to limitations  - Collaborate with interdisciplinary team and initiate plans and interventions as ordered  - Encourage independent activity per ability   - Maintain proper body alignment  - Perform active/passive rom as tolerated/ordered    - Plan activities to conserve energy   - Turn patient as appropriate  Outcome: Progressing     Problem: Communication Impairment  Goal: Ability to express needs and understand communication  Description  Assess patient's communication skills and ability to understand information  Patient will demonstrate use of effective communication techniques, alternative methods of communication and understanding even if not able to speak  - Encourage communication and provide alternate methods of communication as needed  - Collaborate with case management/ for discharge needs  - Include patient/family/caregiver in decisions related to communication  Outcome: Progressing     Problem: Potential for Aspiration  Goal: Non-ventilated patient's risk of aspiration is minimized  Description  Assess and monitor vital signs, respiratory status, and labs (WBC)  Monitor for signs of aspiration (tachypnea, cough, rales, wheezing, cyanosis, fever)  - Assess and monitor patient's ability to swallow  - Place patient up in chair to eat if possible  - HOB up at 90 degrees to eat if unable to get patient up into chair   - Supervise patient during oral intake  - Instruct patient/ family to take small bites  - Instruct patient/ family to take small single sips when taking liquids  - Follow patient-specific strategies generated by speech pathologist   Outcome: Progressing     Problem: NEUROSENSORY - ADULT  Goal: Achieves stable or improved neurological status  Description  INTERVENTIONS  - Monitor and report changes in neurological status  - Monitor vital signs such as temperature, blood pressure, glucose, and any other labs ordered   - Initiate measures to prevent increased intracranial pressure  - Monitor for seizure activity and implement precautions if appropriate      Outcome: Progressing  Goal: Achieves maximal functionality and self care  Description  INTERVENTIONS  - Monitor swallowing and airway patency with patient fatigue and changes in neurological status  - Encourage and assist patient to increase activity and self care     - Encourage visually impaired, hearing impaired and aphasic patients to use assistive/communication devices  Outcome: Progressing Problem: CARDIOVASCULAR - ADULT  Goal: Absence of cardiac dysrhythmias or at baseline rhythm  Description  INTERVENTIONS:  - Continuous cardiac monitoring, vital signs, obtain 12 lead EKG if ordered  - Administer antiarrhythmic and heart rate control medications as ordered  - Monitor electrolytes and administer replacement therapy as ordered  Outcome: Progressing     Problem: GASTROINTESTINAL - ADULT  Goal: Minimal or absence of nausea and/or vomiting  Description  INTERVENTIONS:  - Administer IV fluids if ordered to ensure adequate hydration  - Maintain NPO status until nausea and vomiting are resolved  - Nasogastric tube if ordered  - Administer ordered antiemetic medications as needed  - Provide nonpharmacologic comfort measures as appropriate  - Advance diet as tolerated, if ordered  - Consider nutrition services referral to assist patient with adequate nutrition and appropriate food choices  Outcome: Progressing     Problem: METABOLIC, FLUID AND ELECTROLYTES - ADULT  Goal: Glucose maintained within target range  Description  INTERVENTIONS:  - Monitor Blood Glucose as ordered  - Assess for signs and symptoms of hyperglycemia and hypoglycemia  - Administer ordered medications to maintain glucose within target range  - Assess nutritional intake and initiate nutrition service referral as needed  Outcome: Progressing     Problem: PAIN - ADULT  Goal: Verbalizes/displays adequate comfort level or baseline comfort level  Description  Interventions:  - Encourage patient to monitor pain and request assistance  - Assess pain using appropriate pain scale  - Administer analgesics based on type and severity of pain and evaluate response  - Implement non-pharmacological measures as appropriate and evaluate response  - Consider cultural and social influences on pain and pain management  - Notify physician/advanced practitioner if interventions unsuccessful or patient reports new pain  Outcome: Progressing     Problem: DISCHARGE PLANNING  Goal: Discharge to home or other facility with appropriate resources  Description  INTERVENTIONS:  - Identify barriers to discharge w/patient and caregiver  - Arrange for needed discharge resources and transportation as appropriate  - Identify discharge learning needs (meds, wound care, etc )  - Arrange for interpretive services to assist at discharge as needed  - Refer to Case Management Department for coordinating discharge planning if the patient needs post-hospital services based on physician/advanced practitioner order or complex needs related to functional status, cognitive ability, or social support system  Outcome: Progressing     Problem: Knowledge Deficit  Goal: Patient/family/caregiver demonstrates understanding of disease process, treatment plan, medications, and discharge instructions  Description  Complete learning assessment and assess knowledge base  Interventions:  - Provide teaching at level of understanding  - Provide teaching via preferred learning methods  Outcome: Progressing     Problem: Nutrition/Hydration-ADULT  Goal: Nutrient/Hydration intake appropriate for improving, restoring or maintaining nutritional needs  Description  Monitor and assess patient's nutrition/hydration status for malnutrition  Collaborate with interdisciplinary team and initiate plan and interventions as ordered  Monitor patient's weight and dietary intake as ordered or per policy  Utilize nutrition screening tool and intervene as necessary  Determine patient's food preferences and provide high-protein, high-caloric foods as appropriate       INTERVENTIONS:  - Monitor oral intake, urinary output, labs, and treatment plans  - Assess nutrition and hydration status and recommend course of action  - Evaluate amount of meals eaten  - Assist patient with eating if necessary   - Allow adequate time for meals  - Recommend/ encourage appropriate diets, oral nutritional supplements, and vitamin/mineral supplements  - Order, calculate, and assess calorie counts as needed  - Recommend, monitor, and adjust tube feedings and TPN/PPN based on assessed needs  - Assess need for intravenous fluids  - Provide specific nutrition/hydration education as appropriate  - Include patient/family/caregiver in decisions related to nutrition  Outcome: Progressing     Problem: Prexisting or High Potential for Compromised Skin Integrity  Goal: Skin integrity is maintained or improved  Description  INTERVENTIONS:  - Identify patients at risk for skin breakdown  - Assess and monitor skin integrity  - Assess and monitor nutrition and hydration status  - Monitor labs   - Assess for incontinence   - Turn and reposition patient  - Assist with mobility/ambulation  - Relieve pressure over bony prominences  - Avoid friction and shearing  - Provide appropriate hygiene as needed including keeping skin clean and dry  - Evaluate need for skin moisturizer/barrier cream  - Collaborate with interdisciplinary team   - Patient/family teaching  - Consider wound care consult   Outcome: Progressing

## 2020-03-22 NOTE — PROGRESS NOTES
Progress Note - [place] Holiday 1945, 66 y o  male MRN: [id number]    Unit/Bed#: E4 -01 Encounter: [id number]    Primary Care Provider: No primary care provider on file  Date and time admitted to hospital: 3/20/2020 12:15 AM        * Stroke [facility])  Assessment & Plan  Patient presenting with Left-sided upper and lower extremity weakness which he suddenly experienced when repairing pipe in the basement followed by a fall backwards, denies head trauma  Extensive intracranial disease demonstrated on CTA head - showing severe focal stenosis of proximal left M Segment and severe stenosis of the distal right V4 segment  MRI brain revealed "Acute right thalamic lacunar infarction" not demonstrated on CT head   Patient presented out of window for tPA  Continue ASA/plavix as well as high-intensity statin  Echo unremarkable, preserved EF, grade 1 diastolic dysfunction   Neurology recommendations appreciated  Resume home BP medications  Patient to be discharged to [facility] vs Acute rehab - PMR consult placed, input appreicated    Hypokalemia  Assessment & Plan  Oral replacement  Monitor BMP and Mg  Diet advanced     Elevated troponin  Assessment & Plan  Non-MI elevation due to stroke/accelerated HTN   No chest pain       HTN (hypertension)  Assessment & Plan  Patient initially presented with accelerated hypertension - antihypertensives were held for permissive hypertension  Resume home regimen, BP markedly elevated and may need an additional medication        VTE Pharmacologic Prophylaxis:   Pharmacologic: Enoxaparin (Lovenox)  Mechanical VTE Prophylaxis in Place: Yes    Patient Centered Rounds: I have performed bedside rounds with nursing staff today  Discussions with Specialists or Other Care Team Provider: Neurology, Case management    Education and Discussions with Family / Patient: sister    Time Spent for Care: 30 minutes    More than 50% of total time spent on counseling and coordination of care as described above     Current Length of Stay: 2 day(s)    Current Patient Status: Inpatient   Certification Statement: The patient will continue to require additional inpatient hospital stay due to need for close monitoring, therapies/rehab placement    Discharge Plan: TBD    Code Status: Level 1 - Full Code      Subjective:   Patient seen and examined  He continues to have discoordination in his left arm and leg, no other complaints  Reports good motivation and mild improvement with PT/OT  No o/n events  Objective:     Vitals:   Temp (24hrs), Av 4 °F (36 9 °C), Min:97 7 °F (36 5 °C), Max:99 3 °F (37 4 °C)    Temp:  [97 7 °F (36 5 °C)-99 3 °F (37 4 °C)] 98 2 °F (36 8 °C)  HR:  [66-84] 81  Resp:  [18] 18  BP: (111-232)/() 189/90  SpO2:  [93 %-97 %] 95 %  Body mass index is 22 27 kg/m²  Input and Output Summary (last 24 hours): Intake/Output Summary (Last 24 hours) at 3/22/2020 1053  Last data filed at 3/22/2020 0856  Gross per 24 hour   Intake 480 ml   Output 1425 ml   Net -945 ml       Physical Exam:     Physical Exam   Constitutional: He is oriented to person, place, and time  No distress  HENT:   Head: Normocephalic and atraumatic  Eyes: Conjunctivae are normal    Neck: No JVD present  Cardiovascular: Normal rate and regular rhythm  No murmur heard  Pulmonary/Chest: Effort normal  No respiratory distress  He has no wheezes  He has no rales  Abdominal: Soft  He exhibits no distension  There is no tenderness  There is no guarding  Musculoskeletal: He exhibits no edema  Neurological: He is alert and oriented to person, place, and time  Coordination abnormal    LUE/LLE ataxia    Skin: Skin is warm and dry  Psychiatric: He has a normal mood and affect         Additional Data:     Labs:    Results from last 7 days   Lab Units 20  0450   WBC Thousand/uL 7 49   HEMOGLOBIN g/dL 12 2   HEMATOCRIT % 37 4   PLATELETS Thousands/uL 214   NEUTROS PCT % 59   LYMPHS PCT % 26   MONOS PCT % 13*   EOS PCT % 2     Results from last 7 days   Lab Units 03/22/20  0450   SODIUM mmol/L 140   POTASSIUM mmol/L 3 3*   CHLORIDE mmol/L 105   CO2 mmol/L 26   BUN mg/dL 9   CREATININE mg/dL 0 73   ANION GAP mmol/L 9   CALCIUM mg/dL 8 9   GLUCOSE RANDOM mg/dL 112     Results from last 7 days   Lab Units 03/20/20  0026   INR  1 02     Results from last 7 days   Lab Units 03/20/20  0057   POC GLUCOSE mg/dl 137     Results from last 7 days   Lab Units 03/20/20  0635   HEMOGLOBIN A1C % 6 1*               * I Have Reviewed All Lab Data Listed Above  * Additional Pertinent Lab Tests Reviewed: Ro [address] Admission Reviewed    Imaging:    Imaging Reports Reviewed Today Include: no new today     Recent Cultures (last 7 days):           Last 24 Hours Medication List:     Current Facility-Administered Medications:  acetaminophen 650 mg Oral Q4H PRN Maris Carranza PA-C   aspirin 81 mg Oral Daily Seema Ford PA-C   atorvastatin 80 mg Oral Daily With Carmen Tristan MD   calcium carbonate 1,000 mg Oral Daily PRN Maris Carranza PA-C   clopidogrel 75 mg Oral Daily Seema Ford PA-C   enoxaparin 40 mg Subcutaneous Daily Maris Carranza PA-C   hydrALAZINE 10 mg Intravenous Q4H PRN Meaghan Mane MD   iohexol 85 mL Intravenous Once in imaging Phineas Settle , DO   Labetalol HCl 10 mg Intravenous Q4H PRN Cherelle Davis MD   lisinopril 40 mg Oral Daily Cherelle Davis MD   metoprolol succinate 100 mg Oral Daily Cherelle Davis MD   ondansetron 4 mg Intravenous Q6H PRN Maris Carranza PA-C   potassium chloride 40 mEq Oral BID Cherelle Davis MD        Today, Patient Was Seen By: Perry Good MD    ** Please Note: Dictation voice to text software may have been used in the creation of this document   **

## 2020-03-22 NOTE — ASSESSMENT & PLAN NOTE
Patient initially presented with accelerated hypertension - antihypertensives were held for permissive hypertension  Resume home regimen, BP markedly elevated and may need an additional medication

## 2020-03-23 LAB
ANION GAP SERPL CALCULATED.3IONS-SCNC: 10 MMOL/L (ref 4–13)
BASOPHILS # BLD AUTO: 0.05 THOUSANDS/ΜL (ref 0–0.1)
BASOPHILS NFR BLD AUTO: 1 % (ref 0–1)
BUN SERPL-MCNC: 9 MG/DL (ref 5–25)
CALCIUM SERPL-MCNC: 9.3 MG/DL (ref 8.3–10.1)
CHLORIDE SERPL-SCNC: 103 MMOL/L (ref 100–108)
CO2 SERPL-SCNC: 26 MMOL/L (ref 21–32)
CREAT SERPL-MCNC: 0.83 MG/DL (ref 0.6–1.3)
EOSINOPHIL # BLD AUTO: 0.18 THOUSAND/ΜL (ref 0–0.61)
EOSINOPHIL NFR BLD AUTO: 2 % (ref 0–6)
ERYTHROCYTE [DISTWIDTH] IN BLOOD BY AUTOMATED COUNT: 13.4 % (ref 11.6–15.1)
GFR SERPL CREATININE-BSD FRML MDRD: 85 ML/MIN/1.73SQ M
GLUCOSE SERPL-MCNC: 119 MG/DL (ref 65–140)
HCT VFR BLD AUTO: 40.2 % (ref 36.5–49.3)
HGB BLD-MCNC: 12.9 G/DL (ref 12–17)
IMM GRANULOCYTES # BLD AUTO: 0.02 THOUSAND/UL (ref 0–0.2)
IMM GRANULOCYTES NFR BLD AUTO: 0 % (ref 0–2)
LYMPHOCYTES # BLD AUTO: 2.03 THOUSANDS/ΜL (ref 0.6–4.47)
LYMPHOCYTES NFR BLD AUTO: 25 % (ref 14–44)
MAGNESIUM SERPL-MCNC: 2.1 MG/DL (ref 1.6–2.6)
MCH RBC QN AUTO: 29 PG (ref 26.8–34.3)
MCHC RBC AUTO-ENTMCNC: 32.1 G/DL (ref 31.4–37.4)
MCV RBC AUTO: 90 FL (ref 82–98)
MONOCYTES # BLD AUTO: 0.94 THOUSAND/ΜL (ref 0.17–1.22)
MONOCYTES NFR BLD AUTO: 12 % (ref 4–12)
NEUTROPHILS # BLD AUTO: 4.97 THOUSANDS/ΜL (ref 1.85–7.62)
NEUTS SEG NFR BLD AUTO: 60 % (ref 43–75)
NRBC BLD AUTO-RTO: 0 /100 WBCS
PLATELET # BLD AUTO: 244 THOUSANDS/UL (ref 149–390)
PMV BLD AUTO: 10.2 FL (ref 8.9–12.7)
POTASSIUM SERPL-SCNC: 4 MMOL/L (ref 3.5–5.3)
RBC # BLD AUTO: 4.45 MILLION/UL (ref 3.88–5.62)
SODIUM SERPL-SCNC: 139 MMOL/L (ref 136–145)
WBC # BLD AUTO: 8.19 THOUSAND/UL (ref 4.31–10.16)

## 2020-03-23 PROCEDURE — 97530 THERAPEUTIC ACTIVITIES: CPT

## 2020-03-23 PROCEDURE — 97110 THERAPEUTIC EXERCISES: CPT

## 2020-03-23 PROCEDURE — 92526 ORAL FUNCTION THERAPY: CPT

## 2020-03-23 PROCEDURE — 83735 ASSAY OF MAGNESIUM: CPT | Performed by: FAMILY MEDICINE

## 2020-03-23 PROCEDURE — 85025 COMPLETE CBC W/AUTO DIFF WBC: CPT | Performed by: FAMILY MEDICINE

## 2020-03-23 PROCEDURE — 99232 SBSQ HOSP IP/OBS MODERATE 35: CPT | Performed by: INTERNAL MEDICINE

## 2020-03-23 PROCEDURE — 80048 BASIC METABOLIC PNL TOTAL CA: CPT | Performed by: FAMILY MEDICINE

## 2020-03-23 PROCEDURE — 97116 GAIT TRAINING THERAPY: CPT

## 2020-03-23 PROCEDURE — 99232 SBSQ HOSP IP/OBS MODERATE 35: CPT | Performed by: PHYSICAL MEDICINE & REHABILITATION

## 2020-03-23 PROCEDURE — 97535 SELF CARE MNGMENT TRAINING: CPT

## 2020-03-23 PROCEDURE — 97112 NEUROMUSCULAR REEDUCATION: CPT

## 2020-03-23 PROCEDURE — NC001 PR NO CHARGE

## 2020-03-23 RX ADMIN — METOPROLOL SUCCINATE 100 MG: 50 TABLET, EXTENDED RELEASE ORAL at 08:24

## 2020-03-23 RX ADMIN — ATORVASTATIN CALCIUM 80 MG: 80 TABLET, FILM COATED ORAL at 16:55

## 2020-03-23 RX ADMIN — ASPIRIN 81 MG: 81 TABLET, COATED ORAL at 08:24

## 2020-03-23 RX ADMIN — ENOXAPARIN SODIUM 40 MG: 40 INJECTION SUBCUTANEOUS at 08:25

## 2020-03-23 RX ADMIN — LISINOPRIL 40 MG: 20 TABLET ORAL at 08:24

## 2020-03-23 RX ADMIN — CLOPIDOGREL BISULFATE 75 MG: 75 TABLET ORAL at 08:24

## 2020-03-23 NOTE — PLAN OF CARE
Problem: OCCUPATIONAL THERAPY ADULT  Goal: Performs self-care activities at highest level of function for planned discharge setting  See evaluation for individualized goals  Description  Treatment Interventions: ADL retraining, UE strengthening/ROM, Functional transfer training, Endurance training, Cognitive reorientation, Patient/family training, Equipment evaluation/education, Compensatory technique education, Energy conservation, Activityengagement          See flowsheet documentation for full assessment, interventions and recommendations  Outcome: Progressing  Note:   Limitation: Decreased ADL status, Decreased UE strength, Decreased Safe judgement during ADL, Decreased cognition, Decreased endurance, Decreased high-level ADLs, Decreased self-care trans, Decreased fine motor control, Decreased UE ROM, Decreased sensation, Non-func L UE  Prognosis: Good  Assessment: Pt seen for skilled OT session focused on ADLs, functional transfers and mobility, bed mobility, UE exercises and coordination tasks  Pt w/ MIN assist supine>sit bed mobility which is improvement since initial evaluation  Pt w/ MOD assist x2 sit>stand from bed w/ VCs for safety as pt impulsive  Pt w/ MOD assist x2 functional mobility w/ RW w/ lean to left side and cues for safety w/ L LE positioning during mobility  Pt w/ MOD assist x2 stand>sit transfer to toilet w/ grab bar use and cues for safety and positioning  Pt w/ MOD assist x2 sit>stand from [facility] over toilet w/ grab bar use  Pt w/ MAX assist toileting hygiene w/ mod assist steadying in stance w/ RW support  Pt w/ MOD assist x2 functional mobility w/ RW w/ left sided lean and fatigue and MOD assist x2 stand>sit transfer to recliner w/ cues for safety and controlled descent on left side  Pt w/ MIN assist for grooming task while seated  Pt w/ improvement in sitting balance since initially evaluation w/ decreased lean to left side   Pt w/ functional dynamic sitting balance w/ reaching w/ left UE in a variety of plane and Fair - sitting balance and increased control of reaching w/ L UE w/ decreased grasp control onto the objects reaching  Pt completed b/l UE exercises seen above and educated on speed of movement and control of L UE  Pt completed towel slides on Left UE to increase ROM and coordination  Pt continues to be limited due to flat affect, impulsive and decreased safety awareness, fall risk, L side impaired coordination, impaired balance, increased processing time all causing a decline in ADLs, functional transfers and mobility  Recommend STR when medically stable  Will continue to follow to address OT POC       OT Discharge Recommendation: Short Term Rehab  OT - OK to Discharge: (to rehab when medically stable)

## 2020-03-23 NOTE — PLAN OF CARE
Problem: Potential for Falls  Goal: Patient will remain free of falls  Description  INTERVENTIONS:  - Assess patient frequently for physical needs  -  Identify cognitive and physical deficits and behaviors that affect risk of falls  -  [place] fall precautions as indicated by assessment   - Educate patient/family on patient safety including physical limitations  - Instruct patient to call for assistance with activity based on assessment  - Modify environment to reduce risk of injury  - Consider OT/PT consult to assist with strengthening/mobility  Outcome: Progressing     Problem: Neurological Deficit  Goal: Neurological status is stable or improving  Description  Interventions:  - Monitor and assess patient's level of consciousness, motor function, sensory function, and level of assistance needed for ADLs  - Monitor and report changes from baseline  Collaborate with interdisciplinary team to initiate plan and implement interventions as ordered  - Provide and maintain a safe environment  - Consider seizure precautions  - Consider fall precautions  - Consider aspiration precautions  - Consider bleeding precautions  Outcome: Progressing     Problem: Activity Intolerance/Impaired Mobility  Goal: Mobility/activity is maintained at optimum level for patient  Description  Interventions:  - Assess and monitor patient  barriers to mobility and need for assistive/adaptive devices  - Assess patient's emotional response to limitations  - Collaborate with interdisciplinary team and initiate plans and interventions as ordered  - Encourage independent activity per ability   - Maintain proper body alignment  - Perform active/passive rom as tolerated/ordered    - Plan activities to conserve energy   - Turn patient as appropriate  Outcome: Progressing     Problem: Communication Impairment  Goal: Ability to express needs and understand communication  Description  Assess patient's communication skills and ability to understand information  Patient will demonstrate use of effective communication techniques, alternative methods of communication and understanding even if not able to speak  - Encourage communication and provide alternate methods of communication as needed  - Collaborate with case management/ for discharge needs  - Include patient/family/caregiver in decisions related to communication  Outcome: Progressing     Problem: Potential for Aspiration  Goal: Non-ventilated patient's risk of aspiration is minimized  Description  Assess and monitor vital signs, respiratory status, and labs (WBC)  Monitor for signs of aspiration (tachypnea, cough, rales, wheezing, cyanosis, fever)  - Assess and monitor patient's ability to swallow  - Place patient up in chair to eat if possible  - HOB up at 90 degrees to eat if unable to get patient up into chair   - Supervise patient during oral intake  - Instruct patient/ family to take small bites  - Instruct patient/ family to take small single sips when taking liquids  - Follow patient-specific strategies generated by speech pathologist   Outcome: Progressing     Problem: NEUROSENSORY - ADULT  Goal: Achieves stable or improved neurological status  Description  INTERVENTIONS  - Monitor and report changes in neurological status  - Monitor vital signs such as temperature, blood pressure, glucose, and any other labs ordered   - Initiate measures to prevent increased intracranial pressure  - Monitor for seizure activity and implement precautions if appropriate      Outcome: Progressing  Goal: Achieves maximal functionality and self care  Description  INTERVENTIONS  - Monitor swallowing and airway patency with patient fatigue and changes in neurological status  - Encourage and assist patient to increase activity and self care     - Encourage visually impaired, hearing impaired and aphasic patients to use assistive/communication devices  Outcome: Progressing Problem: CARDIOVASCULAR - ADULT  Goal: Absence of cardiac dysrhythmias or at baseline rhythm  Description  INTERVENTIONS:  - Continuous cardiac monitoring, vital signs, obtain 12 lead EKG if ordered  - Administer antiarrhythmic and heart rate control medications as ordered  - Monitor electrolytes and administer replacement therapy as ordered  Outcome: Progressing     Problem: GASTROINTESTINAL - ADULT  Goal: Minimal or absence of nausea and/or vomiting  Description  INTERVENTIONS:  - Administer IV fluids if ordered to ensure adequate hydration  - Maintain NPO status until nausea and vomiting are resolved  - Nasogastric tube if ordered  - Administer ordered antiemetic medications as needed  - Provide nonpharmacologic comfort measures as appropriate  - Advance diet as tolerated, if ordered  - Consider nutrition services referral to assist patient with adequate nutrition and appropriate food choices  Outcome: Progressing     Problem: METABOLIC, FLUID AND ELECTROLYTES - ADULT  Goal: Glucose maintained within target range  Description  INTERVENTIONS:  - Monitor Blood Glucose as ordered  - Assess for signs and symptoms of hyperglycemia and hypoglycemia  - Administer ordered medications to maintain glucose within target range  - Assess nutritional intake and initiate nutrition service referral as needed  Outcome: Progressing     Problem: PAIN - ADULT  Goal: Verbalizes/displays adequate comfort level or baseline comfort level  Description  Interventions:  - Encourage patient to monitor pain and request assistance  - Assess pain using appropriate pain scale  - Administer analgesics based on type and severity of pain and evaluate response  - Implement non-pharmacological measures as appropriate and evaluate response  - Consider cultural and social influences on pain and pain management  - Notify physician/advanced practitioner if interventions unsuccessful or patient reports new pain  Outcome: Progressing     Problem: DISCHARGE PLANNING  Goal: Discharge to home or other facility with appropriate resources  Description  INTERVENTIONS:  - Identify barriers to discharge w/patient and caregiver  - Arrange for needed discharge resources and transportation as appropriate  - Identify discharge learning needs (meds, wound care, etc )  - Arrange for interpretive services to assist at discharge as needed  - Refer to Case Management Department for coordinating discharge planning if the patient needs post-hospital services based on physician/advanced practitioner order or complex needs related to functional status, cognitive ability, or social support system  Outcome: Progressing     Problem: Knowledge Deficit  Goal: Patient/family/caregiver demonstrates understanding of disease process, treatment plan, medications, and discharge instructions  Description  Complete learning assessment and assess knowledge base  Interventions:  - Provide teaching at level of understanding  - Provide teaching via preferred learning methods  Outcome: Progressing     Problem: Nutrition/Hydration-ADULT  Goal: Nutrient/Hydration intake appropriate for improving, restoring or maintaining nutritional needs  Description  Monitor and assess patient's nutrition/hydration status for malnutrition  Collaborate with interdisciplinary team and initiate plan and interventions as ordered  Monitor patient's weight and dietary intake as ordered or per policy  Utilize nutrition screening tool and intervene as necessary  Determine patient's food preferences and provide high-protein, high-caloric foods as appropriate       INTERVENTIONS:  - Monitor oral intake, urinary output, labs, and treatment plans  - Assess nutrition and hydration status and recommend course of action  - Evaluate amount of meals eaten  - Assist patient with eating if necessary   - Allow adequate time for meals  - Recommend/ encourage appropriate diets, oral nutritional supplements, and vitamin/mineral supplements  - Order, calculate, and assess calorie counts as needed  - Recommend, monitor, and adjust tube feedings and TPN/PPN based on assessed needs  - Assess need for intravenous fluids  - Provide specific nutrition/hydration education as appropriate  - Include patient/family/caregiver in decisions related to nutrition  Outcome: Progressing     Problem: Prexisting or High Potential for Compromised Skin Integrity  Goal: Skin integrity is maintained or improved  Description  INTERVENTIONS:  - Identify patients at risk for skin breakdown  - Assess and monitor skin integrity  - Assess and monitor nutrition and hydration status  - Monitor labs   - Assess for incontinence   - Turn and reposition patient  - Assist with mobility/ambulation  - Relieve pressure over bony prominences  - Avoid friction and shearing  - Provide appropriate hygiene as needed including keeping skin clean and dry  - Evaluate need for skin moisturizer/barrier cream  - Collaborate with interdisciplinary team   - Patient/family teaching  - Consider wound care consult   Outcome: Progressing

## 2020-03-23 NOTE — OCCUPATIONAL THERAPY NOTE
OccupationalTherapy Progress Note     Patient Name: Sandra Fish  NWONC'H Date: 3/23/2020  Problem List  Principal Problem:    Acute lacunar stroke [facility])  Active Problems:    HTN (hypertension)    Elevated troponin    Hypokalemia            03/23/20 1420   Restrictions/Precautions   Weight Bearing Precautions Per Order No   Other Precautions Chair Alarm; Bed Alarm; Fall Risk;Suicidal;Multiple lines   Pain Assessment   Pain Assessment Tool Pain Assessment not indicated - pt denies pain   Pain Score No Pain   ADL   Where Assessed Chair   Grooming Assistance 4  Minimal Assistance   Grooming Deficit Setup;Verbal cueing;Supervision/safety; Increased time to complete;Wash/dry face; Wash/dry hands   LB Dressing Assistance 3  Moderate Assistance   LB Dressing Deficit Setup;Supervision/safety;Verbal cueing; Increased time to complete; Don/doff R sock; Don/doff L sock   LB Dressing Comments impaired functional reach   [address]  2  Maximal Assistance   Toileting Deficit Setup;Steadying;Verbal cueing;Supervison/safety; Increased time to complete;Clothing management up;Clothing management down;Perineal hygiene   Toileting Comments MOD assist x1 steadying in stance w/ assist of 2nd for hygiene cleanup   Functional Standing Tolerance   Time 1:00min    Activity w/ MOD assist steadying assist during ADL tasks   Bed Mobility   Supine to Sit 4  Minimal assistance   Additional items Assist x 1;HOB elevated; Bedrails; Increased time required;LE management   Additional Comments increased time to complete and cues for safety, improved sitting balance   Transfers   Sit to Stand 3  Moderate assistance   Additional items Assist x 2; Increased time required;Verbal cues;Armrests; Impulsive   Stand to Sit 3  Moderate assistance   Additional items Assist x 2; Increased time required;Verbal cues;Armrests  (controlled descent and left sided lean )   Toilet transfer 3  Moderate assistance   Additional items Assist x 2; Increased time required;Standard toilet;Commode  (BSC over toilet and cues for hand placement and grab bar use)   Additional Comments VCs for hand placement and positioning, impulsive   Functional Mobility   Functional Mobility 3  Moderate assistance   Additional Comments assist x2 w/ cues for sequencing and impaired insight and safety awareness, unsteadiness noted   Additional items Rolling walker   Therapeutic Exercise - ROM   UE-ROM Yes   ROM- Right Upper Extremities   R Shoulder AROM; Flexion;ABduction; Extension   R Elbow AROM;Elbow flexion;Elbow extension   R Weight/Reps/Sets 2 sets x 10 reps   RUE ROM Comment tolerated well   ROM - Left Upper Extremities    L Shoulder AROM; Flexion; Extension;Prolonged stretch  (w/ end range stretching)   L Elbow AROM;Elbow extension;Elbow flexion;Prolonged stretch  (w/ cues for positioning)   L Weight/Reps/Sets 2 sets x 10 reps, w/ cues for controlled movement   LUE ROM Comment w/ towel slide w/ left UE and controlled movement, increased time for extension of digits   Cognition   Overall Cognitive Status Impaired   Arousal/Participation Responsive; Cooperative; Alert   Attention Attends with cues to redirect   Orientation Level Oriented to person;Oriented to place;Oriented to time;Oriented to situation;Oriented X4   Memory Decreased short term memory;Decreased recall of precautions   Following Commands Follows one step commands with increased time or repetition   Comments pt impulsive, cues for safety, increased processing time, flat affect, pt stating "Why did this happen to me", requiring emotional support   Additional Activities   Additional Activities   (education on safety and positioning)   Additional Activities Comments pt receptive   Activity Tolerance   Activity Tolerance Patient limited by fatigue   Medical Staff Made Aware appropriate to see per RNJulio César   Assessment   Assessment Pt seen for skilled OT session focused on ADLs, functional transfers and mobility, bed mobility, UE exercises and coordination tasks  Pt w/ MIN assist supine>sit bed mobility which is improvement since initial evaluation  Pt w/ MOD assist x2 sit>stand from bed w/ VCs for safety as pt impulsive  Pt w/ MOD assist x2 functional mobility w/ RW w/ lean to left side and cues for safety w/ L LE positioning during mobility  Pt w/ MOD assist x2 stand>sit transfer to toilet w/ grab bar use and cues for safety and positioning  Pt w/ MOD assist x2 sit>stand from [facility] over toilet w/ grab bar use  Pt w/ MAX assist toileting hygiene w/ mod assist steadying in stance w/ RW support  Pt w/ MOD assist x2 functional mobility w/ RW w/ left sided lean and fatigue and MOD assist x2 stand>sit transfer to recliner w/ cues for safety and controlled descent on left side  Pt w/ MIN assist for grooming task while seated  Pt w/ improvement in sitting balance since initially evaluation w/ decreased lean to left side  Pt w/ functional dynamic sitting balance w/ reaching w/ left UE in a variety of plane and Fair - sitting balance and increased control of reaching w/ L UE w/ decreased grasp control onto the objects reaching  Pt completed b/l UE exercises seen above and educated on speed of movement and control of L UE  Pt completed towel slides on Left UE to increase ROM and coordination  Pt continues to be limited due to flat affect, impulsive and decreased safety awareness, fall risk, L side impaired coordination, impaired balance, increased processing time all causing a decline in ADLs, functional transfers and mobility  Recommend STR when medically stable  Will continue to follow to address OT POC  Plan   Treatment Interventions ADL retraining;Functional transfer training;UE strengthening/ROM; Cognitive reorientation; Endurance training;Patient/family training;Equipment evaluation/education; Compensatory technique education; Energy conservation; Activityengagement   Goal Expiration Date 04/03/20   OT Treatment Day 3   OT Frequency 3-5x/wk Recommendation   OT Discharge Recommendation Short Term Rehab   OT - OK to Discharge   (to rehab when medically stable)   Barthel Index   Feeding 5   Bathing 0   Grooming Score 0   Dressing Score 5   Bladder Score 10   Bowels Score 10   Toilet Use Score 5   Transfers (Bed/Chair) Score 5   Mobility (Level Surface) Score 0   Stairs Score 0   Barthel Index Score 40   Modified [place] Scale   Modified Debbie Scale 4     Documentation completed by: Beryl Walsh MS, OTR/L

## 2020-03-23 NOTE — PLAN OF CARE
Problem: PHYSICAL THERAPY ADULT  Goal: Performs mobility at highest level of function for planned discharge setting  See evaluation for individualized goals  Description  Treatment/Interventions: Functional transfer training, LE strengthening/ROM, Therapeutic exercise, Elevations, Endurance training, Patient/family training, Equipment eval/education, Bed mobility, Gait training, Compensatory technique education, Continued evaluation, OT  Equipment Recommended: Polly Longo       See flowsheet documentation for full assessment, interventions and recommendations  Outcome: Progressing  Note:   Prognosis: Good  Problem List: Decreased strength, Decreased endurance, Impaired balance, Decreased mobility, Decreased coordination, Decreased safety awareness, Impaired judgement, Impaired sensation, Impaired tone, Decreased cognition  Assessment: Jose Martin Smoker was seen for a follow up session  Pt demonstrating mild improvements in L sided control and upright balance  Able to progress bed mobility to min Ax1 for safety and trunk support, transf min-mod Ax2 w increased physical support for sit>stand, A for controlled descent stand>sit, frequent cuing for hand placement and safety including placement of RW  Pt able to progress amb to mod Ax2 w RW 20'x2, 3'x1  Noted significant gait deviations impacting safety including LLE ataxia, poor sequencing w RW, poor UE support, fluctuates in BOBBY due to poor motor control of LLE/inconsistent step length on L  Also noted degradation of posture w fatigue (increased L lean)  Pt w improved ambulation during second gait trial w cues for pacing, proper step length and sequencing w RW  Tolerated LE therex well, w rest breaks prn and cues for proper pacing (controlled movements)  Performed mini squats w RW for support, tactile cues to prevent L knee buckling  End of session pt seated in bedside chair, chair alarm engaged and OT present   Would benefit from STR at d/c given fall risk, decline from baseline fxn, social and environmental barriers, to improve mobility and optimize quality of life  Barriers to Discharge: Inaccessible home environment, Decreased caregiver support  Barriers to Discharge Comments: +steps, lives alone  Recommendation: Short-term skilled PT     PT - OK to Discharge: Yes    See flowsheet documentation for full assessment

## 2020-03-23 NOTE — CASE MANAGEMENT
Cm reviewed pt care coordination rounds with Do Silverio Anaya  Pt is medically cleared for d/c  [facility] is following, asked for updated PT/OT note to start Ins auth  Cm notify PT/OT   Will f/u with [facility] for auth approval

## 2020-03-23 NOTE — ASSESSMENT & PLAN NOTE
Patient presenting with Left-sided upper and lower extremity weakness which he suddenly experienced when repairing pipe in the basement followed by a fall backwards, denies head trauma  Extensive intracranial disease demonstrated on CTA head - showing severe focal stenosis of proximal left M Segment and severe stenosis of the distal right V4 segment    MRI brain revealed "Acute right thalamic lacunar infarction" not demonstrated on CT head   Patient presented out of window for tPA  Continue ASA/plavix as well as high-intensity statin  Echo unremarkable, preserved EF, grade 1 diastolic dysfunction   Neurology recommendations appreciated  Resume home BP medications  Patient to be discharged to [facility] vs Acute rehab - PMR consult placed, input appreicated

## 2020-03-23 NOTE — PROGRESS NOTES
PHYSICAL MEDICINE AND REHABILITATION   PREADMISSION ASSESSMENT     Projected [facility] Diagnoses:  Impairment of mobility, safety and Activities of Daily Living (ADLs) due to Stroke:  01 1  Left Body Involvement (Right Brain)  Etiologic: Right Thalamic Lacunar Infarct  Date of Onset: 3/20/2020   Date of surgery: N/A    PATIENT INFORMATION  Name: Aden Boggs Phone #: [phone] (home)   [facility] [address]  YOB: 1945 Age: 66 y o  SS#   Marital Status: Single  Ethnicity:    Employment Status: retired  Extended Emergency Contact Information  Primary Emergency Contact: [address] [place] [address], [address] [place] [address] Avenue Phone: [phone]  Relation: Sister  Advance Directive: Level 1 Full Code, AD Unknown    INSURANCE/COVERAGE:     Primary Payor: [facility] REP / Plan: Naomi DANIEL [facility] REP / Product Type: Medicare HMO /   Secondary Payer: Private Pay   Payer Contact: Jeromy Morataya  Payer Contact:   Contact Phone: [phone]  Contact Fax: [phone] Contact Phone:   Authorization #: [id number]  Coverage Dates: 3/24-3/30  LCD: 3/30/2020  MEDICARE #: [id number]  Medicare Days:   Medical Record #: [id number]    REFERRAL SOURCE:   Referring provider: Kaley Ko MD  Referring facility: [address] [place] [address]   Room: [address] 441/[address]4 [facility] [place] 441-*  PCP: No primary care provider on file  PCP phone number: None    MEDICAL INFORMATION  HPI: Aden Boggs with PMH of HTN and stroke with intracranial atherosclerosis on plavix presented to [facility]  on 3/20/2020 with left sided tingling, numbness, and weakness with slurred speech and ledft facial droop  NIHSS on arrival was 5  Prior to arrival he was in his basement fixing a pip when he had acute onset of of left sided weakness  He fell onto his left side but did not hit his head  He was unable to get up and his sister came to look for him and found him on the floor    EKG showed complete right bundle branch block  CT of the brain was negative  CTA of the head showed severe focal stenosis of the proximal left M1 segment, severe stenosis of the distal right V4 segment, moderate calcified atherosclerotic disease in the at the bilateral ICA terminus worse on the right, and luminal irregularity with areas of at least moderate stenosis throughout the bilateral P1 and P2 segments  HgA1c was 6 1  Cholesterol was 242 and LDL was 172  Neurology was consulted and recommended MRI, start ASA and high intensity statin, and continue plavix  DAPT was recommended to continue for 21 days until 4/10  tPA was not given as he was out of the window  MRI was completed that same day that showed an acute right thalamic lacunar infarction  Echo was also completed on 3/20 that showed an EF of 70%, no regional wall motion abnormalities, moderate concentric hypertrophy, grade 1 diastolic dysfunction, but no cardiac source for infarct  Patient was with hypokalemia with a potassium on 3 and was repleted with oral potassium  He was also with elevated troponins caused by stroked and accelerated HTN  Blood pressure medications were on hold for permissive HTN  Blood pressure medications were resumed on 3/22  PT and OT evaluated the patient and recommending inpatient rehab as well as attending physician  Speech evaluated the patient and cleared him for a regular diet and thin liquids that he is tolerating without difficulty  PM&R evaluated the patient and is recommending acute inpatient rehab  He is medically ready for transfer to rehab today  Past Medical History:   Past Surgical History: Allergies:     Past Medical History:   Diagnosis Date    Hypertension     Stroke [facility])     2017    History reviewed  No pertinent surgical history    No Known Allergies      Comorbidities/Surgeries in the last 100 days: Acute lacunar stroke, left sided weakness, ataxia, left carotid atherosclerosis, elevated troponin, HTN, HLD, DM 2,  history of stroke  CURRENT VITAL SIGNS:   Temp:  [97 6 °F (36 4 °C)-99 °F (37 2 °C)] 98 2 °F (36 8 °C)  HR:  [55-68] 55  Resp:  [18] 18  BP: (109-229)/(64-98) 130/64   Intake/Output Summary (Last 24 hours) at 3/23/2020 1551  Last data filed at 3/23/2020 0112  Gross per 24 hour   Intake    Output 775 ml   Net -775 ml        LABORATORY RESULTS:      Lab Results   Component Value Date    HGB 12 9 03/23/2020    HCT 40 2 03/23/2020    WBC 8 19 03/23/2020     Lab Results   Component Value Date    BUN 9 03/23/2020    K 4 0 03/23/2020     03/23/2020    CREATININE 0 83 03/23/2020     Lab Results   Component Value Date    PROTIME 13 5 03/20/2020    INR 1 02 03/20/2020        DIAGNOSTIC STUDIES:  X-ray Chest 1 View Portable    Result Date: 3/20/2020  Impression: No focal consolidation, pleural effusion, or pneumothorax  Workstation performed: [id number]     Mri Brain Wo Contrast    Result Date: 3/20/2020  Impression: Mild to moderate chronic microvascular ischemic disease Acute right thalamic lacunar infarction, not evident by CT Age-related atrophy Workstation performed: [id number]     Ct Stroke Alert Brain    Result Date: 3/20/2020  Impression: No acute intracranial abnormality  Microangiopathic changes  Findings were directly discussed with [facility] NAHID DE SOUZA JR on 3/20/2020 12:43 AM  Workstation performed: [id number]     Cta Stroke Alert (head/neck)    Result Date: 3/20/2020  Impression: Severe focal stenosis of the proximal left M1 segment  Severe stenosis of the distal right V4 segment  Moderate calcified atherosclerotic disease at the bilateral ICA terminus, worse on the right which extends to and involves the supraclinoid portion Luminal irregularity with areas of at least moderate stenosis throughout the bilateral P1 and P2 segments  No hemodynamically significant stenosis, dissection or occlusion of the carotid or vertebral arteries   Findings were directly discussed with VILMA Angelica Rahman on 3/20/2020 1:04 AM  Workstation performed: [id number]       PRECAUTIONS/SPECIAL NEEDS:  Tobacco:   Social History     Tobacco Use   Smoking Status Former Smoker    Packs/day: 1 00    Types: Cigarettes    Start date: 1/1/1969    Last attempt to quit: 1/1/2005    Years since quitting: 15 2   Smokeless Tobacco Never Used   , Alcohol:    Social History     Substance and Sexual Activity   Alcohol Use Never    Frequency: Never    Drinks per session: Patient refused    Binge frequency: Never   , Anticoagulation:  aspirin 81 mg orally every day, clopidogrel 75 mg orally every day and Lovenox 40mg daily , Edema Management, Safety Concerns, Pain Management, Dietary Restrictions: Sodium 4 Gram and Fall Precautions     MEDICATIONS:     Current Facility-Administered Medications:     acetaminophen (TYLENOL) tablet 650 mg, 650 mg, Oral, Q4H PRN, Dileep Minaya PA-C    aspirin (ECOTRIN LOW STRENGTH) EC tablet 81 mg, 81 mg, Oral, Daily, Seema Ford PA-C, 81 mg at 03/23/20 1763    atorvastatin (LIPITOR) tablet 80 mg, 80 mg, Oral, Daily With Ally Medina MD, 80 mg at 03/22/20 1659    calcium carbonate (TUMS) chewable tablet 1,000 mg, 1,000 mg, Oral, Daily PRN, Dileep Minaya PA-C    clopidogrel (PLAVIX) tablet 75 mg, 75 mg, Oral, Daily, Seema Ford PA-C, 75 mg at 03/23/20 0824    enoxaparin (LOVENOX) subcutaneous injection 40 mg, 40 mg, Subcutaneous, Daily, Dileep Minaya PA-C, 40 mg at 03/23/20 0825    hydrALAZINE (APRESOLINE) injection 10 mg, 10 mg, Intravenous, Q4H PRN, Jose Loco MD, 10 mg at 03/22/20 1517    iohexol (OMNIPAQUE) 350 MG/ML injection (MULTI-DOSE) 85 mL, 85 mL, Intravenous, Once in imaging, Beatrice Cedeño DO    Labetalol HCl (NORMODYNE) injection 10 mg, 10 mg, Intravenous, Q4H PRN, Virginia Michaud MD, 10 mg at 03/22/20 2327    lisinopril (ZESTRIL) tablet 40 mg, 40 mg, Oral, Daily, Virginia Michaud MD, 40 mg at 03/23/20 3056    metoprolol succinate (TOPROL-XL) 24 hr tablet 100 mg, 100 mg, Oral, Daily, Karyle Mater, MD, 100 mg at 03/23/20 0824    ondansetron (ZOFRAN) injection 4 mg, 4 mg, Intravenous, Q6H PRN, Nicholas Walters PA-C, 4 mg at 03/20/20 1837    SKIN INTEGRITY:   no rashes, no erythema, no peripheral edema    PRIOR LEVEL OF FUNCTION:  [facility] single family [facility]  Heather Sanders is single and lives alone  Self Care: Independent, Indoor Mobility: Independent, Stairs (in/outdoor): Independent and Cognition: Independent    FALLS IN THE LAST 6 MONTHS: 1-relater to hospital admission    HOME ENVIRONMENT:  The living area: bedroom on 2nd floor and bathroom on 2nd floor  Laundry is in the basement  There are 7 steps to enter the home  The patient will not have 24 hour supervision/physical assistance available upon discharge  PREVIOUS DME:  Equipment in home (previous DME): Shower Chair, Grab Bars and Single [employer]    FUNCTIONAL STATUS:  Physical Therapy Occupational Therapy Speech Therapy   As per PT:         03/23/20 1416   Pain Assessment   Pain Assessment Tool Pain Assessment not indicated - pt denies pain   Pain Score No Pain   Restrictions/Precautions   Other Precautions Chair Alarm; Bed Alarm; Fall Risk;Telemetry   General   Chart Reviewed Yes   Additional Pertinent History L hemiparesis, ataxia   Response to Previous Treatment Patient reporting fatigue but able to participate  Family/Caregiver Present No   Cognition   Overall Cognitive Status Impaired   Arousal/Participation Cooperative   Attention Attends with cues to redirect   Orientation Level Oriented X4   Memory Decreased recall of precautions;Decreased recall of recent events   Following Commands Follows one step commands with increased time or repetition   Comments increased processing time   Subjective   Subjective "I can't believe this happened to me "   Bed Mobility   Supine to Sit 4  Minimal assistance   Additional items Assist x 1;Bedrails; Increased time required;Verbal cues   Transfers   Sit to Stand 3  Moderate assistance   Additional items Assist x 2;Armrests; Bedrails; Impulsive;Verbal cues; Other  (RW)   Stand to Sit 4  Minimal assistance   Additional items Assist x 2;Armrests; Increased time required; Impulsive;Verbal cues; Other  (RW)   Stand pivot 3  Moderate assistance   Additional items Assist x 2; Increased time required;Verbal cues; Other  (RW)   Toilet transfer 3  Moderate assistance   Additional items Assist x 2; Increased time required; Impulsive;Commode; Other  (grab bar, RW)   Additional Comments pt able to progress from mod Ax2-min Ax2 with sit>stand transf  consistently requires vc for safe hand placement-poor carryover  noted impulsive transf w vc for safety  increased reliance on RUE to transf, poor LUE support   Ambulation/Elevation   Gait pattern L Hemiparesis;L Knee Maddi; Forward Flexion;Narrow BOBBY; Wide BOBBY; Decreased foot clearance; Ataxia; Inconsistent wesley; Excessively slow   Gait Assistance 3  Moderate assist   Additional items Assist x 2;Verbal cues; Tactile cues   Assistive Device Rolling walker   Distance 20'x2, 3'   Stair Management Assistance Not tested  (due to poor ambulatory balance, safety concerns)   Balance   Static Sitting Fair   Dynamic Sitting Fair -   Static Standing Poor +  (L lean)   Dynamic Standing Poor   Ambulatory Zero  (Ax2)   Endurance Deficit   Endurance Deficit Yes   Endurance Deficit Description fatigue   Activity Tolerance   Activity Tolerance Patient tolerated treatment well;Patient limited by fatigue   Medical Staff Made Aware Lary OT   Nurse Made Aware Amrit Laguna RN; cleared for PT   Exercises   Knee AROM Long Arc Quad Sitting;10 reps;AROM; Left  (1x10 w 3s hold, cues for pacing)   Ankle Pumps Sitting;10 reps;AROM; Left  (1x10, cues for pacing)   Squat Standing;5 reps;AROM  (2x5 mini squats w RW for support)   Marching Sitting;10 reps;AROM; Left  (1x10, cues for pacing)   Neuro re-ed see above for therex   Assessment Prognosis Good   Problem List Decreased strength;Decreased endurance; Impaired balance;Decreased mobility; Decreased coordination;Decreased safety awareness; Impaired judgement; Impaired sensation; Impaired tone;Decreased cognition   Assessment Keri Cervantes was seen for a follow up session  Pt demonstrating mild improvements in L sided control and upright balance  Able to progress bed mobility to min Ax1 for safety and trunk support, transf min-mod Ax2 w increased physical support for sit>stand, A for controlled descent stand>sit, frequent cuing for hand placement and safety including placement of RW  Pt able to progress amb to mod Ax2 w RW 20'x2, 3'x1  Noted significant gait deviations impacting safety including LLE ataxia, poor sequencing w RW, poor UE support, fluctuates in BOBBY due to poor motor control of LLE/inconsistent step length on L  Also noted degradation of posture w fatigue (increased L lean)  Pt w improved ambulation during second gait trial w cues for pacing, proper step length and sequencing w RW  Tolerated LE therex well, w rest breaks prn and cues for proper pacing (controlled movements)  Performed mini squats w RW for support, tactile cues to prevent L knee buckling  End of session pt seated in bedside chair, chair alarm engaged and OT present  Would benefit from STR at d/c given fall risk, decline from baseline fxn, social and environmental barriers, to improve mobility and optimize quality of life  As per OT:      03/23/20 1420   Restrictions/Precautions   Weight Bearing Precautions Per Order No   Other Precautions Chair Alarm; Bed Alarm; Fall Risk;Suicidal;Multiple lines   Pain Assessment   Pain Assessment Tool Pain Assessment not indicated - pt denies pain   Pain Score No Pain   ADL   Where Assessed Chair   Grooming Assistance 4  Minimal Assistance   Grooming Deficit Setup;Verbal cueing;Supervision/safety; Increased time to complete;Wash/dry face; Wash/dry hands   LB Dressing Assistance 3  Moderate Assistance   LB Dressing Deficit Setup;Supervision/safety;Verbal cueing; Increased time to complete; Don/doff R sock; Don/doff L sock   LB Dressing Comments impaired functional reach   [address]  2  Maximal Assistance   Toileting Deficit Setup;Steadying;Verbal cueing;Supervison/safety; Increased time to complete;Clothing management up;Clothing management down;Perineal hygiene   Toileting Comments MOD assist x1 steadying in stance w/ assist of 2nd for hygiene cleanup   Functional Standing Tolerance   Time 1:00min    Activity w/ MOD assist steadying assist during ADL tasks   Bed Mobility   Supine to Sit 4  Minimal assistance   Additional items Assist x 1;HOB elevated; Bedrails; Increased time required;LE management   Additional Comments increased time to complete and cues for safety, improved sitting balance   Transfers   Sit to Stand 3  Moderate assistance   Additional items Assist x 2; Increased time required;Verbal cues;Armrests; Impulsive   Stand to Sit 3  Moderate assistance   Additional items Assist x 2; Increased time required;Verbal cues;Armrests  (controlled descent and left sided lean )   Toilet transfer 3  Moderate assistance   Additional items Assist x 2; Increased time required;Standard toilet;Commode  (BSC over toilet and cues for hand placement and grab bar use)   Additional Comments VCs for hand placement and positioning, impulsive   Functional Mobility   Functional Mobility 3  Moderate assistance   Additional Comments assist x2 w/ cues for sequencing and impaired insight and safety awareness, unsteadiness noted   Additional items Rolling walker   Therapeutic Exercise - ROM   UE-ROM Yes   ROM- Right Upper Extremities   R Shoulder AROM; Flexion;ABduction; Extension   R Elbow AROM;Elbow flexion;Elbow extension   R Weight/Reps/Sets 2 sets x 10 reps   RUE ROM Comment tolerated well   ROM - Left Upper Extremities    L Shoulder AROM; Flexion; Extension;Prolonged stretch  (w/ end range stretching)   L Elbow AROM;Elbow extension;Elbow flexion;Prolonged stretch  (w/ cues for positioning)   L Weight/Reps/Sets 2 sets x 10 reps, w/ cues for controlled movement   LUE ROM Comment w/ towel slide w/ left UE and controlled movement, increased time for extension of digits   Cognition   Overall Cognitive Status Impaired   Arousal/Participation Responsive; Cooperative; Alert   Attention Attends with cues to redirect   Orientation Level Oriented to person;Oriented to place;Oriented to time;Oriented to situation;Oriented X4   Memory Decreased short term memory;Decreased recall of precautions   Following Commands Follows one step commands with increased time or repetition   Comments pt impulsive, cues for safety, increased processing time, flat affect, pt stating "Why did this happen to me", requiring emotional support   Additional Activities   Additional Activities    (education on safety and positioning)   Additional Activities Comments pt receptive   Activity Tolerance   Activity Tolerance Patient limited by fatigue   Medical Staff Made Aware appropriate to see per RNAda   Assessment   Assessment Pt seen for skilled OT session focused on ADLs, functional transfers and mobility, bed mobility, UE exercises and coordination tasks  Pt w/ MIN assist supine>sit bed mobility which is improvement since initial evaluation  Pt w/ MOD assist x2 sit>stand from bed w/ VCs for safety as pt impulsive  Pt w/ MOD assist x2 functional mobility w/ RW w/ lean to left side and cues for safety w/ L LE positioning during mobility  Pt w/ MOD assist x2 stand>sit transfer to toilet w/ grab bar use and cues for safety and positioning  Pt w/ MOD assist x2 sit>stand from [facility] over toilet w/ grab bar use  Pt w/ MAX assist toileting hygiene w/ mod assist steadying in stance w/ RW support   Pt w/ MOD assist x2 functional mobility w/ RW w/ left sided lean and fatigue and MOD assist x2 stand>sit transfer to recliner w/ cues for safety and controlled descent on left side  Pt w/ MIN assist for grooming task while seated  Pt w/ improvement in sitting balance since initially evaluation w/ decreased lean to left side  Pt w/ functional dynamic sitting balance w/ reaching w/ left UE in a variety of plane and Fair - sitting balance and increased control of reaching w/ L UE w/ decreased grasp control onto the objects reaching  Pt completed b/l UE exercises seen above and educated on speed of movement and control of L UE  Pt completed towel slides on Left UE to increase ROM and coordination  Pt continues to be limited due to flat affect, impulsive and decreased safety awareness, fall risk, L side impaired coordination, impaired balance, increased processing time all causing a decline in ADLs, functional transfers and mobility  Recommend STR when medically stable  Will continue to follow to address OT POC  As per SLP on 3/23/2020:    Subjective:  "I just can't believe this happened"  Appears depressed  Stared blankly at times but was polite  States he doesn't want to bother us  I told him he is not bothering us and we are here to help  Objective:  Seen at lunch for po tolerance  Pt was on clears on 3/20 due to nausea  He was upgraded to a regular diet over the weekend  Today he fed self and was able to tolerate sliced roast beef, green beans, mashed potatoes, and thin liquids  Assessment:  Speech clear though he is not very interactive  Tolerating diet  Minimal left labial droop  Plan/Recommendations:  Regular diet w/ thin liquids  D/c ST      CURRENT GAP IN FUNCTION  Prior to admission the patient was completely independent with functional mobility, ADLs, and IADLs including driving all without and assistive device  Estimated length of stay: 10 to 14 days    Anticipated Post-Discharge Disposition/Treatment  Disposition: Return to previous home/apartment    Outpatient Services: Physical Therapy (PT) and Occupational Therapy (OT)    BARRIERS TO DISCHARGE  Lovenox, Weakness, Pain, Diminished cognition/Mentation change, Balance Difficulty, Fatigue, Home Accessibility, Caregiver Accessibility, Financial Resources, Equipment Needs, Resource Availability and Lives Alone    INTERVENTIONS FOR DISCHARGE  Adaptive equipment, Patient/Family/Caregiver Education, [employer], Support Group, Financial Assistance, Arrange DME needs, Medication Changes as per MD, Therapy exercises and Center of balance support     REQUIRED THERAPY:  Patient will require PT and OT 90 minutes each per day, five days per week to achieve rehab goals  REQUIRED FUNCTIONAL AND MEDICAL MANAGEMENT FOR INPATIENT REHABILITATION:  Pain Management: Overall pain is well controlled, Deep Vein Thrombosis (DVT) Prophylaxis:  low molecular weight heparin and SCD's while in bed, nursing for education, internal medicine to monitor and manage medical conditions, PM&R to maximize function and provide mdical oversight, PT/OT intervention, patient and family education and training, participation in stroke education, and any consults as needed  RECOMMENDED LEVEL OF CARE:   [facility] on 3/20/2020 with left sided tingling, numbness, and weakness with slurred speech and ledft facial droop  NIHSS on arrival was 5  CTA of the head showed severe focal stenosis of the proximal left M1 segment, severe stenosis of the distal right V4 segment, moderate calcified atherosclerotic disease in the at the bilateral ICA terminus worse on the right, and luminal irregularity with areas of at least moderate stenosis throughout the bilateral P1 and P2 segments  Neurology was consulted and recommended MRI, start ASA and high intensity statin, and continue plavix  DAPT was recommended to continue for 21 days until 4/10  MRI was completed that same day that showed an acute right thalamic lacunar infarction    Echo was also completed on 3/20 that showed an EF of 70%, no regional wall motion abnormalities, moderate concentric hypertrophy, grade 1 diastolic dysfunction, but no cardiac source for infarct  Patient was with hypokalemia with a potassium on 3 and was repleted with oral potassium  He was also with elevated troponins caused by stroked and accelerated HTN  Blood pressure medications were on hold for permissive HTN  Blood pressure medications were resumed on 3/22  Prior to admission the patient was completely independent with functional mobility, ADLs, and IADLs including driving all without and assistive device  Currently he is a mod assist of 2 with transfers and ambulation with rolling walker as well as moderated assist with lower body ADLs and max assist with toileting  Nursing is being recommended for education and bowel/ bladder management, internal medicine to monitor and manage medical conditions, PM&R to maximize function and provide medical oversight, and inpatient rehab to maximize self care and mobility upon discharge to home with the support of his sister

## 2020-03-23 NOTE — PHYSICAL THERAPY NOTE
PHYSICAL THERAPY NOTE          Patient Name: Maulik Arvizu  NTTDO'L Date: 3/23/2020   Time In: 1335 Time Out: 1416       03/23/20 1416   Pain Assessment   Pain Assessment Tool Pain Assessment not indicated - pt denies pain   Pain Score No Pain   Restrictions/Precautions   Other Precautions Chair Alarm; Bed Alarm; Fall Risk;Telemetry   General   Chart Reviewed Yes   Additional Pertinent History L hemiparesis, ataxia   Response to Previous Treatment Patient reporting fatigue but able to participate  Family/Caregiver Present No   Cognition   Overall Cognitive Status Impaired   Arousal/Participation Cooperative   Attention Attends with cues to redirect   Orientation Level Oriented X4   Memory Decreased recall of precautions;Decreased recall of recent events   Following Commands Follows one step commands with increased time or repetition   Comments increased processing time   Subjective   Subjective "I can't believe this happened to me "   Bed Mobility   Supine to Sit 4  Minimal assistance   Additional items Assist x 1;Bedrails; Increased time required;Verbal cues   Transfers   Sit to Stand 3  Moderate assistance   Additional items Assist x 2;Armrests; Bedrails; Impulsive;Verbal cues; Other  (RW)   Stand to Sit 4  Minimal assistance   Additional items Assist x 2;Armrests; Increased time required; Impulsive;Verbal cues; Other  (RW)   Stand pivot 3  Moderate assistance   Additional items Assist x 2; Increased time required;Verbal cues; Other  (RW)   Toilet transfer 3  Moderate assistance   Additional items Assist x 2; Increased time required; Impulsive;Commode; Other  (grab bar, RW)   Additional Comments pt able to progress from mod Ax2-min Ax2 with sit>stand transf  consistently requires vc for safe hand placement-poor carryover  noted impulsive transf w vc for safety   increased reliance on RUE to transf, poor LUE support   Ambulation/Elevation   Gait pattern L Hemiparesis;L Knee Maddi; Forward Flexion;Narrow BOBBY; Wide BOBBY; Decreased foot clearance; Ataxia; Inconsistent w[place]; Excessively slow   Gait Assistance 3  Moderate assist   Additional items Assist x 2;Verbal cues; Tactile cues   Assistive Device Rolling walker   Distance 20'x2, 3'   Stair Management Assistance Not tested  (due to poor ambulatory balance, safety concerns)   Balance   Static Sitting Fair   Dynamic Sitting Fair -   Static Standing Poor +  (L lean)   Dynamic Standing Poor   Ambulatory Zero  (Ax2)   Endurance Deficit   Endurance Deficit Yes   Endurance Deficit Description fatigue   Activity Tolerance   Activity Tolerance Patient tolerated treatment well;Patient limited by fatigue   Medical Staff Made Aware Lary BERRY   Nurse Made Aware Brittnee Solano RN; cleared for PT   Exercises   Knee AROM Long Arc Quad Sitting;10 reps;AROM; Left  (1x10 w 3s hold, cues for pacing)   Ankle Pumps Sitting;10 reps;AROM; Left  (1x10, cues for pacing)   Squat Standing;5 reps;AROM  (2x5 mini squats w RW for support)   Marching Sitting;10 reps;AROM; Left  (1x10, cues for pacing)   Neuro re-ed see above for therex   Assessment   Prognosis Good   Problem List Decreased strength;Decreased endurance; Impaired balance;Decreased mobility; Decreased coordination;Decreased safety awareness; Impaired judgement; Impaired sensation; Impaired tone;Decreased cognition   Assessment Gisela Chamberlain was seen for a follow up session  Pt demonstrating mild improvements in L sided control and upright balance  Able to progress bed mobility to min Ax1 for safety and trunk support, transf min-mod Ax2 w increased physical support for sit>stand, A for controlled descent stand>sit, frequent cuing for hand placement and safety including placement of RW  Pt able to progress amb to mod Ax2 w RW 20'x2, 3'x1   Noted significant gait deviations impacting safety including LLE ataxia, poor sequencing w RW, poor UE support, fluctuates in BOBBY due to poor motor control of LLE/inconsistent step length on L  Also noted degradation of posture w fatigue (increased L lean)  Pt w improved ambulation during second gait trial w cues for pacing, proper step length and sequencing w RW  Tolerated LE therex well, w rest breaks prn and cues for proper pacing (controlled movements)  Performed mini squats w RW for support, tactile cues to prevent L knee buckling  End of session pt seated in bedside chair, chair alarm engaged and OT present  Would benefit from STR at d/c given fall risk, decline from baseline fxn, social and environmental barriers, to improve mobility and optimize quality of life  Barriers to Discharge Inaccessible home environment;Decreased caregiver support   Barriers to Discharge Comments +steps, lives alone   Goals   Patient Goals to get better   STG Expiration Date 04/03/20   Short Term Goal #1 1)  Pt will perform bed mobility with [place] demonstrating appropriate technique 100% of the time in order to improve function  2)  Perform all transfers with [place] demonstrating safe and appropriate technique 100% of the time in order to improve ability to negotiate safely in home environment  3) Amb with least restrictive AD > 200'x1 with mod I in order to demonstrate ability to negotiate in home environment  4)  Improve overall strength and balance 1/2 grade in order to optimize ability to perform functional tasks and reduce fall risk  5) Increase activity tolerance to 45 minutes in order to improve endurance to functional tasks  6)  Negotiate stairs using most appropriate technique and mod I in order to be able to negotiate safely in home environment  7) PT for ongoing patient and family/caregiver education, DME needs and d/c planning in order to promote highest level of function in least restrictive environment  PT Treatment Day 2   Plan   Treatment/Interventions Functional transfer training;LE strengthening/ROM; Therapeutic exercise;Elevations; Endurance training;Patient/family training;Equipment eval/education; Bed mobility;Gait training; Compensatory technique education;Continued evaluation;OT;Spoke to nursing   Progress Progressing toward goals   PT Frequency Other (Comment)  (5-6/wk)   Recommendation   Recommendation Short-term skilled PT   Equipment Recommended Walker   PT - OK to Discharge Yes     Cesario Canseco, PT

## 2020-03-23 NOTE — PROGRESS NOTES
Progress Note - Landen Richmond 1945, 66 y o  male MRN: [id number]    Unit/Bed#: E4 -01 Encounter: [id number]    Primary Care Provider: No primary care provider on file  Date and time admitted to hospital: 3/20/2020 12:15 AM        * Acute lacunar stroke [facility])  Assessment & Plan  Patient presenting with Left-sided upper and lower extremity weakness which he suddenly experienced when repairing pipe in the basement followed by a fall backwards, denies head trauma  Extensive intracranial disease demonstrated on CTA head - showing severe focal stenosis of proximal left M Segment and severe stenosis of the distal right V4 segment  MRI brain revealed "Acute right thalamic lacunar infarction" not demonstrated on CT head   Patient presented out of window for tPA  Continue ASA/plavix as well as high-intensity statin  Echo unremarkable, preserved EF, grade 1 diastolic dysfunction   Neurology recommendations appreciated  Resume home BP medications  Patient to be discharged to [facility] vs Acute rehab - PMR consult placed, input appreicated    Elevated troponin  Assessment & Plan  Non-MI elevation due to stroke/accelerated HTN   No chest pain       HTN (hypertension)  Assessment & Plan  Patient initially presented with accelerated hypertension - antihypertensives were held for permissive hypertension  Resume home regimen, BP markedly elevated and may need an additional medication            VTE Pharmacologic Prophylaxis:   Pharmacologic:  Lovenox    Patient Centered Rounds: I have performed bedside rounds with nursing staff today  Time Spent for Care: 20 minutes  More than 50% of total time spent on counseling and coordination of care as described above      Current Length of Stay: 3 day(s)    Current Patient Status: Inpatient   Certification Statement: The patient will continue to require additional inpatient hospital stay due to Rehab placement    Discharge Plan / Estimated Discharge Date: TBD    Code Status: Level 1 - Full Code      Subjective:   Patient seen and examined at bedside, currently denies any complaints, denies any nausea, vomiting  Objective:     Vitals:   Temp (24hrs), Av 2 °F (36 8 °C), Min:97 3 °F (36 3 °C), Max:99 °F (37 2 °C)    Temp:  [97 3 °F (36 3 °C)-99 °F (37 2 °C)] 98 7 °F (37 1 °C)  HR:  [55-68] 68  Resp:  [18] 18  BP: (109-242)/() 170/79  SpO2:  [93 %-97 %] 93 %  Body mass index is 22 27 kg/m²  Input and Output Summary (last 24 hours): Intake/Output Summary (Last 24 hours) at 3/23/2020 1425  Last data filed at 3/23/2020 0112  Gross per 24 hour   Intake    Output 775 ml   Net -775 ml       Physical Exam:    Constitutional: Patient is oriented to person, place and time, no acute distress  HEENT:  Normocephalic, atraumatic  Cardiovascular: Normal S1S2, RRR, No murmurs/rubs/gallops appreciated  Pulmonary:  Bilateral air entry, No rhonchi/rales/wheezing appreciated  Abdominal: Soft, Bowel sounds present, Non-tender, Non-distended  Extremities:  No cyanosis, clubbing or edema  Neurological: Cranial nerves II-XII grossly intact, motor strength 5/5 in right upper and right lower extremity, motor strength 4/5 in left upper and left lower extremity, dysmetria of finger to nose on left    Additional Data:     Labs:    Results from last 7 days   Lab Units 20  0514   WBC Thousand/uL 8 19   HEMOGLOBIN g/dL 12 9   HEMATOCRIT % 40 2   PLATELETS Thousands/uL 244   NEUTROS PCT % 60   LYMPHS PCT % 25   MONOS PCT % 12   EOS PCT % 2     Results from last 7 days   Lab Units 20  0514   POTASSIUM mmol/L 4 0   CHLORIDE mmol/L 103   CO2 mmol/L 26   BUN mg/dL 9   CREATININE mg/dL 0 83   CALCIUM mg/dL 9 3     Results from last 7 days   Lab Units 20  0026   INR  1 02        I Have Reviewed All Lab Data Listed Above          Recent Cultures (last 7 days):           Last 24 Hours Medication List:     Current Facility-Administered Medications:  acetaminophen 650 mg Oral Q4H PRN Herlene Jewish ALBERT Bolivar   aspirin 81 mg Oral Daily Seema Ford PA-C   atorvastatin 80 mg Oral Daily With Charity Corral MD   calcium carbonate 1,000 mg Oral Daily PRN Nicholas Walters PA-C   clopidogrel 75 mg Oral Daily Seema Ford PA-C   enoxaparin 40 mg Subcutaneous Daily Nicholas Walters PA-C   hydrALAZINE 10 mg Intravenous Q4H PRN Marcia Wilde MD   iohexol 85 mL Intravenous Once in imaging Marck Torres DO   Labetalol HCl 10 mg Intravenous Q4H PRN Karyle Mater, MD   lisinopril 40 mg Oral Daily Karyle Mater, MD   metoprolol succinate 100 mg Oral Daily Karyle Mater, MD   ondansetron 4 mg Intravenous Q6H PRN Nicholas Walters PA-C        Today, Patient Was Seen By: Hailee Bustillos MD

## 2020-03-23 NOTE — SPEECH THERAPY NOTE
Speech Language/Pathology    Speech/Language Pathology Progress Note    Patient Name: Kateryna Kim  RLOPZ'J Date: 3/23/2020     Problem List  Principal Problem:    Acute lacunar stroke [facility])  Active Problems:    HTN (hypertension)    Elevated troponin    Hypokalemia       Past Medical History  Past Medical History:   Diagnosis Date    Hypertension     Stroke ([facility] [address] )     2017        Past Surgical History  History reviewed  No pertinent surgical history  Subjective:  "I just can't believe this happened"  Appears depressed  Stared blankly at times but was polite  States he doesn't want to bother us  I told him he is not bothering us and we are here to help  Objective:  Seen at lunch for po tolerance  Pt was on clears on 3/20 due to nausea  He was upgraded to a regular diet over the weekend  Today he fed self and was able to tolerate sliced roast beef, green beans, mashed potatoes, and thin liquids  Assessment:  Speech clear though he is not very interactive  Tolerating diet  Minimal left labial droop  Plan/Recommendations:  Regular diet w/ thin liquids  D/c ST

## 2020-03-24 LAB
ANION GAP SERPL CALCULATED.3IONS-SCNC: 10 MMOL/L (ref 4–13)
BASOPHILS # BLD AUTO: 0.04 THOUSANDS/ΜL (ref 0–0.1)
BASOPHILS NFR BLD AUTO: 1 % (ref 0–1)
BUN SERPL-MCNC: 12 MG/DL (ref 5–25)
CALCIUM SERPL-MCNC: 9.5 MG/DL (ref 8.3–10.1)
CHLORIDE SERPL-SCNC: 103 MMOL/L (ref 100–108)
CO2 SERPL-SCNC: 27 MMOL/L (ref 21–32)
CREAT SERPL-MCNC: 0.77 MG/DL (ref 0.6–1.3)
EOSINOPHIL # BLD AUTO: 0.16 THOUSAND/ΜL (ref 0–0.61)
EOSINOPHIL NFR BLD AUTO: 2 % (ref 0–6)
ERYTHROCYTE [DISTWIDTH] IN BLOOD BY AUTOMATED COUNT: 13.4 % (ref 11.6–15.1)
GFR SERPL CREATININE-BSD FRML MDRD: 88 ML/MIN/1.73SQ M
GLUCOSE SERPL-MCNC: 122 MG/DL (ref 65–140)
HCT VFR BLD AUTO: 41.5 % (ref 36.5–49.3)
HGB BLD-MCNC: 13.4 G/DL (ref 12–17)
IMM GRANULOCYTES # BLD AUTO: 0.03 THOUSAND/UL (ref 0–0.2)
IMM GRANULOCYTES NFR BLD AUTO: 0 % (ref 0–2)
LYMPHOCYTES # BLD AUTO: 1.9 THOUSANDS/ΜL (ref 0.6–4.47)
LYMPHOCYTES NFR BLD AUTO: 22 % (ref 14–44)
MCH RBC QN AUTO: 29.2 PG (ref 26.8–34.3)
MCHC RBC AUTO-ENTMCNC: 32.3 G/DL (ref 31.4–37.4)
MCV RBC AUTO: 90 FL (ref 82–98)
MONOCYTES # BLD AUTO: 0.96 THOUSAND/ΜL (ref 0.17–1.22)
MONOCYTES NFR BLD AUTO: 11 % (ref 4–12)
NEUTROPHILS # BLD AUTO: 5.63 THOUSANDS/ΜL (ref 1.85–7.62)
NEUTS SEG NFR BLD AUTO: 64 % (ref 43–75)
NRBC BLD AUTO-RTO: 0 /100 WBCS
PLATELET # BLD AUTO: 232 THOUSANDS/UL (ref 149–390)
PMV BLD AUTO: 11.3 FL (ref 8.9–12.7)
POTASSIUM SERPL-SCNC: 3.6 MMOL/L (ref 3.5–5.3)
RBC # BLD AUTO: 4.59 MILLION/UL (ref 3.88–5.62)
SODIUM SERPL-SCNC: 140 MMOL/L (ref 136–145)
WBC # BLD AUTO: 8.72 THOUSAND/UL (ref 4.31–10.16)

## 2020-03-24 PROCEDURE — 85025 COMPLETE CBC W/AUTO DIFF WBC: CPT | Performed by: INTERNAL MEDICINE

## 2020-03-24 PROCEDURE — 99232 SBSQ HOSP IP/OBS MODERATE 35: CPT | Performed by: INTERNAL MEDICINE

## 2020-03-24 PROCEDURE — 80048 BASIC METABOLIC PNL TOTAL CA: CPT | Performed by: INTERNAL MEDICINE

## 2020-03-24 RX ORDER — ONDANSETRON 2 MG/ML
4 INJECTION INTRAMUSCULAR; INTRAVENOUS EVERY 6 HOURS PRN
Status: CANCELLED | OUTPATIENT
Start: 2020-03-24

## 2020-03-24 RX ORDER — METOPROLOL SUCCINATE 50 MG/1
100 TABLET, EXTENDED RELEASE ORAL DAILY
Status: CANCELLED | OUTPATIENT
Start: 2020-03-25

## 2020-03-24 RX ORDER — HYDRALAZINE HYDROCHLORIDE 20 MG/ML
10 INJECTION INTRAMUSCULAR; INTRAVENOUS EVERY 4 HOURS PRN
Status: CANCELLED | OUTPATIENT
Start: 2020-03-24

## 2020-03-24 RX ORDER — LISINOPRIL 20 MG/1
40 TABLET ORAL DAILY
Status: CANCELLED | OUTPATIENT
Start: 2020-03-25

## 2020-03-24 RX ORDER — ASPIRIN 81 MG/1
81 TABLET ORAL DAILY
Status: CANCELLED | OUTPATIENT
Start: 2020-03-25

## 2020-03-24 RX ORDER — HYDROCHLOROTHIAZIDE 25 MG/1
25 TABLET ORAL DAILY
Status: DISCONTINUED | OUTPATIENT
Start: 2020-03-24 | End: 2020-03-25 | Stop reason: HOSPADM

## 2020-03-24 RX ORDER — CLOPIDOGREL BISULFATE 75 MG/1
75 TABLET ORAL DAILY
Status: CANCELLED | OUTPATIENT
Start: 2020-03-25

## 2020-03-24 RX ORDER — ATORVASTATIN CALCIUM 80 MG/1
80 TABLET, FILM COATED ORAL
Status: CANCELLED | OUTPATIENT
Start: 2020-03-24

## 2020-03-24 RX ORDER — LABETALOL 20 MG/4 ML (5 MG/ML) INTRAVENOUS SYRINGE
10 EVERY 4 HOURS PRN
Status: CANCELLED | OUTPATIENT
Start: 2020-03-24

## 2020-03-24 RX ORDER — CALCIUM CARBONATE 200(500)MG
1000 TABLET,CHEWABLE ORAL DAILY PRN
Status: CANCELLED | OUTPATIENT
Start: 2020-03-24

## 2020-03-24 RX ORDER — ACETAMINOPHEN 325 MG/1
650 TABLET ORAL EVERY 4 HOURS PRN
Status: CANCELLED | OUTPATIENT
Start: 2020-03-24

## 2020-03-24 RX ADMIN — ASPIRIN 81 MG: 81 TABLET, COATED ORAL at 08:42

## 2020-03-24 RX ADMIN — METOPROLOL SUCCINATE 100 MG: 50 TABLET, EXTENDED RELEASE ORAL at 08:43

## 2020-03-24 RX ADMIN — LABETALOL 20 MG/4 ML (5 MG/ML) INTRAVENOUS SYRINGE 10 MG: at 12:10

## 2020-03-24 RX ADMIN — ENOXAPARIN SODIUM 40 MG: 40 INJECTION SUBCUTANEOUS at 08:42

## 2020-03-24 RX ADMIN — HYDRALAZINE HYDROCHLORIDE 10 MG: 20 INJECTION INTRAMUSCULAR; INTRAVENOUS at 16:32

## 2020-03-24 RX ADMIN — CLOPIDOGREL BISULFATE 75 MG: 75 TABLET ORAL at 08:43

## 2020-03-24 RX ADMIN — LISINOPRIL 40 MG: 20 TABLET ORAL at 08:42

## 2020-03-24 RX ADMIN — HYDROCHLOROTHIAZIDE 25 MG: 25 TABLET ORAL at 17:43

## 2020-03-24 RX ADMIN — ATORVASTATIN CALCIUM 80 MG: 80 TABLET, FILM COATED ORAL at 16:32

## 2020-03-24 NOTE — PLAN OF CARE
Problem: Potential for Falls  Goal: Patient will remain free of falls  Description  INTERVENTIONS:  - Assess patient frequently for physical needs  -  Identify cognitive and physical deficits and behaviors that affect risk of falls  -  [place] fall precautions as indicated by assessment   - Educate patient/family on patient safety including physical limitations  - Instruct patient to call for assistance with activity based on assessment  - Modify environment to reduce risk of injury  - Consider OT/PT consult to assist with strengthening/mobility  Outcome: Progressing     Problem: Neurological Deficit  Goal: Neurological status is stable or improving  Description  Interventions:  - Monitor and assess patient's level of consciousness, motor function, sensory function, and level of assistance needed for ADLs  - Monitor and report changes from baseline  Collaborate with interdisciplinary team to initiate plan and implement interventions as ordered  - Provide and maintain a safe environment  - Consider seizure precautions  - Consider fall precautions  - Consider aspiration precautions  - Consider bleeding precautions  Outcome: Progressing     Problem: Activity Intolerance/Impaired Mobility  Goal: Mobility/activity is maintained at optimum level for patient  Description  Interventions:  - Assess and monitor patient  barriers to mobility and need for assistive/adaptive devices  - Assess patient's emotional response to limitations  - Collaborate with interdisciplinary team and initiate plans and interventions as ordered  - Encourage independent activity per ability   - Maintain proper body alignment  - Perform active/passive rom as tolerated/ordered    - Plan activities to conserve energy   - Turn patient as appropriate  Outcome: Progressing     Problem: Communication Impairment  Goal: Ability to express needs and understand communication  Description  Assess patient's communication skills and ability to understand information  Patient will demonstrate use of effective communication techniques, alternative methods of communication and understanding even if not able to speak  - Encourage communication and provide alternate methods of communication as needed  - Collaborate with case management/ for discharge needs  - Include patient/family/caregiver in decisions related to communication  Outcome: Progressing     Problem: Potential for Aspiration  Goal: Non-ventilated patient's risk of aspiration is minimized  Description  Assess and monitor vital signs, respiratory status, and labs (WBC)  Monitor for signs of aspiration (tachypnea, cough, rales, wheezing, cyanosis, fever)  - Assess and monitor patient's ability to swallow  - Place patient up in chair to eat if possible  - HOB up at 90 degrees to eat if unable to get patient up into chair   - Supervise patient during oral intake  - Instruct patient/ family to take small bites  - Instruct patient/ family to take small single sips when taking liquids  - Follow patient-specific strategies generated by speech pathologist   Outcome: Progressing     Problem: NEUROSENSORY - ADULT  Goal: Achieves stable or improved neurological status  Description  INTERVENTIONS  - Monitor and report changes in neurological status  - Monitor vital signs such as temperature, blood pressure, glucose, and any other labs ordered   - Initiate measures to prevent increased intracranial pressure  - Monitor for seizure activity and implement precautions if appropriate      Outcome: Progressing  Goal: Achieves maximal functionality and self care  Description  INTERVENTIONS  - Monitor swallowing and airway patency with patient fatigue and changes in neurological status  - Encourage and assist patient to increase activity and self care     - Encourage visually impaired, hearing impaired and aphasic patients to use assistive/communication devices  Outcome: Progressing Problem: CARDIOVASCULAR - ADULT  Goal: Absence of cardiac dysrhythmias or at baseline rhythm  Description  INTERVENTIONS:  - Continuous cardiac monitoring, vital signs, obtain 12 lead EKG if ordered  - Administer antiarrhythmic and heart rate control medications as ordered  - Monitor electrolytes and administer replacement therapy as ordered  Outcome: Progressing     Problem: GASTROINTESTINAL - ADULT  Goal: Minimal or absence of nausea and/or vomiting  Description  INTERVENTIONS:  - Administer IV fluids if ordered to ensure adequate hydration  - Maintain NPO status until nausea and vomiting are resolved  - Nasogastric tube if ordered  - Administer ordered antiemetic medications as needed  - Provide nonpharmacologic comfort measures as appropriate  - Advance diet as tolerated, if ordered  - Consider nutrition services referral to assist patient with adequate nutrition and appropriate food choices  Outcome: Progressing     Problem: METABOLIC, FLUID AND ELECTROLYTES - ADULT  Goal: Glucose maintained within target range  Description  INTERVENTIONS:  - Monitor Blood Glucose as ordered  - Assess for signs and symptoms of hyperglycemia and hypoglycemia  - Administer ordered medications to maintain glucose within target range  - Assess nutritional intake and initiate nutrition service referral as needed  Outcome: Progressing     Problem: PAIN - ADULT  Goal: Verbalizes/displays adequate comfort level or baseline comfort level  Description  Interventions:  - Encourage patient to monitor pain and request assistance  - Assess pain using appropriate pain scale  - Administer analgesics based on type and severity of pain and evaluate response  - Implement non-pharmacological measures as appropriate and evaluate response  - Consider cultural and social influences on pain and pain management  - Notify physician/advanced practitioner if interventions unsuccessful or patient reports new pain  Outcome: Progressing     Problem: DISCHARGE PLANNING  Goal: Discharge to home or other facility with appropriate resources  Description  INTERVENTIONS:  - Identify barriers to discharge w/patient and caregiver  - Arrange for needed discharge resources and transportation as appropriate  - Identify discharge learning needs (meds, wound care, etc )  - Arrange for interpretive services to assist at discharge as needed  - Refer to Case Management Department for coordinating discharge planning if the patient needs post-hospital services based on physician/advanced practitioner order or complex needs related to functional status, cognitive ability, or social support system  Outcome: Progressing     Problem: Knowledge Deficit  Goal: Patient/family/caregiver demonstrates understanding of disease process, treatment plan, medications, and discharge instructions  Description  Complete learning assessment and assess knowledge base  Interventions:  - Provide teaching at level of understanding  - Provide teaching via preferred learning methods  Outcome: Progressing     Problem: Nutrition/Hydration-ADULT  Goal: Nutrient/Hydration intake appropriate for improving, restoring or maintaining nutritional needs  Description  Monitor and assess patient's nutrition/hydration status for malnutrition  Collaborate with interdisciplinary team and initiate plan and interventions as ordered  Monitor patient's weight and dietary intake as ordered or per policy  Utilize nutrition screening tool and intervene as necessary  Determine patient's food preferences and provide high-protein, high-caloric foods as appropriate       INTERVENTIONS:  - Monitor oral intake, urinary output, labs, and treatment plans  - Assess nutrition and hydration status and recommend course of action  - Evaluate amount of meals eaten  - Assist patient with eating if necessary   - Allow adequate time for meals  - Recommend/ encourage appropriate diets, oral nutritional supplements, and vitamin/mineral supplements  - Order, calculate, and assess calorie counts as needed  - Recommend, monitor, and adjust tube feedings and TPN/PPN based on assessed needs  - Assess need for intravenous fluids  - Provide specific nutrition/hydration education as appropriate  - Include patient/family/caregiver in decisions related to nutrition  Outcome: Progressing     Problem: Prexisting or High Potential for Compromised Skin Integrity  Goal: Skin integrity is maintained or improved  Description  INTERVENTIONS:  - Identify patients at risk for skin breakdown  - Assess and monitor skin integrity  - Assess and monitor nutrition and hydration status  - Monitor labs   - Assess for incontinence   - Turn and reposition patient  - Assist with mobility/ambulation  - Relieve pressure over bony prominences  - Avoid friction and shearing  - Provide appropriate hygiene as needed including keeping skin clean and dry  - Evaluate need for skin moisturizer/barrier cream  - Collaborate with interdisciplinary team   - Patient/family teaching  - Consider wound care consult   Outcome: Progressing

## 2020-03-24 NOTE — ASSESSMENT & PLAN NOTE
Patient presenting with Left-sided upper and lower extremity weakness which he suddenly experienced when repairing pipe in the basement followed by a fall backwards, denies head trauma  Extensive intracranial disease demonstrated on CTA head - showing severe focal stenosis of proximal left M Segment and severe stenosis of the distal right V4 segment    MRI brain revealed "Acute right thalamic lacunar infarction" not demonstrated on CT head   Patient presented out of window for tPA  Continue ASA/plavix as well as high-intensity statin  Echo unremarkable, preserved EF, grade 1 diastolic dysfunction   Neurology recommendations appreciated  Resume home BP medications  Patient to be discharged to [facility] vs Acute rehab - PMR consult placed, input appreicated

## 2020-03-24 NOTE — CONSULTS
PHYSICAL MEDICINE AND REHABILITATION CONSULT NOTE  Alison Alexander 66 y o  male MRN: [id number]  Unit/Bed#: E4 -01 Encounter: [id number]    Requested by (Physician/Service): Audria Nyhan, MD  Reason for Consultation:  Assessment of rehabilitation needs  Reason for Hospitalization:  Nausea & vomiting [R11 2]  CVA (cerebral vascular accident) ([facility] ) [I63 9]  Elevated troponin [R79 89]  RBBB [I45 10]  Rehabilitation Diagnosis: CVA    CC: CVA  History of Present Illness:  Alison Alexander is a 66 y o  male who  has a past medical history of Hypertension and Stroke ([facility] )  who presented to the [facility] with left arm + leg weakness and left facial droop and found to have acute right thalamic infarct  Location: left arm/leg  Quality: weakness   Severity: see physical exam   Duration: symptoms began on 3/19  Timing: acute   Context: CVA  Modifying factors: NA   Associating signs & symptoms: L facial droop       PM&R consulted for rehabilitation recommendations  Subjective : d/w patient rehab recommendations     ROS: 10 point review of systems negative unless noted above     Hospital Complications/Comorbidities:   Complications: As above  Comorbidities: As above        Functional History:     Prior to Admission:     I PTA      Present:  Physical Therapy Occupational Therapy   Min-mod x 2 tx, mod x 2 amb  Mod-max ADLs      Past Medical History:   Past Surgical History:   Family History:     Past Medical History:   Diagnosis Date    Hypertension     Stroke ([facility] )     2017    History reviewed  No pertinent surgical history  History reviewed  No pertinent family history          Medications:    Current Facility-Administered Medications:     acetaminophen (TYLENOL) tablet 650 mg, 650 mg, Oral, Q4H PRN, Kitty Goodpasture, PA-C    aspirin (ECOTRIN LOW STRENGTH) EC tablet 81 mg, 81 mg, Oral, Daily, Seema Ford PA-C, 81 mg at 03/23/20 0824    atorvastatin (LIPITOR) tablet 80 mg, 80 mg, Oral, Daily With Bo Greene MD, 80 mg at 03/23/20 1655    calcium carbonate (TUMS) chewable tablet 1,000 mg, 1,000 mg, Oral, Daily PRN, Wilmer Bullard PA-C    clopidogrel (PLAVIX) tablet 75 mg, 75 mg, Oral, Daily, Seema Ford PA-C, 75 mg at 03/23/20 0824    enoxaparin (LOVENOX) subcutaneous injection 40 mg, 40 mg, Subcutaneous, Daily, Wilmer Bulladr PA-C, 40 mg at 03/23/20 0825    hydrALAZINE (APRESOLINE) injection 10 mg, 10 mg, Intravenous, Q4H PRN, Nahomi Figueroa MD, 10 mg at 03/22/20 1517    iohexol (OMNIPAQUE) 350 MG/ML injection (MULTI-DOSE) 85 mL, 85 mL, Intravenous, Once in imaging, Brenda Bustillos DO    Labetalol HCl (NORMODYNE) injection 10 mg, 10 mg, Intravenous, Q4H PRN, Dylan Branch MD, 10 mg at 03/22/20 2327    lisinopril (ZESTRIL) tablet 40 mg, 40 mg, Oral, Daily, Dylan Branch MD, 40 mg at 03/23/20 0824    metoprolol succinate (TOPROL-XL) 24 hr tablet 100 mg, 100 mg, Oral, Daily, Dylan Branch MD, 100 mg at 03/23/20 0824    ondansetron (ZOFRAN) injection 4 mg, 4 mg, Intravenous, Q6H PRN, Wilmer Bullard PA-C, 4 mg at 03/20/20 1837    Allergies:   No Known Allergies     Family Hx:  History reviewed  No pertinent family history      Social History:    Social History     Socioeconomic History    Marital status: Single     Spouse name: None    Number of children: None    Years of education: None    Highest education level: None   Occupational History    None   Social Needs    Financial resource strain: None    Food insecurity:     Worry: None     Inability: None    Transportation needs:     Medical: None     Non-medical: None   Tobacco Use    Smoking status: Former Smoker     Packs/day: 1 00     Types: Cigarettes     Start date: 1/1/1969     Last attempt to quit: 1/1/2005     Years since quitting: 15 2    Smokeless tobacco: Never Used   Substance and Sexual Activity    Alcohol use: Never     Frequency: Never     Drinks per session: Patient refused     Binge frequency: Never    Drug use: Never    Sexual activity: None   Lifestyle    Physical activity:     Days per week: None     Minutes per session: None    Stress: None   Relationships    Social connections:     Talks on phone: None     Gets together: None     Attends Sikh service: None     Active member of club or organization: None     Attends meetings of clubs or organizations: None     Relationship status: None    Intimate partner violence:     Fear of current or ex partner: None     Emotionally abused: None     Physically abused: None     Forced sexual activity: None   Other Topics Concern    None   Social History Narrative    None        Marcelo Viera lives alone  He lives in a(n) single family home  The living area: bedroom on 2nd floor and bathroom on 2nd floor            Physical Exam:  Vital Signs:      Temp:  [97 6 °F (36 4 °C)-99 6 °F (37 6 °C)] 99 6 °F (37 6 °C)  HR:  [55-69] 69  Resp:  [18] 18  BP: (130-192)/(64-98) 137/69   Intake/Output Summary (Last 24 hours) at 3/23/2020 2034  Last data filed at 3/23/2020 1907  Gross per 24 hour   Intake    Output 600 ml   Net -600 ml        Laboratory:      Lab Results   Component Value Date    HGB 12 9 03/23/2020    HCT 40 2 03/23/2020    WBC 8 19 03/23/2020     Lab Results   Component Value Date    BUN 9 03/23/2020    K 4 0 03/23/2020     03/23/2020    CREATININE 0 83 03/23/2020     Lab Results   Component Value Date    PROTIME 13 5 03/20/2020    INR 1 02 03/20/2020        General: alert, no apparent distress, cooperative and comfortable  HEENT: Normal, normocephalic, atraumatic    Pulmonary: respirations unlabored   Cardiovascular: +S1/2  Abdomen: soft NT  Extremity: volume status currently stable  Neurologic: lt touch grossly intact, EOMI, +facial asymmetry, + tongue deviation, 4-/5 LUE/LLE, 4+/5 RUE/RLE  Psych: mood/affect currently stable       Imaging: Reviewed  X-ray Chest 1 View Portable    Result Date: 3/20/2020  Impression: No focal consolidation, pleural effusion, or pneumothorax  Workstation performed: [id number]     Mri Brain Wo Contrast    Result Date: 3/20/2020  Impression: Mild to moderate chronic microvascular ischemic disease Acute right thalamic lacunar infarction, not evident by CT Age-related atrophy Workstation performed: [id number]     Ct Stroke Alert Brain    Result Date: 3/20/2020  Impression: No acute intracranial abnormality  Microangiopathic changes  Findings were directly discussed with [facility] NAHID DE SOUZA JR on 3/20/2020 12:43 AM  Workstation performed: [id number]     Cta Stroke Alert (head/neck)    Result Date: 3/20/2020  Impression: Severe focal stenosis of the proximal left M1 segment  Severe stenosis of the distal right V4 segment  Moderate calcified atherosclerotic disease at the bilateral ICA terminus, worse on the right which extends to and involves the supraclinoid portion Luminal irregularity with areas of at least moderate stenosis throughout the bilateral P1 and P2 segments  No hemodynamically significant stenosis, dissection or occlusion of the carotid or vertebral arteries  Findings were directly discussed with Donn Aden on 3/20/2020 1:04 AM  Workstation performed: [id number]       Assessment and Recommendations:  Patient is a 67 yo male who presented to the [address] with left arm + leg weakness and left facial droop and found to have acute right thalamic infarct  PM&R consulted for rehabilitation recommendations  Impairments:  Impaired functional mobility and ability to perform ADL's      Recommendations:      - Continue PT/OT/SLP while inpatient  - recommend acute inpt rehab when medically cleared by primary team       Thank you for allowing the PM&R service to participate in the care of this patient  We will continue to follow Marco A Lau's progress with you   Please do not hesitate to call with questions or concerns

## 2020-03-24 NOTE — NURSING NOTE
/86  10 mg IV labetalol given  Will recheck BP in 1 hour  Dr Box Range notified      Addendum: /85 @ 1257

## 2020-03-24 NOTE — PLAN OF CARE
Problem: Potential for Falls  Goal: Patient will remain free of falls  Description  INTERVENTIONS:  - Assess patient frequently for physical needs  -  Identify cognitive and physical deficits and behaviors that affect risk of falls  -  [place] fall precautions as indicated by assessment   - Educate patient/family on patient safety including physical limitations  - Instruct patient to call for assistance with activity based on assessment  - Modify environment to reduce risk of injury  - Consider OT/PT consult to assist with strengthening/mobility  Outcome: Progressing     Problem: Neurological Deficit  Goal: Neurological status is stable or improving  Description  Interventions:  - Monitor and assess patient's level of consciousness, motor function, sensory function, and level of assistance needed for ADLs  - Monitor and report changes from baseline  Collaborate with interdisciplinary team to initiate plan and implement interventions as ordered  - Provide and maintain a safe environment  - Consider seizure precautions  - Consider fall precautions  - Consider aspiration precautions  - Consider bleeding precautions  Outcome: Progressing     Problem: Activity Intolerance/Impaired Mobility  Goal: Mobility/activity is maintained at optimum level for patient  Description  Interventions:  - Assess and monitor patient  barriers to mobility and need for assistive/adaptive devices  - Assess patient's emotional response to limitations  - Collaborate with interdisciplinary team and initiate plans and interventions as ordered  - Encourage independent activity per ability   - Maintain proper body alignment  - Perform active/passive rom as tolerated/ordered    - Plan activities to conserve energy   - Turn patient as appropriate  Outcome: Progressing     Problem: Communication Impairment  Goal: Ability to express needs and understand communication  Description  Assess patient's communication skills and ability to understand information  Patient will demonstrate use of effective communication techniques, alternative methods of communication and understanding even if not able to speak  - Encourage communication and provide alternate methods of communication as needed  - Collaborate with case management/ for discharge needs  - Include patient/family/caregiver in decisions related to communication  Outcome: Progressing     Problem: Potential for Aspiration  Goal: Non-ventilated patient's risk of aspiration is minimized  Description  Assess and monitor vital signs, respiratory status, and labs (WBC)  Monitor for signs of aspiration (tachypnea, cough, rales, wheezing, cyanosis, fever)  - Assess and monitor patient's ability to swallow  - Place patient up in chair to eat if possible  - HOB up at 90 degrees to eat if unable to get patient up into chair   - Supervise patient during oral intake  - Instruct patient/ family to take small bites  - Instruct patient/ family to take small single sips when taking liquids  - Follow patient-specific strategies generated by speech pathologist   Outcome: Progressing     Problem: NEUROSENSORY - ADULT  Goal: Achieves stable or improved neurological status  Description  INTERVENTIONS  - Monitor and report changes in neurological status  - Monitor vital signs such as temperature, blood pressure, glucose, and any other labs ordered   - Initiate measures to prevent increased intracranial pressure  - Monitor for seizure activity and implement precautions if appropriate      Outcome: Progressing  Goal: Achieves maximal functionality and self care  Description  INTERVENTIONS  - Monitor swallowing and airway patency with patient fatigue and changes in neurological status  - Encourage and assist patient to increase activity and self care     - Encourage visually impaired, hearing impaired and aphasic patients to use assistive/communication devices  Outcome: Progressing Problem: CARDIOVASCULAR - ADULT  Goal: Absence of cardiac dysrhythmias or at baseline rhythm  Description  INTERVENTIONS:  - Continuous cardiac monitoring, vital signs, obtain 12 lead EKG if ordered  - Administer antiarrhythmic and heart rate control medications as ordered  - Monitor electrolytes and administer replacement therapy as ordered  Outcome: Progressing     Problem: GASTROINTESTINAL - ADULT  Goal: Minimal or absence of nausea and/or vomiting  Description  INTERVENTIONS:  - Administer IV fluids if ordered to ensure adequate hydration  - Maintain NPO status until nausea and vomiting are resolved  - Nasogastric tube if ordered  - Administer ordered antiemetic medications as needed  - Provide nonpharmacologic comfort measures as appropriate  - Advance diet as tolerated, if ordered  - Consider nutrition services referral to assist patient with adequate nutrition and appropriate food choices  Outcome: Progressing     Problem: METABOLIC, FLUID AND ELECTROLYTES - ADULT  Goal: Glucose maintained within target range  Description  INTERVENTIONS:  - Monitor Blood Glucose as ordered  - Assess for signs and symptoms of hyperglycemia and hypoglycemia  - Administer ordered medications to maintain glucose within target range  - Assess nutritional intake and initiate nutrition service referral as needed  Outcome: Progressing     Problem: PAIN - ADULT  Goal: Verbalizes/displays adequate comfort level or baseline comfort level  Description  Interventions:  - Encourage patient to monitor pain and request assistance  - Assess pain using appropriate pain scale  - Administer analgesics based on type and severity of pain and evaluate response  - Implement non-pharmacological measures as appropriate and evaluate response  - Consider cultural and social influences on pain and pain management  - Notify physician/advanced practitioner if interventions unsuccessful or patient reports new pain  Outcome: Progressing     Problem: DISCHARGE PLANNING  Goal: Discharge to home or other facility with appropriate resources  Description  INTERVENTIONS:  - Identify barriers to discharge w/patient and caregiver  - Arrange for needed discharge resources and transportation as appropriate  - Identify discharge learning needs (meds, wound care, etc )  - Arrange for interpretive services to assist at discharge as needed  - Refer to Case Management Department for coordinating discharge planning if the patient needs post-hospital services based on physician/advanced practitioner order or complex needs related to functional status, cognitive ability, or social support system  Outcome: Progressing     Problem: Nutrition/Hydration-ADULT  Goal: Nutrient/Hydration intake appropriate for improving, restoring or maintaining nutritional needs  Description  Monitor and assess patient's nutrition/hydration status for malnutrition  Collaborate with interdisciplinary team and initiate plan and interventions as ordered  Monitor patient's weight and dietary intake as ordered or per policy  Utilize nutrition screening tool and intervene as necessary  Determine patient's food preferences and provide high-protein, high-caloric foods as appropriate       INTERVENTIONS:  - Monitor oral intake, urinary output, labs, and treatment plans  - Assess nutrition and hydration status and recommend course of action  - Evaluate amount of meals eaten  - Assist patient with eating if necessary   - Allow adequate time for meals  - Recommend/ encourage appropriate diets, oral nutritional supplements, and vitamin/mineral supplements  - Order, calculate, and assess calorie counts as needed  - Recommend, monitor, and adjust tube feedings and TPN/PPN based on assessed needs  - Assess need for intravenous fluids  - Provide specific nutrition/hydration education as appropriate  - Include patient/family/caregiver in decisions related to nutrition  Outcome: Progressing

## 2020-03-24 NOTE — DISCHARGE SUMMARY
Discharge- Kateryna Kim 1945, 66 y o  male MRN: [id number]    Unit/Bed#: E4 -01 Encounter: [id number]    Primary Care Provider: No primary care provider on file  Date and time admitted to hospital: 3/20/2020 12:15 AM        * Acute lacunar stroke [facility])  Assessment & Plan  Patient presenting with Left-sided upper and lower extremity weakness which he suddenly experienced when repairing pipe in the basement followed by a fall backwards, denies head trauma  Extensive intracranial disease demonstrated on CTA head - showing severe focal stenosis of proximal left M Segment and severe stenosis of the distal right V4 segment  MRI brain revealed "Acute right thalamic lacunar infarction" not demonstrated on CT head   Patient presented out of window for tPA  Continue ASA/plavix as well as high-intensity statin  Echo unremarkable, preserved EF, grade 1 diastolic dysfunction   Neurology recommendations appreciated  Resume home BP medications  Patient to be discharged to [facility] vs Acute rehab - PMR consult placed, input appreicated      Elevated troponin  Assessment & Plan  Non-MI elevation due to stroke/accelerated HTN   No chest pain       HTN (hypertension)  Assessment & Plan  C/w metoprolol 100mg, lisinopril 40mg      Transition of Care Discharge Summary - [place]  Internal Medicine    Patient Information: Kateryna Kim 66 y o  male MRN: [id number]  Unit/Bed#: E4 -01 Encounter: [id number]    Discharging Physician / Practitioner: Jose Martin Caldwell MD  PCP: No primary care provider on file    Admission Date: 3/20/2020  Discharge Date: 03/25/20    Disposition:      [facility] SN[facility]:   · Not Applicable to this Patient - Not Applicable to this Patient    Reason for Admission: Left sided weakness    Discharge Diagnoses:     Principal Problem:    Acute lacunar stroke [facility])  Active Problems:    HTN (hypertension)    Elevated troponin Hypokalemia  Resolved Problems:    * No resolved hospital problems  *      Consultations During Hospital Stay:  · Neuro    Medication Adjustments and Discharge Medications:  · Medication Dosing Tapers - Please refer to Discharge Medication List for details on any medication dosing tapers (if applicable to patient)  · Discharge Medication List: See after visit summary for reconciled discharge medications  Wound Care Recommendations:  When applicable, please see wound care section of After Visit Summary  Diet Recommendations at Discharge:  Diet -        Diet Orders   (From admission, onward)             Start     Ordered    03/21/20 0846  Diet Regular; Regular House; Sodium 4 GM (JUDITH)  (ED Bridging Orders Panel)  Diet effective now     Question Answer Comment   Diet Type Regular    Regular Regular House    Other Restriction(s): Sodium 4 GM (JUDITH)        03/21/20 0845              Fluid Restriction - No Fluid Restriction at Discharge  Significant Findings / Test Results:     CT Head: negative  CTA:  Severe focal stenosis of proximal left M1 segment, severe stenosis of distal right V4 segment, moderate calcified atherosclerotic disease at bilateral ICA terminus  CXR:  Negative  MRI:  Mild to moderate chronic microvascular ischemic disease, acute right thalamic lacunar infarct    Hospital Course:     Roma Paul is a 66 y o  male patient who originally presented to the hospital on 3/20/2020 due to left-sided weakness  Patient found to have acute right thalamic lacunar infarct, neurology input appreciated, patient otherwise medically stable and will be discharged to rehab      Please see above problem list for further details    Condition at Discharge: good     Discharge Day Visit / Exam:     Subjective:  Patient was seen and examined at bedside, currently denies any complaints    Vitals: Blood Pressure: 146/85 (03/24/20 1257)  Pulse: 66 (03/24/20 1257)  Temperature: 99 1 °F (37 3 °C) (03/24/20 1100)  Temp Source: Temporal (03/24/20 1100)  Respirations: 18 (03/24/20 1100)  Height: 5' 10" (177 8 cm) (03/20/20 0200)  Weight - Scale: 70 4 kg (155 lb 3 3 oz) (03/20/20 0200)  SpO2: 97 % (03/24/20 1100)    Physical Exam:    Constitutional: Patient is oriented to person, place and time, no acute distress  HEENT:  Normocephalic, atraumatic  Cardiovascular: Normal S1S2, RRR, No murmurs/rubs/gallops appreciated  Pulmonary:  Bilateral air entry, No rhonchi/rales/wheezing appreciated  Abdominal: Soft, Bowel sounds present, Non-tender, Non-distended  Extremities:  No cyanosis, clubbing or edema  Neurological: Cranial nerves II-XII grossly intact, motor strength 5/5 in right upper and right lower extremity, motor strength 4/5 in left upper extremity and left lower extremity, dysmetria finger-to-nose on left    Discharge instructions/Information to patient and family:   See after visit summary section titled Discharge Instructions for information provided to patient and family  Planned Readmission: no     Discharge Statement:  I spent 35 minutes discharging the patient  This time was spent on the day of discharge  I had direct contact with the patient on the day of discharge  Greater than 50% of the total time was spent examining patient, answering all patient questions, arranging and discussing plan of care with patient as well as directly providing post-discharge instructions  Additional time then spent on discharge activities      ** Please Note: This note has been constructed using a voice recognition system **

## 2020-03-24 NOTE — SOCIAL WORK
Patient reviewed  Patient needs rehab  Referral accepted to [facility]  Transportation arranged for 2pm pickup with [address] , [phone]  Facility contacts entered in [employer]  Chart copy requested  Facility, RN at bedside, provider and patient aware of arrangements  IMM reviewed and signed by patient  Update:    Patient's transport cancelled  [address] [facility] unable to accept patient today due to high BP reading

## 2020-03-25 ENCOUNTER — HOSPITAL ENCOUNTER (INPATIENT)
Facility: HOSPITAL | Age: 75
LOS: 20 days | Discharge: HOME | DRG: 057 | End: 2020-04-14
Attending: PHYSICAL MEDICINE & REHABILITATION | Admitting: PHYSICAL MEDICINE & REHABILITATION
Payer: COMMERCIAL

## 2020-03-25 VITALS
WEIGHT: 155.2 LBS | DIASTOLIC BLOOD PRESSURE: 70 MMHG | HEART RATE: 85 BPM | TEMPERATURE: 97.7 F | HEIGHT: 70 IN | RESPIRATION RATE: 18 BRPM | SYSTOLIC BLOOD PRESSURE: 165 MMHG | BODY MASS INDEX: 22.22 KG/M2 | OXYGEN SATURATION: 95 %

## 2020-03-25 DIAGNOSIS — I63.81 ACUTE LACUNAR STROKE (HCC): Primary | ICD-10-CM

## 2020-03-25 DIAGNOSIS — I10 HTN (HYPERTENSION): ICD-10-CM

## 2020-03-25 PROCEDURE — 99223 1ST HOSP IP/OBS HIGH 75: CPT | Performed by: PHYSICAL MEDICINE & REHABILITATION

## 2020-03-25 PROCEDURE — 97530 THERAPEUTIC ACTIVITIES: CPT

## 2020-03-25 PROCEDURE — 97535 SELF CARE MNGMENT TRAINING: CPT

## 2020-03-25 PROCEDURE — 99239 HOSP IP/OBS DSCHRG MGMT >30: CPT | Performed by: INTERNAL MEDICINE

## 2020-03-25 PROCEDURE — 97116 GAIT TRAINING THERAPY: CPT

## 2020-03-25 PROCEDURE — 97110 THERAPEUTIC EXERCISES: CPT

## 2020-03-25 RX ORDER — ONDANSETRON 2 MG/ML
4 INJECTION INTRAMUSCULAR; INTRAVENOUS EVERY 6 HOURS PRN
Status: DISCONTINUED | OUTPATIENT
Start: 2020-03-25 | End: 2020-03-26

## 2020-03-25 RX ORDER — ATORVASTATIN CALCIUM 80 MG/1
80 TABLET, FILM COATED ORAL
Status: DISCONTINUED | OUTPATIENT
Start: 2020-03-25 | End: 2020-04-14 | Stop reason: HOSPADM

## 2020-03-25 RX ORDER — CLOPIDOGREL BISULFATE 75 MG/1
75 TABLET ORAL DAILY
Status: DISCONTINUED | OUTPATIENT
Start: 2020-03-26 | End: 2020-04-14 | Stop reason: HOSPADM

## 2020-03-25 RX ORDER — CALCIUM CARBONATE 200(500)MG
1000 TABLET,CHEWABLE ORAL DAILY PRN
Status: DISCONTINUED | OUTPATIENT
Start: 2020-03-25 | End: 2020-04-14 | Stop reason: HOSPADM

## 2020-03-25 RX ORDER — ACETAMINOPHEN 325 MG/1
650 TABLET ORAL EVERY 4 HOURS PRN
Status: DISCONTINUED | OUTPATIENT
Start: 2020-03-25 | End: 2020-04-14 | Stop reason: HOSPADM

## 2020-03-25 RX ORDER — HYDROCHLOROTHIAZIDE 25 MG/1
25 TABLET ORAL DAILY
Status: DISCONTINUED | OUTPATIENT
Start: 2020-03-26 | End: 2020-03-27

## 2020-03-25 RX ORDER — HYDRALAZINE HYDROCHLORIDE 20 MG/ML
10 INJECTION INTRAMUSCULAR; INTRAVENOUS EVERY 4 HOURS PRN
Status: DISCONTINUED | OUTPATIENT
Start: 2020-03-25 | End: 2020-03-25

## 2020-03-25 RX ORDER — METOPROLOL SUCCINATE 50 MG/1
100 TABLET, EXTENDED RELEASE ORAL DAILY
Status: DISCONTINUED | OUTPATIENT
Start: 2020-03-26 | End: 2020-04-10

## 2020-03-25 RX ORDER — ASPIRIN 81 MG/1
81 TABLET ORAL DAILY
Status: DISCONTINUED | OUTPATIENT
Start: 2020-03-26 | End: 2020-04-14 | Stop reason: HOSPADM

## 2020-03-25 RX ORDER — LISINOPRIL 20 MG/1
40 TABLET ORAL DAILY
Status: DISCONTINUED | OUTPATIENT
Start: 2020-03-26 | End: 2020-04-14 | Stop reason: HOSPADM

## 2020-03-25 RX ORDER — LABETALOL 20 MG/4 ML (5 MG/ML) INTRAVENOUS SYRINGE
10 EVERY 4 HOURS PRN
Status: DISCONTINUED | OUTPATIENT
Start: 2020-03-25 | End: 2020-03-25

## 2020-03-25 RX ORDER — HYDROCHLOROTHIAZIDE 25 MG/1
25 TABLET ORAL DAILY
Status: CANCELLED | OUTPATIENT
Start: 2020-03-26

## 2020-03-25 RX ADMIN — ATORVASTATIN CALCIUM 80 MG: 80 TABLET, FILM COATED ORAL at 16:55

## 2020-03-25 RX ADMIN — LISINOPRIL 40 MG: 20 TABLET ORAL at 09:13

## 2020-03-25 RX ADMIN — METOPROLOL SUCCINATE 100 MG: 50 TABLET, EXTENDED RELEASE ORAL at 09:13

## 2020-03-25 RX ADMIN — ONDANSETRON 4 MG: 2 INJECTION INTRAMUSCULAR; INTRAVENOUS at 14:20

## 2020-03-25 RX ADMIN — HYDROCHLOROTHIAZIDE 25 MG: 25 TABLET ORAL at 09:13

## 2020-03-25 RX ADMIN — ENOXAPARIN SODIUM 40 MG: 40 INJECTION SUBCUTANEOUS at 09:13

## 2020-03-25 RX ADMIN — ASPIRIN 81 MG: 81 TABLET, COATED ORAL at 09:13

## 2020-03-25 RX ADMIN — CLOPIDOGREL BISULFATE 75 MG: 75 TABLET ORAL at 09:13

## 2020-03-25 NOTE — PROGRESS NOTES
Progress Note - Brinda Sutton 1945, 66 y o  male MRN: [id number]    Unit/Bed#: E4 -01 Encounter: [id number]    Primary Care Provider: No primary care provider on file  Date and time admitted to hospital: 3/20/2020 12:15 AM        * Acute lacunar stroke [facility])  Assessment & Plan  Patient presenting with Left-sided upper and lower extremity weakness which he suddenly experienced when repairing pipe in the basement followed by a fall backwards, denies head trauma  Extensive intracranial disease demonstrated on CTA head - showing severe focal stenosis of proximal left M Segment and severe stenosis of the distal right V4 segment  MRI brain revealed "Acute right thalamic lacunar infarction" not demonstrated on CT head   Patient presented out of window for tPA  Continue ASA/plavix as well as high-intensity statin  Echo unremarkable, preserved EF, grade 1 diastolic dysfunction   Neurology recommendations appreciated  Resume home BP medications  Patient to be discharged to [facility] vs Acute rehab - PMR consult placed, input appreicated    Elevated troponin  Assessment & Plan  Non-MI elevation due to stroke/accelerated HTN   No chest pain       HTN (hypertension)  Assessment & Plan  Patient initially presented with accelerated hypertension - antihypertensives were held for permissive hypertension  Resume home regimen, BP markedly elevated and may need an additional medication            VTE Pharmacologic Prophylaxis:   Pharmacologic: lovenox    Patient Centered Rounds: I have performed bedside rounds with nursing staff today  Time Spent for Care: 20 minutes  More than 50% of total time spent on counseling and coordination of care as described above      Current Length of Stay: 5 day(s)    Current Patient Status: Inpatient   Certification Statement: The patient will continue to require additional inpatient hospital stay due to rehab placement    Discharge Plan / Estimated Discharge Date: 24-48 hours    Code Status: Level 1 - Full Code      Subjective:   Patient seen and examined at bedside, currently denies any complaints    Objective:     Vitals:   Temp (24hrs), Av 4 °F (36 9 °C), Min:97 7 °F (36 5 °C), Max:99 6 °F (37 6 °C)    Temp:  [97 7 °F (36 5 °C)-99 6 °F (37 6 °C)] 97 7 °F (36 5 °C)  HR:  [63-85] 85  Resp:  [18] 18  BP: (122-222)/(68-90) 165/70  SpO2:  [95 %-98 %] 95 %  Body mass index is 22 27 kg/m²  Input and Output Summary (last 24 hours): Intake/Output Summary (Last 24 hours) at 3/25/2020 1449  Last data filed at 3/25/2020 0701  Gross per 24 hour   Intake    Output 400 ml   Net -400 ml       Physical Exam:    Constitutional: Patient is oriented to person, place and time, no acute distress  HEENT:  Normocephalic, atraumatic  Cardiovascular: Normal S1S2, RRR, No murmurs/rubs/gallops appreciated  Pulmonary:  Bilateral air entry, No rhonchi/rales/wheezing appreciated  Abdominal: Soft, Bowel sounds present, Non-tender, Non-distended  Extremities:  No cyanosis, clubbing or edema  Neurological: Cranial nerves II-XII grossly intact, motor strength 5/5 in RUE and RLE, motor strength 4/5 in LUE and LLE, dysmetria of finger to nose on left    Additional Data:     Labs:    Results from last 7 days   Lab Units 20  0514   WBC Thousand/uL 8 72   HEMOGLOBIN g/dL 13 4   HEMATOCRIT % 41 5   PLATELETS Thousands/uL 232   NEUTROS PCT % 64   LYMPHS PCT % 22   MONOS PCT % 11   EOS PCT % 2     Results from last 7 days   Lab Units 20  0514   POTASSIUM mmol/L 3 6   CHLORIDE mmol/L 103   CO2 mmol/L 27   BUN mg/dL 12   CREATININE mg/dL 0 77   CALCIUM mg/dL 9 5     Results from last 7 days   Lab Units 20  0026   INR  1 02        I Have Reviewed All Lab Data Listed Above          Recent Cultures (last 7 days):           Last 24 Hours Medication List:     Current Facility-Administered Medications:  acetaminophen 650 mg Oral Q4H PRN Bita Cole PA-C   aspirin 81 mg Oral Daily MARTINEZ DavilaC atorvastatin 80 mg Oral Daily With Vamsi Graves MD   calcium carbonate 1,000 mg Oral Daily PRN Adrianna File, ALBERT   clopidogrel 75 mg Oral Daily Seema Ford, ALBERT   enoxaparin 40 mg Subcutaneous Daily Adrianna File, ALBERT   hydrALAZINE 10 mg Intravenous Q4H PRN Ivon Davalos MD   hydrochlorothiazide 25 mg Oral Daily Trinity Strinegr MD   iohexol 85 mL Intravenous Once in imaging Idalia Chilel DO   Labetalol HCl 10 mg Intravenous Q4H PRN Mike Paul MD   lisinopril 40 mg Oral Daily Mike Paul MD   metoprolol succinate 100 mg Oral Daily Mike Paul MD   ondansetron 4 mg Intravenous Q6H PRN Adrianna File, ALBERT        Today, Patient Was Seen By: Trinity Stringer MD

## 2020-03-25 NOTE — PLAN OF CARE
Problem: Potential for Falls  Goal: Patient will remain free of falls  Description  INTERVENTIONS:  - Assess patient frequently for physical needs  -  Identify cognitive and physical deficits and behaviors that affect risk of falls  -  Tangent fall precautions as indicated by assessment   - Educate patient/family on patient safety including physical limitations  - Instruct patient to call for assistance with activity based on assessment  - Modify environment to reduce risk of injury  - Consider OT/PT consult to assist with strengthening/mobility   Outcome: Progressing     Problem: Neurological Deficit  Goal: Neurological status is stable or improving  Description  Interventions:  - Monitor and assess patient's level of consciousness, motor function, sensory function, and level of assistance needed for ADLs  - Monitor and report changes from baseline  Collaborate with interdisciplinary team to initiate plan and implement interventions as ordered  - Provide and maintain a safe environment  - Consider seizure precautions  - Consider fall precautions  - Consider aspiration precautions  - Consider bleeding precautions  Outcome: Progressing     Problem: Activity Intolerance/Impaired Mobility  Goal: Mobility/activity is maintained at optimum level for patient  Description  Interventions:  - Assess and monitor patient  barriers to mobility and need for assistive/adaptive devices  - Assess patient's emotional response to limitations  - Collaborate with interdisciplinary team and initiate plans and interventions as ordered  - Encourage independent activity per ability   - Maintain proper body alignment  - Perform active/passive rom as tolerated/ordered    - Plan activities to conserve energy   - Turn patient as appropriate   Outcome: Progressing     Problem: Communication Impairment  Goal: Ability to express needs and understand communication  Description  Assess patient's communication skills and ability to understand information  Patient will demonstrate use of effective communication techniques, alternative methods of communication and understanding even if not able to speak  - Encourage communication and provide alternate methods of communication as needed  - Collaborate with case management/ for discharge needs  - Include patient/family/caregiver in decisions related to communication  Outcome: Progressing     Problem: Potential for Aspiration  Goal: Non-ventilated patient's risk of aspiration is minimized  Description  Assess and monitor vital signs, respiratory status, and labs (WBC)  Monitor for signs of aspiration (tachypnea, cough, rales, wheezing, cyanosis, fever)  - Assess and monitor patient's ability to swallow  - Place patient up in chair to eat if possible  - HOB up at 90 degrees to eat if unable to get patient up into chair   - Supervise patient during oral intake  - Instruct patient/ family to take small bites  - Instruct patient/ family to take small single sips when taking liquids  - Follow patient-specific strategies generated by speech pathologist    Outcome: Progressing     Problem: NEUROSENSORY - ADULT  Goal: Achieves stable or improved neurological status  Description  INTERVENTIONS  - Monitor and report changes in neurological status  - Monitor vital signs such as temperature, blood pressure, glucose, and any other labs ordered   - Initiate measures to prevent increased intracranial pressure  - Monitor for seizure activity and implement precautions if appropriate       Outcome: Progressing  Goal: Achieves maximal functionality and self care  Description  INTERVENTIONS  - Monitor swallowing and airway patency with patient fatigue and changes in neurological status  - Encourage and assist patient to increase activity and self care     - Encourage visually impaired, hearing impaired and aphasic patients to use assistive/communication devices   Outcome: Progressing Problem: CARDIOVASCULAR - ADULT  Goal: Absence of cardiac dysrhythmias or at baseline rhythm  Description  INTERVENTIONS:  - Continuous cardiac monitoring, vital signs, obtain 12 lead EKG if ordered  - Administer antiarrhythmic and heart rate control medications as ordered  - Monitor electrolytes and administer replacement therapy as ordered   Outcome: Progressing     Problem: GASTROINTESTINAL - ADULT  Goal: Minimal or absence of nausea and/or vomiting  Description  INTERVENTIONS:  - Administer IV fluids if ordered to ensure adequate hydration  - Maintain NPO status until nausea and vomiting are resolved  - Nasogastric tube if ordered  - Administer ordered antiemetic medications as needed  - Provide nonpharmacologic comfort measures as appropriate  - Advance diet as tolerated, if ordered  - Consider nutrition services referral to assist patient with adequate nutrition and appropriate food choices   Outcome: Progressing     Problem: METABOLIC, FLUID AND ELECTROLYTES - ADULT  Goal: Glucose maintained within target range  Description  INTERVENTIONS:  - Monitor Blood Glucose as ordered  - Assess for signs and symptoms of hyperglycemia and hypoglycemia  - Administer ordered medications to maintain glucose within target range  - Assess nutritional intake and initiate nutrition service referral as needed   Outcome: Progressing     Problem: PAIN - ADULT  Goal: Verbalizes/displays adequate comfort level or baseline comfort level  Description  Interventions:  - Encourage patient to monitor pain and request assistance  - Assess pain using appropriate pain scale  - Administer analgesics based on type and severity of pain and evaluate response  - Implement non-pharmacological measures as appropriate and evaluate response  - Consider cultural and social influences on pain and pain management  - Notify physician/advanced practitioner if interventions unsuccessful or patient reports new pain   Outcome: Progressing     Problem: DISCHARGE PLANNING  Goal: Discharge to home or other facility with appropriate resources  Description  INTERVENTIONS:  - Identify barriers to discharge w/patient and caregiver  - Arrange for needed discharge resources and transportation as appropriate  - Identify discharge learning needs (meds, wound care, etc )  - Arrange for interpretive services to assist at discharge as needed  - Refer to Case Management Department for coordinating discharge planning if the patient needs post-hospital services based on physician/advanced practitioner order or complex needs related to functional status, cognitive ability, or social support system   Outcome: Progressing     Problem: Knowledge Deficit  Goal: Patient/family/caregiver demonstrates understanding of disease process, treatment plan, medications, and discharge instructions  Description  Complete learning assessment and assess knowledge base  Interventions:  - Provide teaching at level of understanding  - Provide teaching via preferred learning methods   Outcome: Progressing     Problem: Nutrition/Hydration-ADULT  Goal: Nutrient/Hydration intake appropriate for improving, restoring or maintaining nutritional needs  Description  Monitor and assess patient's nutrition/hydration status for malnutrition  Collaborate with interdisciplinary team and initiate plan and interventions as ordered  Monitor patient's weight and dietary intake as ordered or per policy  Utilize nutrition screening tool and intervene as necessary  Determine patient's food preferences and provide high-protein, high-caloric foods as appropriate       INTERVENTIONS:  - Monitor oral intake, urinary output, labs, and treatment plans  - Assess nutrition and hydration status and recommend course of action  - Evaluate amount of meals eaten  - Assist patient with eating if necessary   - Allow adequate time for meals  - Recommend/ encourage appropriate diets, oral nutritional supplements, and vitamin/mineral supplements  - Order, calculate, and assess calorie counts as needed  - Recommend, monitor, and adjust tube feedings and TPN/PPN based on assessed needs  - Assess need for intravenous fluids  - Provide specific nutrition/hydration education as appropriate  - Include patient/family/caregiver in decisions related to nutrition   Outcome: Progressing     Problem: Prexisting or High Potential for Compromised Skin Integrity  Goal: Skin integrity is maintained or improved  Description  INTERVENTIONS:  - Identify patients at risk for skin breakdown  - Assess and monitor skin integrity  - Assess and monitor nutrition and hydration status  - Monitor labs   - Assess for incontinence   - Turn and reposition patient  - Assist with mobility/ambulation  - Relieve pressure over bony prominences  - Avoid friction and shearing  - Provide appropriate hygiene as needed including keeping skin clean and dry  - Evaluate need for skin moisturizer/barrier cream  - Collaborate with interdisciplinary team   - Patient/family teaching  - Consider wound care consult    Outcome: Progressing

## 2020-03-25 NOTE — SOCIAL WORK
Pt stable to discharge to [facility] today for STR    CM rearranged wcv with [facility] for 3PM     Report: [phone]

## 2020-03-25 NOTE — OCCUPATIONAL THERAPY NOTE
Occupational Therapy Treatment Note:         03/25/20 0856   Restrictions/Precautions   Weight Bearing Precautions Per Order No   Other Precautions Fall Risk;Pain; Chair Alarm; Bed Alarm;Multiple lines   Pain Assessment   Pain Assessment Tool 0-10   Pain Score No Pain   ADL   Where Assessed Edge of bed   Eating Assistance 5  Supervision/Setup   Eating Deficit Setup   Grooming Assistance 4  Minimal Assistance   Grooming Deficit Setup;Supervision/safety;Verbal cueing; Increased time to complete;Wash/dry face; Wash/dry hands;Brushing hair   Grooming Comments Pt with limited sitting balance  Improvement noted once stabilized  UB Bathing Assistance 4  Minimal Assistance   UB Bathing Deficit Setup;Verbal cueing;Supervision/safety; Increased time to complete;Left arm   UB Bathing Comments cues for gross useage of LUE noted this tx session  LB Bathing Assistance 3  Moderate Assistance   LB Bathing Deficit Setup;Steadying;Verbal cueing;Supervision/safety; Increased time to complete; Buttocks; Perineal area;Right lower leg including foot; Left lower leg including foot   LB Bathing Comments limited balance with functional reach  Improvement noted with cues to slow pace with actviity  UB Dressing Assistance 4  Minimal Assistance   UB Dressing Deficit Setup;Supervision/safety;Verbal cueing; Increased time to complete; Thread LUE; Thread RUE   UB Dressing Comments one handed dressing techs reviewed  Further review will be required with application of standard shirts/over sized T-shirts  LB Dressing Assistance 3  Moderate Assistance   LB Dressing Deficit Steadying;Setup; Requires assistive device for steadying;Supervision/safety;Verbal cueing; Increased time to complete; Don/doff R sock; Don/doff L sock   LB Dressing Comments Pt able to initiate bilateral grasp with extended time provided and cues to fully open L hand to don/doff socks  Toileting Assistance  Unable to assess   Functional Standing Tolerance   Time 4 mins  Activity dynamic stand balance activity  Comments Pt with improved stand tolerance with functional mobility with Mod A x1 with support of RW and cues for appropriate grasp with L hand  Bed Mobility   Supine to Sit 4  Minimal assistance   Additional items Assist x 1;Bedrails;HOB elevated; Increased time required;Verbal cues;LE management   Additional Comments Pt required cues for safety due to limited sitting balance noted  Transfers   Sit to Stand 3  Moderate assistance   Additional items Assist x 1; Armrests; Bedrails;HOB elevated; Increased time required;Verbal cues   Stand to Sit 3  Moderate assistance   Additional items Assist x 1;Bedrails;Armrests; Increased time required;Verbal cues   Stand pivot 3  Moderate assistance   Additional items Assist x 1; Armrests; Bedrails; Increased time required;Verbal cues   Toilet transfer Unable to assess   Additional Comments cues for appropriate hand placement and footing with use of RW required  Functional Mobility   Functional Mobility 3  Moderate assistance   Additional Comments x1   Additional items Rolling walker   Therapeutic Exercise - ROM   UE-ROM Yes   ROM - Left Upper Extremities    L Wrist AAROM   L Hand AAROM;Prolonged stretch; Thumb; Long finger; Index finger;Ring finger;Little finger   L Position Seated   L Weight/Reps/Sets 3 sets of 5 reps  LUE ROM Comment Pt able to complete AROM following review with A with extended time provided  No further need for redirection    Cognition   Overall Cognitive Status Impaired   Arousal/Participation Responsive; Cooperative; Alert   Attention Attends with cues to redirect   Orientation Level Oriented X4   Memory Decreased recall of precautions   Following Commands Follows multistep commands with increased time or repetition   Comments cues for appropriate techs required for propriate use of RW and to encourage functional grasp with use of L hand      Additional Activities   Additional Activities Other (Comment)  (reviewed current plan of care  )   Additional Activities Comments Pt with better understanding and encouraged to participate with continued rehab  Activity Tolerance   Activity Tolerance Patient limited by fatigue   Medical Staff Made Aware reported all findings to nursing staff  Assessment   Assessment Pt was seen for skilled OT with focus on completion of self care routine, functional transfers, review of LUE/hand, wrist ROM exercises, review of sitting balance with weight bearing into LUE and review of current plan of care  See above levels of A required for all functional tasks  Pt required hands on A for safe sitting balance with initial EOB positioning  Improvement noted once stabilized with WB into affected LUE  ADL routine completed as follows  Feeding: SBA with appropriate set up and LUE in visual field due to noted L sided neglect  Grooming: Min A UB Bathing: Min A with use of one handed dressing techs  UB Dressing: Min A LB Bathing: Mod A LB Dressing: Mod A Toileting: unable to assess  Bed Mobility: Min A x1 Transfers: Mod A x1  Pt pleasant and able to follow multistep commands  Pt eager to continue therapies to encourage optimal outcome due to current dx  Recommending continued therapies due to noted deficits and gains recently noted with function to promote optimal performance levels with all functional tasks  Plan   Treatment Interventions ADL retraining;Functional transfer training;UE strengthening/ROM; Endurance training   Goal Expiration Date 04/03/20   OT Treatment Day 4   OT Frequency 3-5x/wk   Recommendation   OT Discharge Recommendation Short Term Rehab   OT - OK to Discharge Yes  (when medically cleared  )   Barthel Index   Feeding 5   Bathing 0   Grooming Score 0   Dressing Score 5   Bladder Score 10   Bowels Score 10   Toilet Use Score 5   Transfers (Bed/Chair) Score 5   Mobility (Level Surface) Score 0   Stairs Score 0   Barthel Index Score 40   Modified Grayson Scale   Modified Debbie Scale 4 ANA LUISA Knutson

## 2020-03-25 NOTE — PLAN OF CARE
Problem: Potential for Falls  Goal: Patient will remain free of falls  Description  INTERVENTIONS:  - Assess patient frequently for physical needs  -  Identify cognitive and physical deficits and behaviors that affect risk of falls  -  [place] fall precautions as indicated by assessment   - Educate patient/family on patient safety including physical limitations  - Instruct patient to call for assistance with activity based on assessment  - Modify environment to reduce risk of injury  - Consider OT/PT consult to assist with strengthening/mobility   Outcome: Progressing     Problem: Neurological Deficit  Goal: Neurological status is stable or improving  Description  Interventions:  - Monitor and assess patient's level of consciousness, motor function, sensory function, and level of assistance needed for ADLs  - Monitor and report changes from baseline  Collaborate with interdisciplinary team to initiate plan and implement interventions as ordered  - Provide and maintain a safe environment  - Consider seizure precautions  - Consider fall precautions  - Consider aspiration precautions  - Consider bleeding precautions  Outcome: Progressing     Problem: Activity Intolerance/Impaired Mobility  Goal: Mobility/activity is maintained at optimum level for patient  Description  Interventions:  - Assess and monitor patient  barriers to mobility and need for assistive/adaptive devices  - Assess patient's emotional response to limitations  - Collaborate with interdisciplinary team and initiate plans and interventions as ordered  - Encourage independent activity per ability   - Maintain proper body alignment  - Perform active/passive rom as tolerated/ordered    - Plan activities to conserve energy   - Turn patient as appropriate   Outcome: Progressing     Problem: Communication Impairment  Goal: Ability to express needs and understand communication  Description  Assess patient's communication skills and ability to understand information  Patient will demonstrate use of effective communication techniques, alternative methods of communication and understanding even if not able to speak  - Encourage communication and provide alternate methods of communication as needed  - Collaborate with case management/ for discharge needs  - Include patient/family/caregiver in decisions related to communication  Outcome: Progressing     Problem: Potential for Aspiration  Goal: Non-ventilated patient's risk of aspiration is minimized  Description  Assess and monitor vital signs, respiratory status, and labs (WBC)  Monitor for signs of aspiration (tachypnea, cough, rales, wheezing, cyanosis, fever)  - Assess and monitor patient's ability to swallow  - Place patient up in chair to eat if possible  - HOB up at 90 degrees to eat if unable to get patient up into chair   - Supervise patient during oral intake  - Instruct patient/ family to take small bites  - Instruct patient/ family to take small single sips when taking liquids  - Follow patient-specific strategies generated by speech pathologist    Outcome: Progressing     Problem: NEUROSENSORY - ADULT  Goal: Achieves stable or improved neurological status  Description  INTERVENTIONS  - Monitor and report changes in neurological status  - Monitor vital signs such as temperature, blood pressure, glucose, and any other labs ordered   - Initiate measures to prevent increased intracranial pressure  - Monitor for seizure activity and implement precautions if appropriate       Outcome: Progressing  Goal: Achieves maximal functionality and self care  Description  INTERVENTIONS  - Monitor swallowing and airway patency with patient fatigue and changes in neurological status  - Encourage and assist patient to increase activity and self care     - Encourage visually impaired, hearing impaired and aphasic patients to use assistive/communication devices   Outcome: Progressing Problem: CARDIOVASCULAR - ADULT  Goal: Absence of cardiac dysrhythmias or at baseline rhythm  Description  INTERVENTIONS:  - Continuous cardiac monitoring, vital signs, obtain 12 lead EKG if ordered  - Administer antiarrhythmic and heart rate control medications as ordered  - Monitor electrolytes and administer replacement therapy as ordered   Outcome: Progressing     Problem: GASTROINTESTINAL - ADULT  Goal: Minimal or absence of nausea and/or vomiting  Description  INTERVENTIONS:  - Administer IV fluids if ordered to ensure adequate hydration  - Maintain NPO status until nausea and vomiting are resolved  - Nasogastric tube if ordered  - Administer ordered antiemetic medications as needed  - Provide nonpharmacologic comfort measures as appropriate  - Advance diet as tolerated, if ordered  - Consider nutrition services referral to assist patient with adequate nutrition and appropriate food choices   Outcome: Progressing     Problem: METABOLIC, FLUID AND ELECTROLYTES - ADULT  Goal: Glucose maintained within target range  Description  INTERVENTIONS:  - Monitor Blood Glucose as ordered  - Assess for signs and symptoms of hyperglycemia and hypoglycemia  - Administer ordered medications to maintain glucose within target range  - Assess nutritional intake and initiate nutrition service referral as needed   Outcome: Progressing     Problem: PAIN - ADULT  Goal: Verbalizes/displays adequate comfort level or baseline comfort level  Description  Interventions:  - Encourage patient to monitor pain and request assistance  - Assess pain using appropriate pain scale  - Administer analgesics based on type and severity of pain and evaluate response  - Implement non-pharmacological measures as appropriate and evaluate response  - Consider cultural and social influences on pain and pain management  - Notify physician/advanced practitioner if interventions unsuccessful or patient reports new pain   Outcome: Progressing     Problem: DISCHARGE PLANNING  Goal: Discharge to home or other facility with appropriate resources  Description  INTERVENTIONS:  - Identify barriers to discharge w/patient and caregiver  - Arrange for needed discharge resources and transportation as appropriate  - Identify discharge learning needs (meds, wound care, etc )  - Arrange for interpretive services to assist at discharge as needed  - Refer to Case Management Department for coordinating discharge planning if the patient needs post-hospital services based on physician/advanced practitioner order or complex needs related to functional status, cognitive ability, or social support system   Outcome: Progressing     Problem: Knowledge Deficit  Goal: Patient/family/caregiver demonstrates understanding of disease process, treatment plan, medications, and discharge instructions  Description  Complete learning assessment and assess knowledge base  Interventions:  - Provide teaching at level of understanding  - Provide teaching via preferred learning methods   Outcome: Progressing     Problem: Nutrition/Hydration-ADULT  Goal: Nutrient/Hydration intake appropriate for improving, restoring or maintaining nutritional needs  Description  Monitor and assess patient's nutrition/hydration status for malnutrition  Collaborate with interdisciplinary team and initiate plan and interventions as ordered  Monitor patient's weight and dietary intake as ordered or per policy  Utilize nutrition screening tool and intervene as necessary  Determine patient's food preferences and provide high-protein, high-caloric foods as appropriate       INTERVENTIONS:  - Monitor oral intake, urinary output, labs, and treatment plans  - Assess nutrition and hydration status and recommend course of action  - Evaluate amount of meals eaten  - Assist patient with eating if necessary   - Allow adequate time for meals  - Recommend/ encourage appropriate diets, oral nutritional supplements, and vitamin/mineral supplements  - Order, calculate, and assess calorie counts as needed  - Recommend, monitor, and adjust tube feedings and TPN/PPN based on assessed needs  - Assess need for intravenous fluids  - Provide specific nutrition/hydration education as appropriate  - Include patient/family/caregiver in decisions related to nutrition   Outcome: Progressing     Problem: Prexisting or High Potential for Compromised Skin Integrity  Goal: Skin integrity is maintained or improved  Description  INTERVENTIONS:  - Identify patients at risk for skin breakdown  - Assess and monitor skin integrity  - Assess and monitor nutrition and hydration status  - Monitor labs   - Assess for incontinence   - Turn and reposition patient  - Assist with mobility/ambulation  - Relieve pressure over bony prominences  - Avoid friction and shearing  - Provide appropriate hygiene as needed including keeping skin clean and dry  - Evaluate need for skin moisturizer/barrier cream  - Collaborate with interdisciplinary team   - Patient/family teaching  - Consider wound care consult    Outcome: Progressing

## 2020-03-25 NOTE — PHYSICAL THERAPY NOTE
Physical Therapy Treatment Note       03/25/20 0900   Pain Assessment   Pain Assessment Tool Pain Assessment not indicated - pt denies pain   Restrictions/Precautions   Other Precautions Chair Alarm; Bed Alarm; Fall Risk   General   Chart Reviewed Yes   Family/Caregiver Present No   Subjective   Subjective " It will get better "    Bed Mobility   Supine to Sit 4  Minimal assistance   Additional items Assist x 1; Increased time required;Verbal cues;HOB elevated   Transfers   Sit to Stand 4  Minimal assistance   Additional items Assist x 2; Increased time required;Verbal cues;Armrests   Stand to Sit 4  Minimal assistance   Additional items Assist x 2;Armrests; Increased time required;Verbal cues   Additional Comments verbal cues for handplacement   Ambulation/Elevation   Gait pattern L Hemiparesis; Poor UE support; Improper Weight shift;Decreased foot clearance; Forward Flexion; Ataxia; Short stride; Inconsistent wesley; Step to;Excessively slow   Gait Assistance 3  Moderate assist   Additional items Assist x 1; Tactile cues; Verbal cues   Assistive Device Rolling walker   Distance 40' x2   Balance   Static Sitting Fair   Dynamic Sitting Fair -   Static Standing Poor +   Dynamic Standing Poor +   Ambulatory Poor   Endurance Deficit   Endurance Deficit Yes   Endurance Deficit Description fatigue, weakness  Activity Tolerance   Activity Tolerance Patient tolerated treatment well;Patient limited by fatigue   Medical Staff Made Aware Altagracia russell   Exercises   Hip Abduction Sitting;10 reps;AROM; Bilateral   Knee AROM Long Arc Quad Sitting;10 reps;AROM; Bilateral   Ankle Pumps Sitting;15 reps;AROM; Bilateral   Marching Standing;10 reps;AROM; Bilateral   Balance training  seated static dyanmic performed while seated at edge of bed x 15 minutes with cg- min assit x1-2 for safety  pt demonstrates L side lean requiring min assist at times to correct with max cues       Assessment   Prognosis Good   Problem List Decreased strength;Decreased endurance; Impaired balance;Decreased mobility; Decreased coordination;Decreased cognition;Decreased safety awareness; Impaired judgement; Impaired sensation   Assessment Pt seen for PT interventions as per PT POC  Pt continues to demonstrates l sided lean with dynamic sitting balance activities and upon seated upright position at edge of bed  Requiring verbal and tactile cues and min assist at times to correct  Pt  Demonstrating improved L  on walker NO assist required to maintain L  or for repositioning verbal reminders required only  Verbal cues for improved upright posture  Less assistance for sit to stand requiring min assist x2 and verbal cues for R handplacement required during sit to stand transfers, pt progressed with ambulation distances to 36' x2 with mod assist x1 standby assist of another for safety and cues to move over as pt veering toward the L  Pt continues to demonstrate ataxia of L le and NBOS , decreased l foot clearance, mild L lean and slower gait as pt becomes fatigued  Pt performed seated and standing b/l le arom exercises and weightshifting in standing  Pt Requires verbal cues to perform exercises slowly for slow controlled movements  L ue ataxia noted with reaching activities  Pt remained seated out of bed in recliner with SCD's to b/l le's and chair alarm activated  Continue to recommend STR at d/c  Goals   Patient Goals " to get better "    STG Expiration Date 04/03/20   PT Treatment Day 3   Plan   Treatment/Interventions Functional transfer training;LE strengthening/ROM; Therapeutic exercise; Endurance training;Patient/family training;Equipment eval/education; Bed mobility;Gait training;Spoke to nursing;OT   Progress Progressing toward goals   PT Frequency   (5-6x/week)   Recommendation   Recommendation Short-term skilled PT   PT - OK to Discharge Yes        03/25/20 0900   Pain Assessment   Pain Assessment Tool Pain Assessment not indicated - pt denies pain   Restrictions/Precautions   Other Precautions Chair Alarm; Bed Alarm; Fall Risk   General   Chart Reviewed Yes   Family/Caregiver Present No   Subjective   Subjective " It will get better "    Bed Mobility   Supine to Sit 4  Minimal assistance   Additional items Assist x 1; Increased time required;Verbal cues;HOB elevated   Transfers   Sit to Stand 4  Minimal assistance   Additional items Assist x 2; Increased time required;Verbal cues;Armrests   Stand to Sit 4  Minimal assistance   Additional items Assist x 2;Armrests; Increased time required;Verbal cues   Additional Comments verbal cues for handplacement   Ambulation/Elevation   Gait pattern L Hemiparesis; Poor UE support; Improper Weight shift;Decreased foot clearance; Forward Flexion; Ataxia; Short stride; Inconsistent wesley; Step to;Excessively slow   Gait Assistance 3  Moderate assist   Additional items Assist x 1; Tactile cues; Verbal cues   Assistive Device Rolling walker   Distance 40' x2   Balance   Static Sitting Fair   Dynamic Sitting Fair -   Static Standing Poor +   Dynamic Standing Poor +   Ambulatory Poor   Endurance Deficit   Endurance Deficit Yes   Endurance Deficit Description fatigue, weakness  Activity Tolerance   Activity Tolerance Patient tolerated treatment well;Patient limited by fatigue   Medical Staff Made Aware Altagracia russell   Exercises   Hip Abduction Sitting;10 reps;AROM; Bilateral   Knee AROM Long Arc Quad Sitting;10 reps;AROM; Bilateral   Ankle Pumps Sitting;15 reps;AROM; Bilateral   Marching Standing;10 reps;AROM; Bilateral   Balance training  seated static dyanmic performed while seated at edge of bed x 15 minutes with cg- min assit x1-2 for safety  pt demonstrates L side lean requiring min assist at times to correct with max cues  Assessment   Prognosis Good   Problem List Decreased strength;Decreased endurance; Impaired balance;Decreased mobility; Decreased coordination;Decreased cognition;Decreased safety awareness; Impaired judgement; Impaired sensation   Assessment Pt seen for PT interventions as per PT POC  Pt continues to demonstrates l sided lean with dynamic sitting balance activities and upon seated upright position at edge of bed  Requiring verbal and tactile cues and min assist at times to correct  Pt  Demonstrating improved L  on walker NO assist required to maintain L  or for repositioning verbal reminders required only  Verbal cues for improved upright posture  Less assistance for sit to stand requiring min assist x2 and verbal cues for R handplacement required during sit to stand transfers, pt progressed with ambulation distances to 36' x2 with mod assist x1 standby assist of another for safety and cues to move over as pt veering toward the L  Pt continues to demonstrate ataxia of L le and NBOS , decreased l foot clearance, mild L lean and slower gait as pt becomes fatigued  Pt performed seated and standing b/l le arom exercises and weightshifting in standing  Pt Requires verbal cues to perform exercises slowly for slow controlled movements  L ue ataxia noted with reaching activities  Pt remained seated out of bed in recliner with SCD's to b/l le's and chair alarm activated  Continue to recommend STR at d/c  Goals   Patient Goals " to get better "    STG Expiration Date 04/03/20   PT Treatment Day 3   Plan   Treatment/Interventions Functional transfer training;LE strengthening/ROM; Therapeutic exercise; Endurance training;Patient/family training;Equipment eval/education; Bed mobility;Gait training;Spoke to nursing;OT   Progress Progressing toward goals   PT Frequency   (5-6x/week)   Recommendation   Recommendation Short-term skilled PT   PT - OK to Discharge Yes       Tanya NICKIE Henson

## 2020-03-25 NOTE — ASSESSMENT & PLAN NOTE
Patient presenting with Left-sided upper and lower extremity weakness which he suddenly experienced when repairing pipe in the basement followed by a fall backwards, denies head trauma  Extensive intracranial disease demonstrated on CTA head - showing severe focal stenosis of proximal left M Segment and severe stenosis of the distal right V4 segment    MRI brain revealed "Acute right thalamic lacunar infarction" not demonstrated on CT head   Patient presented out of window for tPA  Continue ASA/plavix as well as high-intensity statin  Echo unremarkable, preserved EF, grade 1 diastolic dysfunction   Neurology recommendations appreciated  Resume home BP medications  Patient to be discharged to [facility] vs Acute rehab - PMR consult placed, input appreicated

## 2020-03-26 PROBLEM — E78.00 HYPERCHOLESTEROLEMIA: Status: ACTIVE | Noted: 2020-03-26

## 2020-03-26 PROBLEM — R11.0 NAUSEA: Status: ACTIVE | Noted: 2020-03-26

## 2020-03-26 PROBLEM — R73.03 PREDIABETES: Status: ACTIVE | Noted: 2020-03-26

## 2020-03-26 LAB
ALBUMIN SERPL BCP-MCNC: 4.1 G/DL (ref 3–5.2)
ALP SERPL-CCNC: 71 U/L (ref 43–122)
ALT SERPL W P-5'-P-CCNC: 39 U/L (ref 9–52)
ANION GAP SERPL CALCULATED.3IONS-SCNC: 12 MMOL/L (ref 5–14)
AST SERPL W P-5'-P-CCNC: 42 U/L (ref 17–59)
BASOPHILS # BLD AUTO: 0 THOUSANDS/ΜL (ref 0–0.1)
BASOPHILS NFR BLD AUTO: 1 % (ref 0–1)
BILIRUB SERPL-MCNC: 0.6 MG/DL
BUN SERPL-MCNC: 37 MG/DL (ref 5–25)
CALCIUM SERPL-MCNC: 9.2 MG/DL (ref 8.4–10.2)
CHLORIDE SERPL-SCNC: 100 MMOL/L (ref 97–108)
CO2 SERPL-SCNC: 25 MMOL/L (ref 22–30)
CREAT SERPL-MCNC: 1.97 MG/DL (ref 0.7–1.5)
EOSINOPHIL # BLD AUTO: 0 THOUSAND/ΜL (ref 0–0.4)
EOSINOPHIL NFR BLD AUTO: 1 % (ref 0–6)
ERYTHROCYTE [DISTWIDTH] IN BLOOD BY AUTOMATED COUNT: 14.2 %
GFR SERPL CREATININE-BSD FRML MDRD: 32 ML/MIN/1.73SQ M
GLUCOSE SERPL-MCNC: 166 MG/DL (ref 70–99)
HCT VFR BLD AUTO: 41.4 % (ref 41–53)
HGB BLD-MCNC: 14.1 G/DL (ref 13.5–17.5)
LYMPHOCYTES # BLD AUTO: 1.5 THOUSANDS/ΜL (ref 0.5–4)
LYMPHOCYTES NFR BLD AUTO: 17 % (ref 25–45)
MCH RBC QN AUTO: 29.2 PG (ref 26–34)
MCHC RBC AUTO-ENTMCNC: 34 G/DL (ref 31–36)
MCV RBC AUTO: 86 FL (ref 80–100)
MONOCYTES # BLD AUTO: 1 THOUSAND/ΜL (ref 0.2–0.9)
MONOCYTES NFR BLD AUTO: 11 % (ref 1–10)
NEUTROPHILS # BLD AUTO: 6 THOUSANDS/ΜL (ref 1.8–7.8)
NEUTS SEG NFR BLD AUTO: 70 % (ref 45–65)
PLATELET # BLD AUTO: 279 THOUSANDS/UL (ref 150–450)
PMV BLD AUTO: 8.7 FL (ref 8.9–12.7)
POTASSIUM SERPL-SCNC: 3.9 MMOL/L (ref 3.6–5)
PROT SERPL-MCNC: 7.4 G/DL (ref 5.9–8.4)
RBC # BLD AUTO: 4.81 MILLION/UL (ref 4.5–5.9)
SODIUM SERPL-SCNC: 137 MMOL/L (ref 137–147)
WBC # BLD AUTO: 8.5 THOUSAND/UL (ref 4.5–11)

## 2020-03-26 PROCEDURE — 97530 THERAPEUTIC ACTIVITIES: CPT

## 2020-03-26 PROCEDURE — 97112 NEUROMUSCULAR REEDUCATION: CPT

## 2020-03-26 PROCEDURE — 99222 1ST HOSP IP/OBS MODERATE 55: CPT | Performed by: INTERNAL MEDICINE

## 2020-03-26 PROCEDURE — 92523 SPEECH SOUND LANG COMPREHEN: CPT

## 2020-03-26 PROCEDURE — 99233 SBSQ HOSP IP/OBS HIGH 50: CPT | Performed by: PHYSICAL MEDICINE & REHABILITATION

## 2020-03-26 PROCEDURE — 97129 THER IVNTJ 1ST 15 MIN: CPT

## 2020-03-26 PROCEDURE — 97167 OT EVAL HIGH COMPLEX 60 MIN: CPT

## 2020-03-26 PROCEDURE — 85025 COMPLETE CBC W/AUTO DIFF WBC: CPT | Performed by: NURSE PRACTITIONER

## 2020-03-26 PROCEDURE — 97163 PT EVAL HIGH COMPLEX 45 MIN: CPT

## 2020-03-26 PROCEDURE — 97535 SELF CARE MNGMENT TRAINING: CPT

## 2020-03-26 PROCEDURE — 80053 COMPREHEN METABOLIC PANEL: CPT | Performed by: NURSE PRACTITIONER

## 2020-03-26 RX ORDER — ONDANSETRON 4 MG/1
4 TABLET, ORALLY DISINTEGRATING ORAL EVERY 6 HOURS PRN
Status: DISCONTINUED | OUTPATIENT
Start: 2020-03-26 | End: 2020-04-14 | Stop reason: HOSPADM

## 2020-03-26 RX ORDER — SODIUM CHLORIDE 9 MG/ML
125 INJECTION, SOLUTION INTRAVENOUS CONTINUOUS
Status: DISCONTINUED | OUTPATIENT
Start: 2020-03-26 | End: 2020-04-09

## 2020-03-26 RX ADMIN — ASPIRIN 81 MG: 81 TABLET ORAL at 08:04

## 2020-03-26 RX ADMIN — ONDANSETRON 4 MG: 4 TABLET, ORALLY DISINTEGRATING ORAL at 10:38

## 2020-03-26 RX ADMIN — SODIUM CHLORIDE 125 ML/HR: 0.9 INJECTION, SOLUTION INTRAVENOUS at 17:22

## 2020-03-26 RX ADMIN — LISINOPRIL 40 MG: 20 TABLET ORAL at 08:04

## 2020-03-26 RX ADMIN — ATORVASTATIN CALCIUM 80 MG: 80 TABLET, FILM COATED ORAL at 16:50

## 2020-03-26 RX ADMIN — METOPROLOL SUCCINATE 100 MG: 50 TABLET, EXTENDED RELEASE ORAL at 08:04

## 2020-03-26 RX ADMIN — HYDROCHLOROTHIAZIDE 25 MG: 25 TABLET ORAL at 08:04

## 2020-03-26 RX ADMIN — CLOPIDOGREL BISULFATE 75 MG: 75 TABLET ORAL at 08:04

## 2020-03-26 RX ADMIN — ENOXAPARIN SODIUM 40 MG: 100 INJECTION SUBCUTANEOUS at 08:04

## 2020-03-27 PROBLEM — N17.9 AKI (ACUTE KIDNEY INJURY) (HCC): Status: ACTIVE | Noted: 2020-03-27

## 2020-03-27 LAB
ANION GAP SERPL CALCULATED.3IONS-SCNC: 7 MMOL/L (ref 5–14)
BUN SERPL-MCNC: 37 MG/DL (ref 5–25)
CALCIUM SERPL-MCNC: 8.4 MG/DL (ref 8.4–10.2)
CHLORIDE SERPL-SCNC: 104 MMOL/L (ref 97–108)
CO2 SERPL-SCNC: 25 MMOL/L (ref 22–30)
CREAT SERPL-MCNC: 1.48 MG/DL (ref 0.7–1.5)
EOSINOPHIL # BLD AUTO: 0.24 THOUSAND/UL (ref 0–0.4)
EOSINOPHIL NFR BLD MANUAL: 3 % (ref 0–6)
ERYTHROCYTE [DISTWIDTH] IN BLOOD BY AUTOMATED COUNT: 13.9 %
GFR SERPL CREATININE-BSD FRML MDRD: 45 ML/MIN/1.73SQ M
GLUCOSE SERPL-MCNC: 113 MG/DL (ref 70–99)
HCT VFR BLD AUTO: 38.5 % (ref 41–53)
HGB BLD-MCNC: 13 G/DL (ref 13.5–17.5)
LYMPHOCYTES # BLD AUTO: 1.9 THOUSAND/UL (ref 0.5–4)
LYMPHOCYTES # BLD AUTO: 24 % (ref 25–45)
MCH RBC QN AUTO: 29.4 PG (ref 26–34)
MCHC RBC AUTO-ENTMCNC: 33.7 G/DL (ref 31–36)
MCV RBC AUTO: 87 FL (ref 80–100)
MONOCYTES # BLD AUTO: 1.26 THOUSAND/UL (ref 0.2–0.9)
MONOCYTES NFR BLD AUTO: 16 % (ref 1–10)
NEUTS BAND NFR BLD MANUAL: 1 % (ref 0–8)
NEUTS SEG # BLD: 4.35 THOUSAND/UL (ref 1.8–7.8)
NEUTS SEG NFR BLD AUTO: 54 %
PLATELET # BLD AUTO: 242 THOUSANDS/UL (ref 150–450)
PLATELET BLD QL SMEAR: ADEQUATE
PMV BLD AUTO: 8.9 FL (ref 8.9–12.7)
POTASSIUM SERPL-SCNC: 3.5 MMOL/L (ref 3.6–5)
RBC # BLD AUTO: 4.41 MILLION/UL (ref 4.5–5.9)
RBC MORPH BLD: NORMAL
SODIUM SERPL-SCNC: 136 MMOL/L (ref 137–147)
TOTAL CELLS COUNTED SPEC: 100
VARIANT LYMPHS # BLD AUTO: 2 % (ref 0–0)
WBC # BLD AUTO: 7.9 THOUSAND/UL (ref 4.5–11)

## 2020-03-27 PROCEDURE — 80048 BASIC METABOLIC PNL TOTAL CA: CPT | Performed by: NURSE PRACTITIONER

## 2020-03-27 PROCEDURE — 97129 THER IVNTJ 1ST 15 MIN: CPT

## 2020-03-27 PROCEDURE — 97112 NEUROMUSCULAR REEDUCATION: CPT

## 2020-03-27 PROCEDURE — 99232 SBSQ HOSP IP/OBS MODERATE 35: CPT | Performed by: INTERNAL MEDICINE

## 2020-03-27 PROCEDURE — 97530 THERAPEUTIC ACTIVITIES: CPT

## 2020-03-27 PROCEDURE — 85007 BL SMEAR W/DIFF WBC COUNT: CPT | Performed by: NURSE PRACTITIONER

## 2020-03-27 PROCEDURE — 99232 SBSQ HOSP IP/OBS MODERATE 35: CPT | Performed by: PHYSICAL MEDICINE & REHABILITATION

## 2020-03-27 PROCEDURE — 97116 GAIT TRAINING THERAPY: CPT

## 2020-03-27 PROCEDURE — 85027 COMPLETE CBC AUTOMATED: CPT | Performed by: NURSE PRACTITIONER

## 2020-03-27 PROCEDURE — 97130 THER IVNTJ EA ADDL 15 MIN: CPT

## 2020-03-27 RX ORDER — HYDRALAZINE HYDROCHLORIDE 25 MG/1
25 TABLET, FILM COATED ORAL EVERY 8 HOURS PRN
Status: DISCONTINUED | OUTPATIENT
Start: 2020-03-27 | End: 2020-04-14 | Stop reason: HOSPADM

## 2020-03-27 RX ORDER — POTASSIUM CHLORIDE 20 MEQ/1
20 TABLET, EXTENDED RELEASE ORAL ONCE
Status: COMPLETED | OUTPATIENT
Start: 2020-03-27 | End: 2020-03-27

## 2020-03-27 RX ADMIN — ASPIRIN 81 MG: 81 TABLET ORAL at 08:14

## 2020-03-27 RX ADMIN — POTASSIUM CHLORIDE 20 MEQ: 1500 TABLET, EXTENDED RELEASE ORAL at 09:33

## 2020-03-27 RX ADMIN — SODIUM CHLORIDE 125 ML/HR: 0.9 INJECTION, SOLUTION INTRAVENOUS at 01:37

## 2020-03-27 RX ADMIN — ENOXAPARIN SODIUM 40 MG: 100 INJECTION SUBCUTANEOUS at 08:14

## 2020-03-27 RX ADMIN — CLOPIDOGREL BISULFATE 75 MG: 75 TABLET ORAL at 08:14

## 2020-03-27 RX ADMIN — METOPROLOL SUCCINATE 100 MG: 50 TABLET, EXTENDED RELEASE ORAL at 08:14

## 2020-03-27 RX ADMIN — LISINOPRIL 40 MG: 20 TABLET ORAL at 08:14

## 2020-03-27 RX ADMIN — ATORVASTATIN CALCIUM 80 MG: 80 TABLET, FILM COATED ORAL at 16:48

## 2020-03-28 LAB
ANION GAP SERPL CALCULATED.3IONS-SCNC: 6 MMOL/L (ref 5–14)
BUN SERPL-MCNC: 28 MG/DL (ref 5–25)
CALCIUM SERPL-MCNC: 9.1 MG/DL (ref 8.4–10.2)
CHLORIDE SERPL-SCNC: 104 MMOL/L (ref 97–108)
CO2 SERPL-SCNC: 30 MMOL/L (ref 22–30)
CREAT SERPL-MCNC: 1.07 MG/DL (ref 0.7–1.5)
GFR SERPL CREATININE-BSD FRML MDRD: 67 ML/MIN/1.73SQ M
GLUCOSE SERPL-MCNC: 117 MG/DL (ref 70–99)
POTASSIUM SERPL-SCNC: 4.6 MMOL/L (ref 3.6–5)
SODIUM SERPL-SCNC: 140 MMOL/L (ref 137–147)

## 2020-03-28 PROCEDURE — 97116 GAIT TRAINING THERAPY: CPT

## 2020-03-28 PROCEDURE — 97112 NEUROMUSCULAR REEDUCATION: CPT

## 2020-03-28 PROCEDURE — 97110 THERAPEUTIC EXERCISES: CPT

## 2020-03-28 PROCEDURE — 99232 SBSQ HOSP IP/OBS MODERATE 35: CPT | Performed by: NURSE PRACTITIONER

## 2020-03-28 PROCEDURE — 80048 BASIC METABOLIC PNL TOTAL CA: CPT | Performed by: NURSE PRACTITIONER

## 2020-03-28 PROCEDURE — 97535 SELF CARE MNGMENT TRAINING: CPT

## 2020-03-28 PROCEDURE — 97530 THERAPEUTIC ACTIVITIES: CPT

## 2020-03-28 PROCEDURE — 97130 THER IVNTJ EA ADDL 15 MIN: CPT

## 2020-03-28 RX ADMIN — LISINOPRIL 40 MG: 20 TABLET ORAL at 08:35

## 2020-03-28 RX ADMIN — CLOPIDOGREL BISULFATE 75 MG: 75 TABLET ORAL at 08:35

## 2020-03-28 RX ADMIN — METOPROLOL SUCCINATE 100 MG: 50 TABLET, EXTENDED RELEASE ORAL at 08:35

## 2020-03-28 RX ADMIN — ASPIRIN 81 MG: 81 TABLET ORAL at 08:35

## 2020-03-28 RX ADMIN — ENOXAPARIN SODIUM 40 MG: 100 INJECTION SUBCUTANEOUS at 08:35

## 2020-03-28 RX ADMIN — ATORVASTATIN CALCIUM 80 MG: 80 TABLET, FILM COATED ORAL at 16:48

## 2020-03-29 PROBLEM — G47.00 INSOMNIA: Status: ACTIVE | Noted: 2020-03-29

## 2020-03-29 PROBLEM — K59.00 CONSTIPATION: Status: ACTIVE | Noted: 2020-03-29

## 2020-03-29 PROBLEM — R63.0 LACK OF APPETITE: Status: ACTIVE | Noted: 2020-03-29

## 2020-03-29 PROCEDURE — 97530 THERAPEUTIC ACTIVITIES: CPT

## 2020-03-29 PROCEDURE — 97112 NEUROMUSCULAR REEDUCATION: CPT

## 2020-03-29 PROCEDURE — 97110 THERAPEUTIC EXERCISES: CPT

## 2020-03-29 PROCEDURE — 99232 SBSQ HOSP IP/OBS MODERATE 35: CPT | Performed by: NURSE PRACTITIONER

## 2020-03-29 PROCEDURE — 97116 GAIT TRAINING THERAPY: CPT

## 2020-03-29 RX ORDER — DOCUSATE SODIUM 100 MG/1
100 CAPSULE, LIQUID FILLED ORAL 2 TIMES DAILY
Status: DISCONTINUED | OUTPATIENT
Start: 2020-03-29 | End: 2020-04-14 | Stop reason: HOSPADM

## 2020-03-29 RX ORDER — POLYETHYLENE GLYCOL 3350 17 G/17G
17 POWDER, FOR SOLUTION ORAL DAILY
Status: DISCONTINUED | OUTPATIENT
Start: 2020-03-29 | End: 2020-04-14 | Stop reason: HOSPADM

## 2020-03-29 RX ORDER — LANOLIN ALCOHOL/MO/W.PET/CERES
3 CREAM (GRAM) TOPICAL
Status: DISCONTINUED | OUTPATIENT
Start: 2020-03-29 | End: 2020-04-14 | Stop reason: HOSPADM

## 2020-03-29 RX ADMIN — DOCUSATE SODIUM 100 MG: 100 CAPSULE, LIQUID FILLED ORAL at 12:14

## 2020-03-29 RX ADMIN — ASPIRIN 81 MG: 81 TABLET ORAL at 08:31

## 2020-03-29 RX ADMIN — METOPROLOL SUCCINATE 100 MG: 50 TABLET, EXTENDED RELEASE ORAL at 08:31

## 2020-03-29 RX ADMIN — MELATONIN TAB 3 MG 3 MG: 3 TAB at 21:01

## 2020-03-29 RX ADMIN — DOCUSATE SODIUM 100 MG: 100 CAPSULE, LIQUID FILLED ORAL at 17:30

## 2020-03-29 RX ADMIN — ENOXAPARIN SODIUM 40 MG: 100 INJECTION SUBCUTANEOUS at 08:31

## 2020-03-29 RX ADMIN — CLOPIDOGREL BISULFATE 75 MG: 75 TABLET ORAL at 08:31

## 2020-03-29 RX ADMIN — POLYETHYLENE GLYCOL 3350 17 G: 17 POWDER, FOR SOLUTION ORAL at 10:57

## 2020-03-29 RX ADMIN — LISINOPRIL 40 MG: 20 TABLET ORAL at 08:31

## 2020-03-29 RX ADMIN — ATORVASTATIN CALCIUM 80 MG: 80 TABLET, FILM COATED ORAL at 17:30

## 2020-03-30 PROBLEM — E87.6 HYPOKALEMIA: Status: RESOLVED | Noted: 2020-03-21 | Resolved: 2020-03-30

## 2020-03-30 PROBLEM — N17.9 AKI (ACUTE KIDNEY INJURY) (HCC): Status: RESOLVED | Noted: 2020-03-27 | Resolved: 2020-03-30

## 2020-03-30 LAB
ANION GAP SERPL CALCULATED.3IONS-SCNC: 7 MMOL/L (ref 5–14)
BUN SERPL-MCNC: 19 MG/DL (ref 5–25)
CALCIUM SERPL-MCNC: 9.3 MG/DL (ref 8.4–10.2)
CHLORIDE SERPL-SCNC: 102 MMOL/L (ref 97–108)
CO2 SERPL-SCNC: 30 MMOL/L (ref 22–30)
CREAT SERPL-MCNC: 0.85 MG/DL (ref 0.7–1.5)
ERYTHROCYTE [DISTWIDTH] IN BLOOD BY AUTOMATED COUNT: 13.7 %
GFR SERPL CREATININE-BSD FRML MDRD: 85 ML/MIN/1.73SQ M
GLUCOSE P FAST SERPL-MCNC: 105 MG/DL (ref 70–99)
GLUCOSE SERPL-MCNC: 105 MG/DL (ref 70–99)
HCT VFR BLD AUTO: 39.5 % (ref 41–53)
HGB BLD-MCNC: 13.3 G/DL (ref 13.5–17.5)
LYMPHOCYTES # BLD AUTO: 2.31 THOUSAND/UL (ref 0.5–4)
LYMPHOCYTES # BLD AUTO: 35 % (ref 25–45)
MCH RBC QN AUTO: 29.1 PG (ref 26–34)
MCHC RBC AUTO-ENTMCNC: 33.6 G/DL (ref 31–36)
MCV RBC AUTO: 87 FL (ref 80–100)
MONOCYTES # BLD AUTO: 0.2 THOUSAND/UL (ref 0.2–0.9)
MONOCYTES NFR BLD AUTO: 3 % (ref 1–10)
MYELOCYTES NFR BLD MANUAL: 1 % (ref 0–1)
NEUTS BAND NFR BLD MANUAL: 1 % (ref 0–8)
NEUTS SEG # BLD: 4.03 THOUSAND/UL (ref 1.8–7.8)
NEUTS SEG NFR BLD AUTO: 60 %
PLATELET # BLD AUTO: 255 THOUSANDS/UL (ref 150–450)
PLATELET BLD QL SMEAR: ADEQUATE
PMV BLD AUTO: 9.1 FL (ref 8.9–12.7)
POTASSIUM SERPL-SCNC: 4.6 MMOL/L (ref 3.6–5)
RBC # BLD AUTO: 4.56 MILLION/UL (ref 4.5–5.9)
RBC MORPH BLD: NORMAL
SODIUM SERPL-SCNC: 139 MMOL/L (ref 137–147)
TOTAL CELLS COUNTED SPEC: 100
WBC # BLD AUTO: 6.6 THOUSAND/UL (ref 4.5–11)

## 2020-03-30 PROCEDURE — 97112 NEUROMUSCULAR REEDUCATION: CPT

## 2020-03-30 PROCEDURE — 97110 THERAPEUTIC EXERCISES: CPT

## 2020-03-30 PROCEDURE — 97129 THER IVNTJ 1ST 15 MIN: CPT

## 2020-03-30 PROCEDURE — 97116 GAIT TRAINING THERAPY: CPT

## 2020-03-30 PROCEDURE — 85027 COMPLETE CBC AUTOMATED: CPT | Performed by: PHYSICAL MEDICINE & REHABILITATION

## 2020-03-30 PROCEDURE — 97130 THER IVNTJ EA ADDL 15 MIN: CPT

## 2020-03-30 PROCEDURE — 99232 SBSQ HOSP IP/OBS MODERATE 35: CPT | Performed by: PHYSICAL MEDICINE & REHABILITATION

## 2020-03-30 PROCEDURE — 97530 THERAPEUTIC ACTIVITIES: CPT

## 2020-03-30 PROCEDURE — 99231 SBSQ HOSP IP/OBS SF/LOW 25: CPT | Performed by: INTERNAL MEDICINE

## 2020-03-30 PROCEDURE — 85007 BL SMEAR W/DIFF WBC COUNT: CPT | Performed by: PHYSICAL MEDICINE & REHABILITATION

## 2020-03-30 PROCEDURE — 80048 BASIC METABOLIC PNL TOTAL CA: CPT | Performed by: NURSE PRACTITIONER

## 2020-03-30 RX ORDER — SENNOSIDES 8.6 MG
1 TABLET ORAL
Status: DISCONTINUED | OUTPATIENT
Start: 2020-03-30 | End: 2020-03-31

## 2020-03-30 RX ADMIN — CLOPIDOGREL BISULFATE 75 MG: 75 TABLET ORAL at 09:33

## 2020-03-30 RX ADMIN — ENOXAPARIN SODIUM 40 MG: 100 INJECTION SUBCUTANEOUS at 09:33

## 2020-03-30 RX ADMIN — ASPIRIN 81 MG: 81 TABLET ORAL at 09:33

## 2020-03-30 RX ADMIN — POLYETHYLENE GLYCOL 3350 17 G: 17 POWDER, FOR SOLUTION ORAL at 09:33

## 2020-03-30 RX ADMIN — METOPROLOL SUCCINATE 100 MG: 50 TABLET, EXTENDED RELEASE ORAL at 09:33

## 2020-03-30 RX ADMIN — ATORVASTATIN CALCIUM 80 MG: 80 TABLET, FILM COATED ORAL at 17:07

## 2020-03-30 RX ADMIN — DOCUSATE SODIUM 100 MG: 100 CAPSULE, LIQUID FILLED ORAL at 17:07

## 2020-03-30 RX ADMIN — MELATONIN TAB 3 MG 3 MG: 3 TAB at 21:34

## 2020-03-30 RX ADMIN — SENNOSIDES 8.6 MG: 8.6 TABLET, FILM COATED ORAL at 21:34

## 2020-03-30 RX ADMIN — DOCUSATE SODIUM 100 MG: 100 CAPSULE, LIQUID FILLED ORAL at 09:33

## 2020-03-30 RX ADMIN — LISINOPRIL 40 MG: 20 TABLET ORAL at 09:33

## 2020-03-31 PROCEDURE — 97130 THER IVNTJ EA ADDL 15 MIN: CPT

## 2020-03-31 PROCEDURE — 97116 GAIT TRAINING THERAPY: CPT

## 2020-03-31 PROCEDURE — 97110 THERAPEUTIC EXERCISES: CPT

## 2020-03-31 PROCEDURE — 97129 THER IVNTJ 1ST 15 MIN: CPT

## 2020-03-31 PROCEDURE — 97112 NEUROMUSCULAR REEDUCATION: CPT

## 2020-03-31 PROCEDURE — 99231 SBSQ HOSP IP/OBS SF/LOW 25: CPT | Performed by: NURSE PRACTITIONER

## 2020-03-31 PROCEDURE — 99232 SBSQ HOSP IP/OBS MODERATE 35: CPT | Performed by: PHYSICAL MEDICINE & REHABILITATION

## 2020-03-31 RX ORDER — SENNOSIDES 8.6 MG
2 TABLET ORAL
Status: DISCONTINUED | OUTPATIENT
Start: 2020-03-31 | End: 2020-04-14 | Stop reason: HOSPADM

## 2020-03-31 RX ORDER — BISACODYL 10 MG
10 SUPPOSITORY, RECTAL RECTAL DAILY PRN
Status: DISCONTINUED | OUTPATIENT
Start: 2020-03-31 | End: 2020-04-14 | Stop reason: HOSPADM

## 2020-03-31 RX ADMIN — ATORVASTATIN CALCIUM 80 MG: 80 TABLET, FILM COATED ORAL at 17:32

## 2020-03-31 RX ADMIN — DOCUSATE SODIUM 100 MG: 100 CAPSULE, LIQUID FILLED ORAL at 09:47

## 2020-03-31 RX ADMIN — DOCUSATE SODIUM 100 MG: 100 CAPSULE, LIQUID FILLED ORAL at 17:33

## 2020-03-31 RX ADMIN — POLYETHYLENE GLYCOL 3350 17 G: 17 POWDER, FOR SOLUTION ORAL at 09:48

## 2020-03-31 RX ADMIN — ASPIRIN 81 MG: 81 TABLET ORAL at 09:47

## 2020-03-31 RX ADMIN — ENOXAPARIN SODIUM 40 MG: 100 INJECTION SUBCUTANEOUS at 09:47

## 2020-03-31 RX ADMIN — SENNOSIDES 17.2 MG: 8.6 TABLET, FILM COATED ORAL at 22:09

## 2020-03-31 RX ADMIN — CLOPIDOGREL BISULFATE 75 MG: 75 TABLET ORAL at 09:48

## 2020-04-01 PROBLEM — R11.0 NAUSEA: Status: RESOLVED | Noted: 2020-03-26 | Resolved: 2020-04-01

## 2020-04-01 PROCEDURE — 97530 THERAPEUTIC ACTIVITIES: CPT

## 2020-04-01 PROCEDURE — 97116 GAIT TRAINING THERAPY: CPT

## 2020-04-01 PROCEDURE — 97110 THERAPEUTIC EXERCISES: CPT

## 2020-04-01 PROCEDURE — 97112 NEUROMUSCULAR REEDUCATION: CPT

## 2020-04-01 PROCEDURE — 99231 SBSQ HOSP IP/OBS SF/LOW 25: CPT | Performed by: NURSE PRACTITIONER

## 2020-04-01 PROCEDURE — 97129 THER IVNTJ 1ST 15 MIN: CPT

## 2020-04-01 PROCEDURE — 99232 SBSQ HOSP IP/OBS MODERATE 35: CPT | Performed by: PHYSICAL MEDICINE & REHABILITATION

## 2020-04-01 RX ADMIN — METOPROLOL SUCCINATE 100 MG: 50 TABLET, EXTENDED RELEASE ORAL at 09:06

## 2020-04-01 RX ADMIN — POLYETHYLENE GLYCOL 3350 17 G: 17 POWDER, FOR SOLUTION ORAL at 09:08

## 2020-04-01 RX ADMIN — CLOPIDOGREL BISULFATE 75 MG: 75 TABLET ORAL at 09:05

## 2020-04-01 RX ADMIN — ENOXAPARIN SODIUM 40 MG: 100 INJECTION SUBCUTANEOUS at 09:08

## 2020-04-01 RX ADMIN — BISACODYL 10 MG: 10 SUPPOSITORY RECTAL at 16:59

## 2020-04-01 RX ADMIN — ASPIRIN 81 MG: 81 TABLET ORAL at 09:05

## 2020-04-01 RX ADMIN — SENNOSIDES 17.2 MG: 8.6 TABLET, FILM COATED ORAL at 22:22

## 2020-04-01 RX ADMIN — LISINOPRIL 40 MG: 20 TABLET ORAL at 09:07

## 2020-04-01 RX ADMIN — DOCUSATE SODIUM 100 MG: 100 CAPSULE, LIQUID FILLED ORAL at 17:00

## 2020-04-01 RX ADMIN — DOCUSATE SODIUM 100 MG: 100 CAPSULE, LIQUID FILLED ORAL at 09:08

## 2020-04-01 RX ADMIN — ATORVASTATIN CALCIUM 80 MG: 80 TABLET, FILM COATED ORAL at 16:59

## 2020-04-02 LAB
ANION GAP SERPL CALCULATED.3IONS-SCNC: 6 MMOL/L (ref 5–14)
BASOPHILS # BLD AUTO: 0.1 THOUSANDS/ΜL (ref 0–0.1)
BASOPHILS NFR BLD AUTO: 1 % (ref 0–1)
BUN SERPL-MCNC: 16 MG/DL (ref 5–25)
CALCIUM SERPL-MCNC: 9.3 MG/DL (ref 8.4–10.2)
CHLORIDE SERPL-SCNC: 103 MMOL/L (ref 97–108)
CO2 SERPL-SCNC: 31 MMOL/L (ref 22–30)
CREAT SERPL-MCNC: 0.85 MG/DL (ref 0.7–1.5)
EOSINOPHIL # BLD AUTO: 0.2 THOUSAND/ΜL (ref 0–0.4)
EOSINOPHIL NFR BLD AUTO: 2 % (ref 0–6)
ERYTHROCYTE [DISTWIDTH] IN BLOOD BY AUTOMATED COUNT: 13.7 %
GFR SERPL CREATININE-BSD FRML MDRD: 85 ML/MIN/1.73SQ M
GLUCOSE SERPL-MCNC: 105 MG/DL (ref 70–99)
HCT VFR BLD AUTO: 40.3 % (ref 41–53)
HGB BLD-MCNC: 13.3 G/DL (ref 13.5–17.5)
LYMPHOCYTES # BLD AUTO: 2 THOUSANDS/ΜL (ref 0.5–4)
LYMPHOCYTES NFR BLD AUTO: 29 % (ref 25–45)
MCH RBC QN AUTO: 28.7 PG (ref 26–34)
MCHC RBC AUTO-ENTMCNC: 33.1 G/DL (ref 31–36)
MCV RBC AUTO: 87 FL (ref 80–100)
MONOCYTES # BLD AUTO: 0.6 THOUSAND/ΜL (ref 0.2–0.9)
MONOCYTES NFR BLD AUTO: 9 % (ref 1–10)
NEUTROPHILS # BLD AUTO: 4.1 THOUSANDS/ΜL (ref 1.8–7.8)
NEUTS SEG NFR BLD AUTO: 58 % (ref 45–65)
PLATELET # BLD AUTO: 268 THOUSANDS/UL (ref 150–450)
PMV BLD AUTO: 9 FL (ref 8.9–12.7)
POTASSIUM SERPL-SCNC: 3.8 MMOL/L (ref 3.6–5)
RBC # BLD AUTO: 4.64 MILLION/UL (ref 4.5–5.9)
SODIUM SERPL-SCNC: 140 MMOL/L (ref 137–147)
WBC # BLD AUTO: 7 THOUSAND/UL (ref 4.5–11)

## 2020-04-02 PROCEDURE — 97116 GAIT TRAINING THERAPY: CPT

## 2020-04-02 PROCEDURE — 97112 NEUROMUSCULAR REEDUCATION: CPT

## 2020-04-02 PROCEDURE — 97110 THERAPEUTIC EXERCISES: CPT

## 2020-04-02 PROCEDURE — 97535 SELF CARE MNGMENT TRAINING: CPT

## 2020-04-02 PROCEDURE — 85025 COMPLETE CBC W/AUTO DIFF WBC: CPT | Performed by: NURSE PRACTITIONER

## 2020-04-02 PROCEDURE — 97530 THERAPEUTIC ACTIVITIES: CPT

## 2020-04-02 PROCEDURE — 99232 SBSQ HOSP IP/OBS MODERATE 35: CPT | Performed by: INTERNAL MEDICINE

## 2020-04-02 PROCEDURE — 99233 SBSQ HOSP IP/OBS HIGH 50: CPT | Performed by: PHYSICAL MEDICINE & REHABILITATION

## 2020-04-02 PROCEDURE — 80048 BASIC METABOLIC PNL TOTAL CA: CPT | Performed by: NURSE PRACTITIONER

## 2020-04-02 RX ADMIN — ENOXAPARIN SODIUM 40 MG: 100 INJECTION SUBCUTANEOUS at 08:38

## 2020-04-02 RX ADMIN — DOCUSATE SODIUM 100 MG: 100 CAPSULE, LIQUID FILLED ORAL at 08:38

## 2020-04-02 RX ADMIN — CLOPIDOGREL BISULFATE 75 MG: 75 TABLET ORAL at 08:39

## 2020-04-02 RX ADMIN — DOCUSATE SODIUM 100 MG: 100 CAPSULE, LIQUID FILLED ORAL at 16:44

## 2020-04-02 RX ADMIN — LISINOPRIL 40 MG: 20 TABLET ORAL at 08:38

## 2020-04-02 RX ADMIN — ASPIRIN 81 MG: 81 TABLET ORAL at 08:38

## 2020-04-02 RX ADMIN — POLYETHYLENE GLYCOL 3350 17 G: 17 POWDER, FOR SOLUTION ORAL at 08:39

## 2020-04-02 RX ADMIN — METOPROLOL SUCCINATE 100 MG: 50 TABLET, EXTENDED RELEASE ORAL at 08:38

## 2020-04-02 RX ADMIN — ATORVASTATIN CALCIUM 80 MG: 80 TABLET, FILM COATED ORAL at 16:44

## 2020-04-03 PROCEDURE — 97116 GAIT TRAINING THERAPY: CPT

## 2020-04-03 PROCEDURE — 99232 SBSQ HOSP IP/OBS MODERATE 35: CPT | Performed by: PHYSICAL MEDICINE & REHABILITATION

## 2020-04-03 PROCEDURE — 97110 THERAPEUTIC EXERCISES: CPT

## 2020-04-03 PROCEDURE — 97112 NEUROMUSCULAR REEDUCATION: CPT

## 2020-04-03 PROCEDURE — 97530 THERAPEUTIC ACTIVITIES: CPT

## 2020-04-03 PROCEDURE — 97535 SELF CARE MNGMENT TRAINING: CPT

## 2020-04-03 PROCEDURE — 99231 SBSQ HOSP IP/OBS SF/LOW 25: CPT | Performed by: INTERNAL MEDICINE

## 2020-04-03 RX ADMIN — METOPROLOL SUCCINATE 100 MG: 50 TABLET, EXTENDED RELEASE ORAL at 07:58

## 2020-04-03 RX ADMIN — ENOXAPARIN SODIUM 40 MG: 100 INJECTION SUBCUTANEOUS at 07:56

## 2020-04-03 RX ADMIN — ATORVASTATIN CALCIUM 80 MG: 80 TABLET, FILM COATED ORAL at 17:28

## 2020-04-03 RX ADMIN — POLYETHYLENE GLYCOL 3350 17 G: 17 POWDER, FOR SOLUTION ORAL at 07:56

## 2020-04-03 RX ADMIN — ASPIRIN 81 MG: 81 TABLET ORAL at 07:57

## 2020-04-03 RX ADMIN — DOCUSATE SODIUM 100 MG: 100 CAPSULE, LIQUID FILLED ORAL at 17:28

## 2020-04-03 RX ADMIN — CLOPIDOGREL BISULFATE 75 MG: 75 TABLET ORAL at 07:57

## 2020-04-03 RX ADMIN — DOCUSATE SODIUM 100 MG: 100 CAPSULE, LIQUID FILLED ORAL at 07:57

## 2020-04-04 PROCEDURE — 97129 THER IVNTJ 1ST 15 MIN: CPT

## 2020-04-04 PROCEDURE — 97530 THERAPEUTIC ACTIVITIES: CPT

## 2020-04-04 PROCEDURE — 99231 SBSQ HOSP IP/OBS SF/LOW 25: CPT | Performed by: INTERNAL MEDICINE

## 2020-04-04 PROCEDURE — 97130 THER IVNTJ EA ADDL 15 MIN: CPT

## 2020-04-04 RX ADMIN — ENOXAPARIN SODIUM 40 MG: 100 INJECTION SUBCUTANEOUS at 08:28

## 2020-04-04 RX ADMIN — CLOPIDOGREL BISULFATE 75 MG: 75 TABLET ORAL at 08:27

## 2020-04-04 RX ADMIN — ASPIRIN 81 MG: 81 TABLET ORAL at 08:27

## 2020-04-04 RX ADMIN — LISINOPRIL 40 MG: 20 TABLET ORAL at 08:27

## 2020-04-04 RX ADMIN — ATORVASTATIN CALCIUM 80 MG: 80 TABLET, FILM COATED ORAL at 17:03

## 2020-04-04 RX ADMIN — DOCUSATE SODIUM 100 MG: 100 CAPSULE, LIQUID FILLED ORAL at 08:27

## 2020-04-04 RX ADMIN — METOPROLOL SUCCINATE 100 MG: 50 TABLET, EXTENDED RELEASE ORAL at 08:27

## 2020-04-04 RX ADMIN — DOCUSATE SODIUM 100 MG: 100 CAPSULE, LIQUID FILLED ORAL at 17:03

## 2020-04-05 PROCEDURE — 99232 SBSQ HOSP IP/OBS MODERATE 35: CPT | Performed by: INTERNAL MEDICINE

## 2020-04-05 PROCEDURE — 97530 THERAPEUTIC ACTIVITIES: CPT

## 2020-04-05 PROCEDURE — 97116 GAIT TRAINING THERAPY: CPT

## 2020-04-05 PROCEDURE — 97112 NEUROMUSCULAR REEDUCATION: CPT

## 2020-04-05 PROCEDURE — 97110 THERAPEUTIC EXERCISES: CPT

## 2020-04-05 RX ADMIN — ATORVASTATIN CALCIUM 80 MG: 80 TABLET, FILM COATED ORAL at 17:19

## 2020-04-05 RX ADMIN — DOCUSATE SODIUM 100 MG: 100 CAPSULE, LIQUID FILLED ORAL at 08:47

## 2020-04-05 RX ADMIN — CLOPIDOGREL BISULFATE 75 MG: 75 TABLET ORAL at 08:48

## 2020-04-05 RX ADMIN — ASPIRIN 81 MG: 81 TABLET ORAL at 08:47

## 2020-04-05 RX ADMIN — POLYETHYLENE GLYCOL 3350 17 G: 17 POWDER, FOR SOLUTION ORAL at 08:47

## 2020-04-05 RX ADMIN — ONDANSETRON 4 MG: 4 TABLET, ORALLY DISINTEGRATING ORAL at 11:33

## 2020-04-05 RX ADMIN — ENOXAPARIN SODIUM 40 MG: 100 INJECTION SUBCUTANEOUS at 08:48

## 2020-04-05 RX ADMIN — DOCUSATE SODIUM 100 MG: 100 CAPSULE, LIQUID FILLED ORAL at 17:19

## 2020-04-05 RX ADMIN — MELATONIN TAB 3 MG 3 MG: 3 TAB at 00:44

## 2020-04-06 LAB
ANION GAP SERPL CALCULATED.3IONS-SCNC: 6 MMOL/L (ref 5–14)
BASOPHILS # BLD AUTO: 0 THOUSANDS/ΜL (ref 0–0.1)
BASOPHILS NFR BLD AUTO: 1 % (ref 0–1)
BUN SERPL-MCNC: 13 MG/DL (ref 5–25)
CALCIUM SERPL-MCNC: 9 MG/DL (ref 8.4–10.2)
CHLORIDE SERPL-SCNC: 102 MMOL/L (ref 97–108)
CO2 SERPL-SCNC: 30 MMOL/L (ref 22–30)
CREAT SERPL-MCNC: 0.77 MG/DL (ref 0.7–1.5)
EOSINOPHIL # BLD AUTO: 0.1 THOUSAND/ΜL (ref 0–0.4)
EOSINOPHIL NFR BLD AUTO: 2 % (ref 0–6)
ERYTHROCYTE [DISTWIDTH] IN BLOOD BY AUTOMATED COUNT: 13.5 %
GFR SERPL CREATININE-BSD FRML MDRD: 88 ML/MIN/1.73SQ M
GLUCOSE SERPL-MCNC: 105 MG/DL (ref 70–99)
HCT VFR BLD AUTO: 38 % (ref 41–53)
HGB BLD-MCNC: 13.1 G/DL (ref 13.5–17.5)
LYMPHOCYTES # BLD AUTO: 2.3 THOUSANDS/ΜL (ref 0.5–4)
LYMPHOCYTES NFR BLD AUTO: 35 % (ref 25–45)
MCH RBC QN AUTO: 30.1 PG (ref 26–34)
MCHC RBC AUTO-ENTMCNC: 34.5 G/DL (ref 31–36)
MCV RBC AUTO: 87 FL (ref 80–100)
MONOCYTES # BLD AUTO: 0.6 THOUSAND/ΜL (ref 0.2–0.9)
MONOCYTES NFR BLD AUTO: 9 % (ref 1–10)
NEUTROPHILS # BLD AUTO: 3.4 THOUSANDS/ΜL (ref 1.8–7.8)
NEUTS SEG NFR BLD AUTO: 53 % (ref 45–65)
PLATELET # BLD AUTO: 264 THOUSANDS/UL (ref 150–450)
PMV BLD AUTO: 8.9 FL (ref 8.9–12.7)
POTASSIUM SERPL-SCNC: 3.9 MMOL/L (ref 3.6–5)
RBC # BLD AUTO: 4.35 MILLION/UL (ref 4.5–5.9)
SODIUM SERPL-SCNC: 138 MMOL/L (ref 137–147)
WBC # BLD AUTO: 6.4 THOUSAND/UL (ref 4.5–11)

## 2020-04-06 PROCEDURE — 97130 THER IVNTJ EA ADDL 15 MIN: CPT

## 2020-04-06 PROCEDURE — 97129 THER IVNTJ 1ST 15 MIN: CPT

## 2020-04-06 PROCEDURE — 80048 BASIC METABOLIC PNL TOTAL CA: CPT | Performed by: NURSE PRACTITIONER

## 2020-04-06 PROCEDURE — 99232 SBSQ HOSP IP/OBS MODERATE 35: CPT | Performed by: NURSE PRACTITIONER

## 2020-04-06 PROCEDURE — NC001 PR NO CHARGE: Performed by: NURSE PRACTITIONER

## 2020-04-06 PROCEDURE — 97112 NEUROMUSCULAR REEDUCATION: CPT

## 2020-04-06 PROCEDURE — 97116 GAIT TRAINING THERAPY: CPT

## 2020-04-06 PROCEDURE — 85025 COMPLETE CBC W/AUTO DIFF WBC: CPT | Performed by: PHYSICAL MEDICINE & REHABILITATION

## 2020-04-06 PROCEDURE — 97530 THERAPEUTIC ACTIVITIES: CPT

## 2020-04-06 PROCEDURE — 99232 SBSQ HOSP IP/OBS MODERATE 35: CPT | Performed by: PHYSICAL MEDICINE & REHABILITATION

## 2020-04-06 RX ADMIN — CLOPIDOGREL BISULFATE 75 MG: 75 TABLET ORAL at 08:22

## 2020-04-06 RX ADMIN — DOCUSATE SODIUM 100 MG: 100 CAPSULE, LIQUID FILLED ORAL at 17:25

## 2020-04-06 RX ADMIN — ASPIRIN 81 MG: 81 TABLET ORAL at 08:22

## 2020-04-06 RX ADMIN — DOCUSATE SODIUM 100 MG: 100 CAPSULE, LIQUID FILLED ORAL at 08:22

## 2020-04-06 RX ADMIN — SENNOSIDES 17.2 MG: 8.6 TABLET, FILM COATED ORAL at 21:19

## 2020-04-06 RX ADMIN — LISINOPRIL 40 MG: 20 TABLET ORAL at 08:23

## 2020-04-06 RX ADMIN — ATORVASTATIN CALCIUM 80 MG: 80 TABLET, FILM COATED ORAL at 17:25

## 2020-04-06 RX ADMIN — ENOXAPARIN SODIUM 40 MG: 100 INJECTION SUBCUTANEOUS at 08:22

## 2020-04-06 RX ADMIN — MELATONIN TAB 3 MG 3 MG: 3 TAB at 22:43

## 2020-04-06 RX ADMIN — METOPROLOL SUCCINATE 100 MG: 50 TABLET, EXTENDED RELEASE ORAL at 08:22

## 2020-04-07 PROCEDURE — 97112 NEUROMUSCULAR REEDUCATION: CPT

## 2020-04-07 PROCEDURE — 97110 THERAPEUTIC EXERCISES: CPT

## 2020-04-07 PROCEDURE — 97530 THERAPEUTIC ACTIVITIES: CPT

## 2020-04-07 PROCEDURE — 99232 SBSQ HOSP IP/OBS MODERATE 35: CPT | Performed by: PHYSICAL MEDICINE & REHABILITATION

## 2020-04-07 PROCEDURE — 97535 SELF CARE MNGMENT TRAINING: CPT

## 2020-04-07 PROCEDURE — 99232 SBSQ HOSP IP/OBS MODERATE 35: CPT | Performed by: NURSE PRACTITIONER

## 2020-04-07 PROCEDURE — NC001 PR NO CHARGE: Performed by: NURSE PRACTITIONER

## 2020-04-07 PROCEDURE — 97116 GAIT TRAINING THERAPY: CPT

## 2020-04-07 RX ADMIN — ASPIRIN 81 MG: 81 TABLET ORAL at 09:36

## 2020-04-07 RX ADMIN — METOPROLOL SUCCINATE 100 MG: 50 TABLET, EXTENDED RELEASE ORAL at 09:36

## 2020-04-07 RX ADMIN — CLOPIDOGREL BISULFATE 75 MG: 75 TABLET ORAL at 09:36

## 2020-04-07 RX ADMIN — POLYETHYLENE GLYCOL 3350 17 G: 17 POWDER, FOR SOLUTION ORAL at 09:36

## 2020-04-07 RX ADMIN — ENOXAPARIN SODIUM 40 MG: 100 INJECTION SUBCUTANEOUS at 09:35

## 2020-04-07 RX ADMIN — DOCUSATE SODIUM 100 MG: 100 CAPSULE, LIQUID FILLED ORAL at 17:20

## 2020-04-07 RX ADMIN — ATORVASTATIN CALCIUM 80 MG: 80 TABLET, FILM COATED ORAL at 17:20

## 2020-04-07 RX ADMIN — LISINOPRIL 40 MG: 20 TABLET ORAL at 09:35

## 2020-04-07 RX ADMIN — DOCUSATE SODIUM 100 MG: 100 CAPSULE, LIQUID FILLED ORAL at 09:35

## 2020-04-08 PROCEDURE — 97535 SELF CARE MNGMENT TRAINING: CPT

## 2020-04-08 PROCEDURE — 97130 THER IVNTJ EA ADDL 15 MIN: CPT

## 2020-04-08 PROCEDURE — 97116 GAIT TRAINING THERAPY: CPT

## 2020-04-08 PROCEDURE — 99231 SBSQ HOSP IP/OBS SF/LOW 25: CPT | Performed by: NURSE PRACTITIONER

## 2020-04-08 PROCEDURE — NC001 PR NO CHARGE: Performed by: NURSE PRACTITIONER

## 2020-04-08 PROCEDURE — 97129 THER IVNTJ 1ST 15 MIN: CPT

## 2020-04-08 PROCEDURE — 97112 NEUROMUSCULAR REEDUCATION: CPT

## 2020-04-08 PROCEDURE — 97530 THERAPEUTIC ACTIVITIES: CPT

## 2020-04-08 PROCEDURE — 99232 SBSQ HOSP IP/OBS MODERATE 35: CPT | Performed by: PHYSICAL MEDICINE & REHABILITATION

## 2020-04-08 RX ADMIN — CLOPIDOGREL BISULFATE 75 MG: 75 TABLET ORAL at 08:35

## 2020-04-08 RX ADMIN — ATORVASTATIN CALCIUM 80 MG: 80 TABLET, FILM COATED ORAL at 17:40

## 2020-04-08 RX ADMIN — DOCUSATE SODIUM 100 MG: 100 CAPSULE, LIQUID FILLED ORAL at 08:36

## 2020-04-08 RX ADMIN — ASPIRIN 81 MG: 81 TABLET ORAL at 08:36

## 2020-04-08 RX ADMIN — METOPROLOL SUCCINATE 100 MG: 50 TABLET, EXTENDED RELEASE ORAL at 08:35

## 2020-04-08 RX ADMIN — ENOXAPARIN SODIUM 40 MG: 100 INJECTION SUBCUTANEOUS at 08:36

## 2020-04-08 RX ADMIN — POLYETHYLENE GLYCOL 3350 17 G: 17 POWDER, FOR SOLUTION ORAL at 08:36

## 2020-04-08 RX ADMIN — DOCUSATE SODIUM 100 MG: 100 CAPSULE, LIQUID FILLED ORAL at 17:40

## 2020-04-08 RX ADMIN — LISINOPRIL 40 MG: 20 TABLET ORAL at 08:35

## 2020-04-09 PROBLEM — R53.83 FATIGUE: Status: ACTIVE | Noted: 2020-04-09

## 2020-04-09 PROBLEM — D64.9 LOW HEMOGLOBIN: Status: ACTIVE | Noted: 2020-04-09

## 2020-04-09 LAB
CK SERPL-CCNC: 35 U/L (ref 55–170)
FERRITIN SERPL-MCNC: 318 NG/ML (ref 8–388)
IRON SATN MFR SERPL: 39 %
IRON SERPL-MCNC: 75 UG/DL (ref 65–175)
TIBC SERPL-MCNC: 194 UG/DL (ref 250–450)
TSH SERPL DL<=0.05 MIU/L-ACNC: 1.39 UIU/ML (ref 0.47–4.68)

## 2020-04-09 PROCEDURE — 97112 NEUROMUSCULAR REEDUCATION: CPT

## 2020-04-09 PROCEDURE — 83540 ASSAY OF IRON: CPT | Performed by: INTERNAL MEDICINE

## 2020-04-09 PROCEDURE — 97110 THERAPEUTIC EXERCISES: CPT

## 2020-04-09 PROCEDURE — 83550 IRON BINDING TEST: CPT | Performed by: INTERNAL MEDICINE

## 2020-04-09 PROCEDURE — 97535 SELF CARE MNGMENT TRAINING: CPT

## 2020-04-09 PROCEDURE — 82550 ASSAY OF CK (CPK): CPT | Performed by: INTERNAL MEDICINE

## 2020-04-09 PROCEDURE — 99232 SBSQ HOSP IP/OBS MODERATE 35: CPT | Performed by: INTERNAL MEDICINE

## 2020-04-09 PROCEDURE — 99233 SBSQ HOSP IP/OBS HIGH 50: CPT | Performed by: PHYSICAL MEDICINE & REHABILITATION

## 2020-04-09 PROCEDURE — 84443 ASSAY THYROID STIM HORMONE: CPT | Performed by: INTERNAL MEDICINE

## 2020-04-09 PROCEDURE — 97130 THER IVNTJ EA ADDL 15 MIN: CPT

## 2020-04-09 PROCEDURE — NC001 PR NO CHARGE: Performed by: NURSE PRACTITIONER

## 2020-04-09 PROCEDURE — 97530 THERAPEUTIC ACTIVITIES: CPT

## 2020-04-09 PROCEDURE — 97129 THER IVNTJ 1ST 15 MIN: CPT

## 2020-04-09 PROCEDURE — 97116 GAIT TRAINING THERAPY: CPT

## 2020-04-09 PROCEDURE — 82728 ASSAY OF FERRITIN: CPT | Performed by: INTERNAL MEDICINE

## 2020-04-09 RX ADMIN — DOCUSATE SODIUM 100 MG: 100 CAPSULE, LIQUID FILLED ORAL at 08:33

## 2020-04-09 RX ADMIN — LISINOPRIL 40 MG: 20 TABLET ORAL at 08:32

## 2020-04-09 RX ADMIN — ENOXAPARIN SODIUM 40 MG: 100 INJECTION SUBCUTANEOUS at 08:33

## 2020-04-09 RX ADMIN — CLOPIDOGREL BISULFATE 75 MG: 75 TABLET ORAL at 08:32

## 2020-04-09 RX ADMIN — ASPIRIN 81 MG: 81 TABLET ORAL at 08:33

## 2020-04-09 RX ADMIN — SENNOSIDES 17.2 MG: 8.6 TABLET, FILM COATED ORAL at 22:12

## 2020-04-09 RX ADMIN — DOCUSATE SODIUM 100 MG: 100 CAPSULE, LIQUID FILLED ORAL at 17:00

## 2020-04-09 RX ADMIN — POLYETHYLENE GLYCOL 3350 17 G: 17 POWDER, FOR SOLUTION ORAL at 08:33

## 2020-04-09 RX ADMIN — ATORVASTATIN CALCIUM 80 MG: 80 TABLET, FILM COATED ORAL at 16:59

## 2020-04-09 RX ADMIN — METOPROLOL SUCCINATE 100 MG: 50 TABLET, EXTENDED RELEASE ORAL at 08:32

## 2020-04-10 LAB — HEMOCCULT STL QL: NEGATIVE

## 2020-04-10 PROCEDURE — 99232 SBSQ HOSP IP/OBS MODERATE 35: CPT | Performed by: PHYSICAL MEDICINE & REHABILITATION

## 2020-04-10 PROCEDURE — 97112 NEUROMUSCULAR REEDUCATION: CPT

## 2020-04-10 PROCEDURE — 97129 THER IVNTJ 1ST 15 MIN: CPT

## 2020-04-10 PROCEDURE — 97110 THERAPEUTIC EXERCISES: CPT

## 2020-04-10 PROCEDURE — 97130 THER IVNTJ EA ADDL 15 MIN: CPT

## 2020-04-10 PROCEDURE — 97116 GAIT TRAINING THERAPY: CPT

## 2020-04-10 PROCEDURE — 99231 SBSQ HOSP IP/OBS SF/LOW 25: CPT | Performed by: NURSE PRACTITIONER

## 2020-04-10 PROCEDURE — 97530 THERAPEUTIC ACTIVITIES: CPT

## 2020-04-10 PROCEDURE — 82272 OCCULT BLD FECES 1-3 TESTS: CPT | Performed by: INTERNAL MEDICINE

## 2020-04-10 PROCEDURE — 97535 SELF CARE MNGMENT TRAINING: CPT

## 2020-04-10 RX ORDER — METOPROLOL SUCCINATE 50 MG/1
50 TABLET, EXTENDED RELEASE ORAL DAILY
Status: DISCONTINUED | OUTPATIENT
Start: 2020-04-11 | End: 2020-04-14 | Stop reason: HOSPADM

## 2020-04-10 RX ADMIN — LISINOPRIL 40 MG: 20 TABLET ORAL at 08:10

## 2020-04-10 RX ADMIN — CLOPIDOGREL BISULFATE 75 MG: 75 TABLET ORAL at 08:08

## 2020-04-10 RX ADMIN — ATORVASTATIN CALCIUM 80 MG: 80 TABLET, FILM COATED ORAL at 17:38

## 2020-04-10 RX ADMIN — ENOXAPARIN SODIUM 40 MG: 100 INJECTION SUBCUTANEOUS at 08:10

## 2020-04-10 RX ADMIN — DOCUSATE SODIUM 100 MG: 100 CAPSULE, LIQUID FILLED ORAL at 08:08

## 2020-04-10 RX ADMIN — ASPIRIN 81 MG: 81 TABLET ORAL at 08:08

## 2020-04-11 PROCEDURE — 97110 THERAPEUTIC EXERCISES: CPT

## 2020-04-11 PROCEDURE — 97530 THERAPEUTIC ACTIVITIES: CPT

## 2020-04-11 PROCEDURE — 99232 SBSQ HOSP IP/OBS MODERATE 35: CPT | Performed by: NURSE PRACTITIONER

## 2020-04-11 PROCEDURE — 97116 GAIT TRAINING THERAPY: CPT

## 2020-04-11 PROCEDURE — 97535 SELF CARE MNGMENT TRAINING: CPT

## 2020-04-11 RX ADMIN — LISINOPRIL 40 MG: 20 TABLET ORAL at 08:22

## 2020-04-11 RX ADMIN — CLOPIDOGREL BISULFATE 75 MG: 75 TABLET ORAL at 08:22

## 2020-04-11 RX ADMIN — ATORVASTATIN CALCIUM 80 MG: 80 TABLET, FILM COATED ORAL at 16:37

## 2020-04-11 RX ADMIN — ASPIRIN 81 MG: 81 TABLET ORAL at 08:22

## 2020-04-11 RX ADMIN — ENOXAPARIN SODIUM 40 MG: 100 INJECTION SUBCUTANEOUS at 08:22

## 2020-04-11 RX ADMIN — METOPROLOL SUCCINATE 50 MG: 50 TABLET, EXTENDED RELEASE ORAL at 08:22

## 2020-04-12 PROCEDURE — 97530 THERAPEUTIC ACTIVITIES: CPT

## 2020-04-12 PROCEDURE — 97116 GAIT TRAINING THERAPY: CPT

## 2020-04-12 PROCEDURE — 99231 SBSQ HOSP IP/OBS SF/LOW 25: CPT | Performed by: NURSE PRACTITIONER

## 2020-04-12 PROCEDURE — 97535 SELF CARE MNGMENT TRAINING: CPT

## 2020-04-12 PROCEDURE — 97112 NEUROMUSCULAR REEDUCATION: CPT

## 2020-04-12 RX ADMIN — CLOPIDOGREL BISULFATE 75 MG: 75 TABLET ORAL at 08:02

## 2020-04-12 RX ADMIN — ASPIRIN 81 MG: 81 TABLET ORAL at 08:02

## 2020-04-12 RX ADMIN — ENOXAPARIN SODIUM 40 MG: 100 INJECTION SUBCUTANEOUS at 08:02

## 2020-04-12 RX ADMIN — LISINOPRIL 40 MG: 20 TABLET ORAL at 08:02

## 2020-04-12 RX ADMIN — METOPROLOL SUCCINATE 50 MG: 50 TABLET, EXTENDED RELEASE ORAL at 08:02

## 2020-04-12 RX ADMIN — ATORVASTATIN CALCIUM 80 MG: 80 TABLET, FILM COATED ORAL at 16:42

## 2020-04-13 LAB
ALBUMIN SERPL BCP-MCNC: 3.5 G/DL (ref 3–5.2)
ALP SERPL-CCNC: 77 U/L (ref 43–122)
ALT SERPL W P-5'-P-CCNC: 33 U/L (ref 9–52)
ANION GAP SERPL CALCULATED.3IONS-SCNC: 6 MMOL/L (ref 5–14)
AST SERPL W P-5'-P-CCNC: 31 U/L (ref 17–59)
BASOPHILS # BLD AUTO: 0 THOUSANDS/ΜL (ref 0–0.1)
BASOPHILS NFR BLD AUTO: 1 % (ref 0–1)
BILIRUB SERPL-MCNC: 0.4 MG/DL
BUN SERPL-MCNC: 12 MG/DL (ref 5–25)
CALCIUM SERPL-MCNC: 8.9 MG/DL (ref 8.4–10.2)
CHLORIDE SERPL-SCNC: 103 MMOL/L (ref 97–108)
CO2 SERPL-SCNC: 30 MMOL/L (ref 22–30)
CREAT SERPL-MCNC: 0.8 MG/DL (ref 0.7–1.5)
EOSINOPHIL # BLD AUTO: 0.1 THOUSAND/ΜL (ref 0–0.4)
EOSINOPHIL NFR BLD AUTO: 2 % (ref 0–6)
ERYTHROCYTE [DISTWIDTH] IN BLOOD BY AUTOMATED COUNT: 13.4 %
GFR SERPL CREATININE-BSD FRML MDRD: 87 ML/MIN/1.73SQ M
GLUCOSE P FAST SERPL-MCNC: 105 MG/DL (ref 70–99)
GLUCOSE SERPL-MCNC: 105 MG/DL (ref 70–99)
HCT VFR BLD AUTO: 37.7 % (ref 41–53)
HGB BLD-MCNC: 13 G/DL (ref 13.5–17.5)
LYMPHOCYTES # BLD AUTO: 2.1 THOUSANDS/ΜL (ref 0.5–4)
LYMPHOCYTES NFR BLD AUTO: 35 % (ref 25–45)
MCH RBC QN AUTO: 29.9 PG (ref 26–34)
MCHC RBC AUTO-ENTMCNC: 34.5 G/DL (ref 31–36)
MCV RBC AUTO: 87 FL (ref 80–100)
MONOCYTES # BLD AUTO: 0.7 THOUSAND/ΜL (ref 0.2–0.9)
MONOCYTES NFR BLD AUTO: 11 % (ref 1–10)
NEUTROPHILS # BLD AUTO: 3.1 THOUSANDS/ΜL (ref 1.8–7.8)
NEUTS SEG NFR BLD AUTO: 51 % (ref 45–65)
PLATELET # BLD AUTO: 234 THOUSANDS/UL (ref 150–450)
PMV BLD AUTO: 8.7 FL (ref 8.9–12.7)
POTASSIUM SERPL-SCNC: 4.4 MMOL/L (ref 3.6–5)
PROT SERPL-MCNC: 6.4 G/DL (ref 5.9–8.4)
RBC # BLD AUTO: 4.35 MILLION/UL (ref 4.5–5.9)
SODIUM SERPL-SCNC: 139 MMOL/L (ref 137–147)
WBC # BLD AUTO: 6.1 THOUSAND/UL (ref 4.5–11)

## 2020-04-13 PROCEDURE — 97530 THERAPEUTIC ACTIVITIES: CPT

## 2020-04-13 PROCEDURE — 99231 SBSQ HOSP IP/OBS SF/LOW 25: CPT | Performed by: FAMILY MEDICINE

## 2020-04-13 PROCEDURE — 80053 COMPREHEN METABOLIC PANEL: CPT | Performed by: NURSE PRACTITIONER

## 2020-04-13 PROCEDURE — 97535 SELF CARE MNGMENT TRAINING: CPT

## 2020-04-13 PROCEDURE — 97112 NEUROMUSCULAR REEDUCATION: CPT

## 2020-04-13 PROCEDURE — 85025 COMPLETE CBC W/AUTO DIFF WBC: CPT | Performed by: PHYSICAL MEDICINE & REHABILITATION

## 2020-04-13 PROCEDURE — 97116 GAIT TRAINING THERAPY: CPT

## 2020-04-13 PROCEDURE — 97110 THERAPEUTIC EXERCISES: CPT

## 2020-04-13 PROCEDURE — 99232 SBSQ HOSP IP/OBS MODERATE 35: CPT | Performed by: PHYSICAL MEDICINE & REHABILITATION

## 2020-04-13 RX ORDER — ATORVASTATIN CALCIUM 80 MG/1
80 TABLET, FILM COATED ORAL
Qty: 90 TABLET | Refills: 0 | Status: SHIPPED | OUTPATIENT
Start: 2020-04-13

## 2020-04-13 RX ORDER — METOPROLOL SUCCINATE 50 MG/1
50 TABLET, EXTENDED RELEASE ORAL DAILY
Qty: 90 TABLET | Refills: 0 | Status: SHIPPED | OUTPATIENT
Start: 2020-04-14

## 2020-04-13 RX ADMIN — METOPROLOL SUCCINATE 50 MG: 50 TABLET, EXTENDED RELEASE ORAL at 08:24

## 2020-04-13 RX ADMIN — POLYETHYLENE GLYCOL 3350 17 G: 17 POWDER, FOR SOLUTION ORAL at 08:23

## 2020-04-13 RX ADMIN — ATORVASTATIN CALCIUM 80 MG: 80 TABLET, FILM COATED ORAL at 17:06

## 2020-04-13 RX ADMIN — DOCUSATE SODIUM 100 MG: 100 CAPSULE, LIQUID FILLED ORAL at 08:24

## 2020-04-13 RX ADMIN — DOCUSATE SODIUM 100 MG: 100 CAPSULE, LIQUID FILLED ORAL at 17:06

## 2020-04-13 RX ADMIN — ASPIRIN 81 MG: 81 TABLET ORAL at 08:24

## 2020-04-13 RX ADMIN — ENOXAPARIN SODIUM 40 MG: 100 INJECTION SUBCUTANEOUS at 08:25

## 2020-04-13 RX ADMIN — SENNOSIDES 17.2 MG: 8.6 TABLET, FILM COATED ORAL at 21:28

## 2020-04-13 RX ADMIN — LISINOPRIL 40 MG: 20 TABLET ORAL at 08:24

## 2020-04-13 RX ADMIN — CLOPIDOGREL BISULFATE 75 MG: 75 TABLET ORAL at 08:24

## 2020-04-14 VITALS
SYSTOLIC BLOOD PRESSURE: 124 MMHG | BODY MASS INDEX: 21.3 KG/M2 | TEMPERATURE: 96.6 F | RESPIRATION RATE: 18 BRPM | DIASTOLIC BLOOD PRESSURE: 81 MMHG | WEIGHT: 148.81 LBS | OXYGEN SATURATION: 98 % | HEART RATE: 60 BPM | HEIGHT: 70 IN

## 2020-04-14 PROCEDURE — 99239 HOSP IP/OBS DSCHRG MGMT >30: CPT | Performed by: PHYSICAL MEDICINE & REHABILITATION

## 2020-04-14 PROCEDURE — 97530 THERAPEUTIC ACTIVITIES: CPT

## 2020-04-14 RX ADMIN — DOCUSATE SODIUM 100 MG: 100 CAPSULE, LIQUID FILLED ORAL at 08:17

## 2020-04-14 RX ADMIN — CLOPIDOGREL BISULFATE 75 MG: 75 TABLET ORAL at 08:18

## 2020-04-14 RX ADMIN — METOPROLOL SUCCINATE 50 MG: 50 TABLET, EXTENDED RELEASE ORAL at 08:18

## 2020-04-14 RX ADMIN — LISINOPRIL 40 MG: 20 TABLET ORAL at 08:18

## 2020-04-14 RX ADMIN — ENOXAPARIN SODIUM 40 MG: 100 INJECTION SUBCUTANEOUS at 08:18

## 2020-04-14 RX ADMIN — ASPIRIN 81 MG: 81 TABLET ORAL at 08:17

## 2020-04-16 ENCOUNTER — EVALUATION (OUTPATIENT)
Dept: PHYSICAL THERAPY | Facility: REHABILITATION | Age: 75
End: 2020-04-16
Payer: COMMERCIAL

## 2020-04-16 ENCOUNTER — EVALUATION (OUTPATIENT)
Dept: OCCUPATIONAL THERAPY | Facility: REHABILITATION | Age: 75
End: 2020-04-16
Payer: COMMERCIAL

## 2020-04-16 DIAGNOSIS — I63.81 LACUNAR INFARCTION (HCC): Primary | ICD-10-CM

## 2020-04-16 DIAGNOSIS — I63.81 ACUTE LACUNAR STROKE (HCC): Primary | ICD-10-CM

## 2020-04-16 PROCEDURE — 97162 PT EVAL MOD COMPLEX 30 MIN: CPT | Performed by: PHYSICAL THERAPIST

## 2020-04-16 PROCEDURE — 97110 THERAPEUTIC EXERCISES: CPT | Performed by: PHYSICAL THERAPIST

## 2020-04-16 PROCEDURE — 97166 OT EVAL MOD COMPLEX 45 MIN: CPT | Performed by: OCCUPATIONAL THERAPIST

## 2020-04-21 ENCOUNTER — OFFICE VISIT (OUTPATIENT)
Dept: OCCUPATIONAL THERAPY | Facility: REHABILITATION | Age: 75
End: 2020-04-21
Payer: COMMERCIAL

## 2020-04-21 ENCOUNTER — OFFICE VISIT (OUTPATIENT)
Dept: PHYSICAL THERAPY | Facility: REHABILITATION | Age: 75
End: 2020-04-21
Payer: COMMERCIAL

## 2020-04-21 DIAGNOSIS — I63.81 LACUNAR INFARCTION (HCC): Primary | ICD-10-CM

## 2020-04-21 DIAGNOSIS — I63.81 ACUTE LACUNAR STROKE (HCC): Primary | ICD-10-CM

## 2020-04-21 PROCEDURE — 97112 NEUROMUSCULAR REEDUCATION: CPT | Performed by: PHYSICAL THERAPIST

## 2020-04-21 PROCEDURE — 97112 NEUROMUSCULAR REEDUCATION: CPT | Performed by: OCCUPATIONAL THERAPIST

## 2020-04-23 ENCOUNTER — OFFICE VISIT (OUTPATIENT)
Dept: PHYSICAL THERAPY | Facility: REHABILITATION | Age: 75
End: 2020-04-23
Payer: COMMERCIAL

## 2020-04-23 ENCOUNTER — OFFICE VISIT (OUTPATIENT)
Dept: OCCUPATIONAL THERAPY | Facility: REHABILITATION | Age: 75
End: 2020-04-23
Payer: COMMERCIAL

## 2020-04-23 DIAGNOSIS — I63.81 LACUNAR INFARCTION (HCC): Primary | ICD-10-CM

## 2020-04-23 DIAGNOSIS — I63.81 ACUTE LACUNAR STROKE (HCC): Primary | ICD-10-CM

## 2020-04-23 PROCEDURE — 97110 THERAPEUTIC EXERCISES: CPT | Performed by: PHYSICAL THERAPIST

## 2020-04-23 PROCEDURE — 97112 NEUROMUSCULAR REEDUCATION: CPT | Performed by: OCCUPATIONAL THERAPIST

## 2020-04-23 PROCEDURE — 97140 MANUAL THERAPY 1/> REGIONS: CPT | Performed by: OCCUPATIONAL THERAPIST

## 2020-04-23 PROCEDURE — 97112 NEUROMUSCULAR REEDUCATION: CPT | Performed by: PHYSICAL THERAPIST

## 2020-04-28 ENCOUNTER — OFFICE VISIT (OUTPATIENT)
Dept: PHYSICAL THERAPY | Facility: REHABILITATION | Age: 75
End: 2020-04-28
Payer: COMMERCIAL

## 2020-04-28 ENCOUNTER — OFFICE VISIT (OUTPATIENT)
Dept: OCCUPATIONAL THERAPY | Facility: REHABILITATION | Age: 75
End: 2020-04-28
Payer: COMMERCIAL

## 2020-04-28 DIAGNOSIS — I63.81 LACUNAR INFARCTION (HCC): Primary | ICD-10-CM

## 2020-04-28 DIAGNOSIS — I63.81 ACUTE LACUNAR STROKE (HCC): Primary | ICD-10-CM

## 2020-04-28 PROCEDURE — 97112 NEUROMUSCULAR REEDUCATION: CPT | Performed by: PHYSICAL THERAPIST

## 2020-04-28 PROCEDURE — 97112 NEUROMUSCULAR REEDUCATION: CPT | Performed by: OCCUPATIONAL THERAPIST

## 2020-04-28 PROCEDURE — 97140 MANUAL THERAPY 1/> REGIONS: CPT | Performed by: OCCUPATIONAL THERAPIST

## 2020-04-28 PROCEDURE — 97110 THERAPEUTIC EXERCISES: CPT | Performed by: PHYSICAL THERAPIST

## 2020-04-30 ENCOUNTER — OFFICE VISIT (OUTPATIENT)
Dept: PHYSICAL THERAPY | Facility: REHABILITATION | Age: 75
End: 2020-04-30
Payer: COMMERCIAL

## 2020-04-30 ENCOUNTER — OFFICE VISIT (OUTPATIENT)
Dept: OCCUPATIONAL THERAPY | Facility: REHABILITATION | Age: 75
End: 2020-04-30
Payer: COMMERCIAL

## 2020-04-30 DIAGNOSIS — I63.81 ACUTE LACUNAR STROKE (HCC): Primary | ICD-10-CM

## 2020-04-30 DIAGNOSIS — I63.81 LACUNAR INFARCTION (HCC): Primary | ICD-10-CM

## 2020-04-30 PROCEDURE — 97535 SELF CARE MNGMENT TRAINING: CPT | Performed by: OCCUPATIONAL THERAPIST

## 2020-04-30 PROCEDURE — 97112 NEUROMUSCULAR REEDUCATION: CPT | Performed by: PHYSICAL THERAPIST

## 2020-04-30 PROCEDURE — 97112 NEUROMUSCULAR REEDUCATION: CPT | Performed by: OCCUPATIONAL THERAPIST

## 2020-05-05 ENCOUNTER — OFFICE VISIT (OUTPATIENT)
Dept: PHYSICAL THERAPY | Facility: REHABILITATION | Age: 75
End: 2020-05-05
Payer: COMMERCIAL

## 2020-05-05 ENCOUNTER — OFFICE VISIT (OUTPATIENT)
Dept: OCCUPATIONAL THERAPY | Facility: REHABILITATION | Age: 75
End: 2020-05-05
Payer: COMMERCIAL

## 2020-05-05 DIAGNOSIS — I63.81 ACUTE LACUNAR STROKE (HCC): Primary | ICD-10-CM

## 2020-05-05 DIAGNOSIS — I63.81 LACUNAR INFARCTION (HCC): Primary | ICD-10-CM

## 2020-05-05 PROCEDURE — 97112 NEUROMUSCULAR REEDUCATION: CPT | Performed by: PHYSICAL THERAPIST

## 2020-05-05 PROCEDURE — 97112 NEUROMUSCULAR REEDUCATION: CPT | Performed by: OCCUPATIONAL THERAPIST

## 2020-05-05 PROCEDURE — 97530 THERAPEUTIC ACTIVITIES: CPT | Performed by: OCCUPATIONAL THERAPIST

## 2020-05-07 ENCOUNTER — OFFICE VISIT (OUTPATIENT)
Dept: OCCUPATIONAL THERAPY | Facility: REHABILITATION | Age: 75
End: 2020-05-07
Payer: COMMERCIAL

## 2020-05-07 ENCOUNTER — OFFICE VISIT (OUTPATIENT)
Dept: PHYSICAL THERAPY | Facility: REHABILITATION | Age: 75
End: 2020-05-07
Payer: COMMERCIAL

## 2020-05-07 DIAGNOSIS — I63.81 LACUNAR INFARCTION (HCC): Primary | ICD-10-CM

## 2020-05-07 DIAGNOSIS — I63.81 ACUTE LACUNAR STROKE (HCC): Primary | ICD-10-CM

## 2020-05-07 PROCEDURE — 97112 NEUROMUSCULAR REEDUCATION: CPT | Performed by: OCCUPATIONAL THERAPIST

## 2020-05-07 PROCEDURE — 97112 NEUROMUSCULAR REEDUCATION: CPT | Performed by: PHYSICAL THERAPIST

## 2020-05-07 PROCEDURE — 97110 THERAPEUTIC EXERCISES: CPT | Performed by: OCCUPATIONAL THERAPIST

## 2020-05-12 ENCOUNTER — OFFICE VISIT (OUTPATIENT)
Dept: OCCUPATIONAL THERAPY | Facility: REHABILITATION | Age: 75
End: 2020-05-12
Payer: COMMERCIAL

## 2020-05-12 ENCOUNTER — OFFICE VISIT (OUTPATIENT)
Dept: PHYSICAL THERAPY | Facility: REHABILITATION | Age: 75
End: 2020-05-12
Payer: COMMERCIAL

## 2020-05-12 DIAGNOSIS — I63.81 ACUTE LACUNAR STROKE (HCC): Primary | ICD-10-CM

## 2020-05-12 DIAGNOSIS — I63.81 LACUNAR INFARCTION (HCC): Primary | ICD-10-CM

## 2020-05-12 PROCEDURE — 97112 NEUROMUSCULAR REEDUCATION: CPT | Performed by: OCCUPATIONAL THERAPIST

## 2020-05-12 PROCEDURE — 97140 MANUAL THERAPY 1/> REGIONS: CPT

## 2020-05-14 ENCOUNTER — EVALUATION (OUTPATIENT)
Dept: PHYSICAL THERAPY | Facility: REHABILITATION | Age: 75
End: 2020-05-14
Payer: COMMERCIAL

## 2020-05-14 ENCOUNTER — EVALUATION (OUTPATIENT)
Dept: OCCUPATIONAL THERAPY | Facility: REHABILITATION | Age: 75
End: 2020-05-14
Payer: COMMERCIAL

## 2020-05-14 DIAGNOSIS — I63.81 LACUNAR INFARCTION (HCC): Primary | ICD-10-CM

## 2020-05-14 DIAGNOSIS — I63.81 ACUTE LACUNAR STROKE (HCC): Primary | ICD-10-CM

## 2020-05-14 PROCEDURE — 97112 NEUROMUSCULAR REEDUCATION: CPT | Performed by: PHYSICAL THERAPIST

## 2020-05-14 PROCEDURE — 97112 NEUROMUSCULAR REEDUCATION: CPT | Performed by: OCCUPATIONAL THERAPIST

## 2020-05-19 ENCOUNTER — OFFICE VISIT (OUTPATIENT)
Dept: OCCUPATIONAL THERAPY | Facility: REHABILITATION | Age: 75
End: 2020-05-19
Payer: COMMERCIAL

## 2020-05-19 ENCOUNTER — OFFICE VISIT (OUTPATIENT)
Dept: PHYSICAL THERAPY | Facility: REHABILITATION | Age: 75
End: 2020-05-19
Payer: COMMERCIAL

## 2020-05-19 DIAGNOSIS — I63.81 LACUNAR INFARCTION (HCC): Primary | ICD-10-CM

## 2020-05-19 DIAGNOSIS — I63.81 ACUTE LACUNAR STROKE (HCC): Primary | ICD-10-CM

## 2020-05-19 PROCEDURE — 97530 THERAPEUTIC ACTIVITIES: CPT | Performed by: OCCUPATIONAL THERAPIST

## 2020-05-19 PROCEDURE — 97112 NEUROMUSCULAR REEDUCATION: CPT | Performed by: PHYSICAL THERAPIST

## 2020-05-19 PROCEDURE — 97112 NEUROMUSCULAR REEDUCATION: CPT | Performed by: OCCUPATIONAL THERAPIST

## 2020-05-21 ENCOUNTER — OFFICE VISIT (OUTPATIENT)
Dept: PHYSICAL THERAPY | Facility: REHABILITATION | Age: 75
End: 2020-05-21
Payer: COMMERCIAL

## 2020-05-21 ENCOUNTER — OFFICE VISIT (OUTPATIENT)
Dept: OCCUPATIONAL THERAPY | Facility: REHABILITATION | Age: 75
End: 2020-05-21
Payer: COMMERCIAL

## 2020-05-21 DIAGNOSIS — I63.81 ACUTE LACUNAR STROKE (HCC): Primary | ICD-10-CM

## 2020-05-21 DIAGNOSIS — I63.81 LACUNAR INFARCTION (HCC): Primary | ICD-10-CM

## 2020-05-21 PROCEDURE — 97530 THERAPEUTIC ACTIVITIES: CPT | Performed by: OCCUPATIONAL THERAPIST

## 2020-05-21 PROCEDURE — 97112 NEUROMUSCULAR REEDUCATION: CPT | Performed by: OCCUPATIONAL THERAPIST

## 2020-05-21 PROCEDURE — 97112 NEUROMUSCULAR REEDUCATION: CPT | Performed by: PHYSICAL THERAPIST

## 2020-05-26 ENCOUNTER — OFFICE VISIT (OUTPATIENT)
Dept: OCCUPATIONAL THERAPY | Facility: REHABILITATION | Age: 75
End: 2020-05-26
Payer: COMMERCIAL

## 2020-05-26 ENCOUNTER — OFFICE VISIT (OUTPATIENT)
Dept: PHYSICAL THERAPY | Facility: REHABILITATION | Age: 75
End: 2020-05-26
Payer: COMMERCIAL

## 2020-05-26 DIAGNOSIS — I63.81 ACUTE LACUNAR STROKE (HCC): Primary | ICD-10-CM

## 2020-05-26 DIAGNOSIS — I63.81 LACUNAR INFARCTION (HCC): Primary | ICD-10-CM

## 2020-05-26 PROCEDURE — 97112 NEUROMUSCULAR REEDUCATION: CPT

## 2020-05-26 PROCEDURE — 97112 NEUROMUSCULAR REEDUCATION: CPT | Performed by: OCCUPATIONAL THERAPIST

## 2020-05-26 PROCEDURE — 97530 THERAPEUTIC ACTIVITIES: CPT | Performed by: OCCUPATIONAL THERAPIST

## 2020-05-26 PROCEDURE — 97110 THERAPEUTIC EXERCISES: CPT | Performed by: OCCUPATIONAL THERAPIST

## 2020-05-28 ENCOUNTER — OFFICE VISIT (OUTPATIENT)
Dept: OCCUPATIONAL THERAPY | Facility: REHABILITATION | Age: 75
End: 2020-05-28
Payer: COMMERCIAL

## 2020-05-28 ENCOUNTER — OFFICE VISIT (OUTPATIENT)
Dept: PHYSICAL THERAPY | Facility: REHABILITATION | Age: 75
End: 2020-05-28
Payer: COMMERCIAL

## 2020-05-28 DIAGNOSIS — I63.81 ACUTE LACUNAR STROKE (HCC): Primary | ICD-10-CM

## 2020-05-28 DIAGNOSIS — I63.81 LACUNAR INFARCTION (HCC): Primary | ICD-10-CM

## 2020-05-28 PROCEDURE — 97112 NEUROMUSCULAR REEDUCATION: CPT | Performed by: PHYSICAL THERAPIST

## 2020-05-28 PROCEDURE — 97112 NEUROMUSCULAR REEDUCATION: CPT | Performed by: OCCUPATIONAL THERAPIST

## 2020-05-28 PROCEDURE — 97110 THERAPEUTIC EXERCISES: CPT | Performed by: OCCUPATIONAL THERAPIST

## 2020-06-02 ENCOUNTER — OFFICE VISIT (OUTPATIENT)
Dept: PHYSICAL THERAPY | Facility: REHABILITATION | Age: 75
End: 2020-06-02
Payer: COMMERCIAL

## 2020-06-02 ENCOUNTER — OFFICE VISIT (OUTPATIENT)
Dept: OCCUPATIONAL THERAPY | Facility: REHABILITATION | Age: 75
End: 2020-06-02
Payer: COMMERCIAL

## 2020-06-02 DIAGNOSIS — I63.81 LACUNAR INFARCTION (HCC): Primary | ICD-10-CM

## 2020-06-02 DIAGNOSIS — I63.81 ACUTE LACUNAR STROKE (HCC): Primary | ICD-10-CM

## 2020-06-02 PROCEDURE — 97110 THERAPEUTIC EXERCISES: CPT | Performed by: OCCUPATIONAL THERAPIST

## 2020-06-02 PROCEDURE — 97112 NEUROMUSCULAR REEDUCATION: CPT | Performed by: PHYSICAL THERAPIST

## 2020-06-02 PROCEDURE — 97112 NEUROMUSCULAR REEDUCATION: CPT | Performed by: OCCUPATIONAL THERAPIST

## 2020-06-04 ENCOUNTER — OFFICE VISIT (OUTPATIENT)
Dept: OCCUPATIONAL THERAPY | Facility: REHABILITATION | Age: 75
End: 2020-06-04
Payer: COMMERCIAL

## 2020-06-04 ENCOUNTER — OFFICE VISIT (OUTPATIENT)
Dept: PHYSICAL THERAPY | Facility: REHABILITATION | Age: 75
End: 2020-06-04
Payer: COMMERCIAL

## 2020-06-04 DIAGNOSIS — I63.81 ACUTE LACUNAR STROKE (HCC): Primary | ICD-10-CM

## 2020-06-04 DIAGNOSIS — I63.81 LACUNAR INFARCTION (HCC): Primary | ICD-10-CM

## 2020-06-04 PROCEDURE — 97112 NEUROMUSCULAR REEDUCATION: CPT | Performed by: PHYSICAL THERAPIST

## 2020-06-04 PROCEDURE — 97535 SELF CARE MNGMENT TRAINING: CPT | Performed by: OCCUPATIONAL THERAPIST

## 2020-06-04 PROCEDURE — 97112 NEUROMUSCULAR REEDUCATION: CPT | Performed by: OCCUPATIONAL THERAPIST

## 2020-06-04 PROCEDURE — 97110 THERAPEUTIC EXERCISES: CPT | Performed by: OCCUPATIONAL THERAPIST

## 2020-06-09 ENCOUNTER — OFFICE VISIT (OUTPATIENT)
Dept: OCCUPATIONAL THERAPY | Facility: REHABILITATION | Age: 75
End: 2020-06-09
Payer: COMMERCIAL

## 2020-06-09 ENCOUNTER — EVALUATION (OUTPATIENT)
Dept: PHYSICAL THERAPY | Facility: REHABILITATION | Age: 75
End: 2020-06-09
Payer: COMMERCIAL

## 2020-06-09 DIAGNOSIS — I63.81 ACUTE LACUNAR STROKE (HCC): Primary | ICD-10-CM

## 2020-06-09 DIAGNOSIS — I63.81 LACUNAR INFARCTION (HCC): Primary | ICD-10-CM

## 2020-06-09 PROCEDURE — 97112 NEUROMUSCULAR REEDUCATION: CPT | Performed by: PHYSICAL THERAPIST

## 2020-06-09 PROCEDURE — 97112 NEUROMUSCULAR REEDUCATION: CPT | Performed by: OCCUPATIONAL THERAPIST

## 2020-06-09 PROCEDURE — 97110 THERAPEUTIC EXERCISES: CPT | Performed by: OCCUPATIONAL THERAPIST

## 2020-06-11 ENCOUNTER — OFFICE VISIT (OUTPATIENT)
Dept: PHYSICAL THERAPY | Facility: REHABILITATION | Age: 75
End: 2020-06-11
Payer: COMMERCIAL

## 2020-06-11 ENCOUNTER — EVALUATION (OUTPATIENT)
Dept: OCCUPATIONAL THERAPY | Facility: REHABILITATION | Age: 75
End: 2020-06-11
Payer: COMMERCIAL

## 2020-06-11 DIAGNOSIS — I63.81 LACUNAR INFARCTION (HCC): Primary | ICD-10-CM

## 2020-06-11 DIAGNOSIS — I63.81 ACUTE LACUNAR STROKE (HCC): Primary | ICD-10-CM

## 2020-06-11 PROCEDURE — 97530 THERAPEUTIC ACTIVITIES: CPT | Performed by: OCCUPATIONAL THERAPIST

## 2020-06-11 PROCEDURE — 97112 NEUROMUSCULAR REEDUCATION: CPT

## 2020-06-11 PROCEDURE — 97110 THERAPEUTIC EXERCISES: CPT

## 2020-06-11 PROCEDURE — 97112 NEUROMUSCULAR REEDUCATION: CPT | Performed by: OCCUPATIONAL THERAPIST

## 2020-06-16 ENCOUNTER — OFFICE VISIT (OUTPATIENT)
Dept: PHYSICAL THERAPY | Facility: REHABILITATION | Age: 75
End: 2020-06-16
Payer: COMMERCIAL

## 2020-06-16 ENCOUNTER — OFFICE VISIT (OUTPATIENT)
Dept: OCCUPATIONAL THERAPY | Facility: REHABILITATION | Age: 75
End: 2020-06-16
Payer: COMMERCIAL

## 2020-06-16 DIAGNOSIS — I63.81 ACUTE LACUNAR STROKE (HCC): Primary | ICD-10-CM

## 2020-06-16 DIAGNOSIS — I63.81 LACUNAR INFARCTION (HCC): Primary | ICD-10-CM

## 2020-06-16 PROCEDURE — 97112 NEUROMUSCULAR REEDUCATION: CPT | Performed by: OCCUPATIONAL THERAPIST

## 2020-06-16 PROCEDURE — 97110 THERAPEUTIC EXERCISES: CPT | Performed by: OCCUPATIONAL THERAPIST

## 2020-06-16 PROCEDURE — 97112 NEUROMUSCULAR REEDUCATION: CPT | Performed by: PHYSICAL THERAPIST

## 2020-06-18 ENCOUNTER — OFFICE VISIT (OUTPATIENT)
Dept: PHYSICAL THERAPY | Facility: REHABILITATION | Age: 75
End: 2020-06-18
Payer: COMMERCIAL

## 2020-06-18 ENCOUNTER — OFFICE VISIT (OUTPATIENT)
Dept: OCCUPATIONAL THERAPY | Facility: REHABILITATION | Age: 75
End: 2020-06-18
Payer: COMMERCIAL

## 2020-06-18 DIAGNOSIS — I63.81 LACUNAR INFARCTION (HCC): Primary | ICD-10-CM

## 2020-06-18 DIAGNOSIS — I63.81 ACUTE LACUNAR STROKE (HCC): Primary | ICD-10-CM

## 2020-06-18 PROCEDURE — 97112 NEUROMUSCULAR REEDUCATION: CPT | Performed by: PHYSICAL THERAPIST

## 2020-06-18 PROCEDURE — 97110 THERAPEUTIC EXERCISES: CPT | Performed by: OCCUPATIONAL THERAPIST

## 2020-06-18 PROCEDURE — 97112 NEUROMUSCULAR REEDUCATION: CPT | Performed by: OCCUPATIONAL THERAPIST

## 2020-06-23 ENCOUNTER — OFFICE VISIT (OUTPATIENT)
Dept: OCCUPATIONAL THERAPY | Facility: REHABILITATION | Age: 75
End: 2020-06-23
Payer: COMMERCIAL

## 2020-06-23 ENCOUNTER — OFFICE VISIT (OUTPATIENT)
Dept: PHYSICAL THERAPY | Facility: REHABILITATION | Age: 75
End: 2020-06-23
Payer: COMMERCIAL

## 2020-06-23 DIAGNOSIS — I63.81 ACUTE LACUNAR STROKE (HCC): Primary | ICD-10-CM

## 2020-06-23 DIAGNOSIS — I63.81 LACUNAR INFARCTION (HCC): Primary | ICD-10-CM

## 2020-06-23 PROCEDURE — 97112 NEUROMUSCULAR REEDUCATION: CPT | Performed by: PHYSICAL THERAPIST

## 2020-06-23 PROCEDURE — 97530 THERAPEUTIC ACTIVITIES: CPT | Performed by: OCCUPATIONAL THERAPIST

## 2020-06-23 PROCEDURE — 97110 THERAPEUTIC EXERCISES: CPT | Performed by: OCCUPATIONAL THERAPIST

## 2020-06-23 PROCEDURE — 97112 NEUROMUSCULAR REEDUCATION: CPT | Performed by: OCCUPATIONAL THERAPIST

## 2020-06-25 ENCOUNTER — OFFICE VISIT (OUTPATIENT)
Dept: PHYSICAL THERAPY | Facility: REHABILITATION | Age: 75
End: 2020-06-25
Payer: COMMERCIAL

## 2020-06-25 ENCOUNTER — OFFICE VISIT (OUTPATIENT)
Dept: OCCUPATIONAL THERAPY | Facility: REHABILITATION | Age: 75
End: 2020-06-25
Payer: COMMERCIAL

## 2020-06-25 DIAGNOSIS — I63.81 ACUTE LACUNAR STROKE (HCC): Primary | ICD-10-CM

## 2020-06-25 DIAGNOSIS — I63.81 LACUNAR INFARCTION (HCC): Primary | ICD-10-CM

## 2020-06-25 PROCEDURE — 97110 THERAPEUTIC EXERCISES: CPT | Performed by: OCCUPATIONAL THERAPIST

## 2020-06-25 PROCEDURE — 97112 NEUROMUSCULAR REEDUCATION: CPT | Performed by: PHYSICAL THERAPIST

## 2020-06-25 PROCEDURE — 97530 THERAPEUTIC ACTIVITIES: CPT | Performed by: OCCUPATIONAL THERAPIST

## 2020-06-30 ENCOUNTER — OFFICE VISIT (OUTPATIENT)
Dept: PHYSICAL THERAPY | Facility: REHABILITATION | Age: 75
End: 2020-06-30
Payer: COMMERCIAL

## 2020-06-30 ENCOUNTER — OFFICE VISIT (OUTPATIENT)
Dept: OCCUPATIONAL THERAPY | Facility: REHABILITATION | Age: 75
End: 2020-06-30
Payer: COMMERCIAL

## 2020-06-30 DIAGNOSIS — I63.81 LACUNAR INFARCTION (HCC): Primary | ICD-10-CM

## 2020-06-30 DIAGNOSIS — I63.81 ACUTE LACUNAR STROKE (HCC): Primary | ICD-10-CM

## 2020-06-30 PROCEDURE — 97110 THERAPEUTIC EXERCISES: CPT | Performed by: OCCUPATIONAL THERAPIST

## 2020-06-30 PROCEDURE — 97112 NEUROMUSCULAR REEDUCATION: CPT | Performed by: OCCUPATIONAL THERAPIST

## 2020-06-30 PROCEDURE — 97112 NEUROMUSCULAR REEDUCATION: CPT | Performed by: PHYSICAL THERAPIST

## 2020-07-02 ENCOUNTER — OFFICE VISIT (OUTPATIENT)
Dept: PHYSICAL THERAPY | Facility: REHABILITATION | Age: 75
End: 2020-07-02
Payer: COMMERCIAL

## 2020-07-02 ENCOUNTER — OFFICE VISIT (OUTPATIENT)
Dept: OCCUPATIONAL THERAPY | Facility: REHABILITATION | Age: 75
End: 2020-07-02
Payer: COMMERCIAL

## 2020-07-02 DIAGNOSIS — I63.81 ACUTE LACUNAR STROKE (HCC): Primary | ICD-10-CM

## 2020-07-02 DIAGNOSIS — I63.81 LACUNAR INFARCTION (HCC): Primary | ICD-10-CM

## 2020-07-02 PROCEDURE — 97112 NEUROMUSCULAR REEDUCATION: CPT | Performed by: OCCUPATIONAL THERAPIST

## 2020-07-02 PROCEDURE — 97112 NEUROMUSCULAR REEDUCATION: CPT | Performed by: PHYSICAL THERAPIST

## 2020-07-02 PROCEDURE — 97110 THERAPEUTIC EXERCISES: CPT | Performed by: OCCUPATIONAL THERAPIST

## 2020-07-02 NOTE — PROGRESS NOTES
TREATMENT NOTE    Today's date: 2020  Patient name: Yael Lundberg  : 1945  MRN: [id number]  Referring provider: Gio Downey MD  Dx:   Encounter Diagnosis     ICD-10-CM    1  Lacunar infarction ([address] ) I63 [id number]      Subjective: Alfredo is using his walker, asked about mowing the lawn       Objective: See treatment diary below    Precautions: CVA, monitor BP, fall risk       2020     /85, 63 bpm /103, heart rate 57 bpm /89, heart rate 51 bpm /97, 64 bpm heart rate /87, heart rate 54 bpm     Neuromuscular re-education   Treadmill walking, 1 5 min warm-up at 1 4 mph  ___________  1 4 mph slow 60 sec  2 5 mph fast  30 sec  ___________  12 min  105 bpm average      Treadmill walking, 1 5 min warm-up at 1 4 mph  ___________  1 4 mph slow 60 sec  2 5 mph fast  30 sec  ___________  12 min  99 bpm average    Walking between cones, 6 min  Stepping over cones, 3 min    Perturbations from flat ground and 4" step, most posterior but also laterally and posterior/laterally, 8 min Treadmill walking 1 5 mph warm-up 1 5 min  Intervals 30 sec fast 2 6 mph 60 sec slow 1 5 mph for 12 min  104 bpm average, 113 bpm max, 13/20 RPE    Treadmill walking 1 5 mph warm-up 1 5 min  Intervals 30 sec fast 2 7 mph 60 sec slow 1 5 mph for 12 min  108 bpm average, 119 bpm max, 13/20 RPE    Perturbations from flat ground and 4" step, most posterior but also laterally and posterior/laterally, 6 min    Standing pushing cable column to the side with trunk rotation, hip level, 25 lbs x 8 right and left Overground walking with feedback about speed, no AD, 100 feet:  28-31 second laps, x 8, 20 sec rest in between laps    Walking weaving between cones, then tapping heel to cone without crushing it, no losses of balance requiring assist to correct, 6 min    Treadmill walking 1 5 mph warm-up 1 5 min  Intervals 30 sec fast 2 6 mph 60 sec slow 1 6 mph for 12 min    Treadmill walking 1 5 mph warm-up 1 5 min  Intervals 30 sec fast 2 6 mph 60 sec slow 1 6 mph for 12 min    Perturbations from flat ground and 4" step, most posterior but also laterally and posterior/laterally, 3 5 min Overground walking with feedback about speed, no AD, 100 feet, 30-36 seconds, 20 sec rest in between, 10 laps    Treadmill walking 1 5 mph warm-up 1 5 min  Intervals 30 sec fast 2 6 mph 60 sec slow 1 6 mph for 12 min, 102 bpm average    Treadmill walking 1 5 mph warm-up 1 4 min  Intervals 30 sec fast 2 6 mph 60 sec slow 1 6 mph for 12 min    Overground walking weaving between cones, then fast walking around clinic with arm swing, then tapping cones with feet, 3 min x 2   Overground walking with feedback about speed, no AD, CGA, 100 feet:  29 5-32 sec laps, 20 sec rest in between laps, verbal cues for speed and step lengths    Treadmill walking 1 5 mph warm-up, 30 sec fast intervals to 2 5 mph, 60 sec slower intervals 1 5 mph, use of 2 railings, 12 min     Treadmill walking 1 5 mph warm-up, 30 sec fast intervals to 2 6 -2 7 mph, 60 sec slower intervals 1 5 mph, use of 2 railings, 12 min    54/46 weight bearing right and left (used [employer] treadmill with both walking intervals)    Overground walking weaving in between cones, then tapping cones with feet, then stepping onto airex foam, then catching ball tossed by student OT, 10 min total        LE strengthening  (therapeutic exercise Standing calf stretch L calf, 1 min  Standing L quad stretch, 1 min  Standing pushing cable column to the side with trunk rotation, hip level, 25 lbs x 10 right and lef                Functional Gait Assessment 6/16/2020  2/3 Gait level surface  2/3 Change in gait speed  3/3 Gait with horizontal head turns  3/3 Gait with vertical head turns  3/3 Gait and pivot turn  1/3 Step over obstacle  0/3 Gait with narrow base of support  2/3 Gait with eyes closed  2/3 Ambulating backwards  2/3 Steps  20/30 Total score (less than 22/30 indicates increased risk of fall)       Core Movement Tasks Description of task    Sitting Normal   Sitting to Standing Requires raised surface to complete   Standing Other: increased postural sway eyes closed   Walking Stiffness on left side during swing phase gait, most frequently scuffs the left heel during midswing gait; mildly uneven step lengths   Step-up Requires use of railing; safely completes with step-to gait   Reach, grasp, manipulate Not observed   Bed mobility Not observed       Mobility Measures 4/16/2020 5/14/2020 6/23/2020   Assistive device used? Rolling Walker Rolling walker for home ambulation, exercises today completed without assistive device Rolling walker   5 Time Sit to Stand  (17" chair, arms across chest) Unable from 17" chair    With strong use of arms, 21 3 seconds 35 seconds with hands on knees for reps 3-5 Unable from 17" chair    From 19" chair, 18 3 seconds without pushing from hands   3 Meter Timed  Up & Go 22 2 seconds  21 5 seconds    18 4 seconds as fast as safely possible   18 3 seconds no assistive device 15 9 seconds no assistive device   Walking speed 0 61 meters/second 0 71 meters/second 0 73 meters/second self-selected   Functional Gait Assessment (see below) Deferred for safety 13/30 20/30   6 Minute Walk Test (100 foot circular course) 715 feet  Ending heart rate 66 beats/minute    /93, heart rate 66 bpm 810 feet completed with CGA without assistive device 905 feet with close supervision   Patient-Reported Outcome Measure: Activities-Specific Balance Confidence Scale (ABC Scale) Not completed  Not completed       ASSESSMENT:  Recommended against mowing, as we just aren't consistent enough with balance to do this consistently  We again see improved gait kinematics with faster walking 2 5 - 2 7 mph on the treadmill  We continue to see extension posturing from the left side affecting balance    Add trunk flexion with diagonal movements and cross-body reaching to home exercise program next visit  SHORT-TERM GOALS: 1 month(s)  1  Patient stands from 17 inch surface without use of hands  MET  2  Patient completes 3 meter timed up and go in 15 seconds, self-selected speed  NOT MET, progressed  3  Patient walks at least 850 feet on 6 minute walk test   NOT MET, progressed  LONG-TERM GOALS: 3 month(s)  1  Patient returns to prior level of community walking, with assistive device as needed  [address] [facility]   2  Patient walks at least 1200 feet consecutively  NOTE MET  Precautions - monitor BP; fall risk    PLAN OF CARE:  Patient will benefit from physical therapy 2 times per week for 2 months  Neuromuscular re-education, therapeutic exercises, and therapeutic activities as outlined in grids

## 2020-07-02 NOTE — PROGRESS NOTES
Daily Note     Today's date: 2020  Patient name: Rosalva Covarrubias  : 1945  MRN: [id number]  Referring provider: Lucinda Whitlock MD  Dx:   Encounter Diagnosis     ICD-10-CM    1  Acute lacunar stroke (HCC) I63 81        Start Time: 1100  Stop Time: 1200  Total time in clinic (min): 60 minutes    Subjective: "I'm doing alright"      Objective: See treatment description below    Pt performed UBE in seated position x 5 mins prograde x 5 mins retrograde at 2 2 resistance focusing on improved B/L coordination, proximal stability/mobility, muscle strength/endurance, and sustained grasp  Pt performed B/L 2 sets x 10 reps of scap retraction and 2 x 10 reps of lat pull downs in standing position w/ therabands to increase proximal and posterior strength/endurance and stability to improve participation in ADL's/IADL's and salient tasks  Pt completed B/L wrist flex/ext w/ shoulder FF at 90* utilizing #3 T-bar focusing on increased proximal stability/mobility, B/L coordination and muscle strengthening and endurance to improve success in functional task completion  Pt completed seated functional reach crossing midline activity with marble retrieval utilizing graded clothes pin and placement into pegboard  following specified pattern to focus on improving LUE proprioception, hand to target accuracy and distal strengthening  Assessment: Tolerated treatment well  Patient would benefit from continued OT    Pt tolerated increase in resistance to 2 2 on UBE well, demo improved muscle endurance for total of x 10 mins w/ 0 rest breaks and mod fatigue  Pt had decreased endurance and proximal control w/ increased theraband resistance requiring seated rest breaks between sets  Pt required mod verbal/tactile cueing to maintain upright posture and facilitation of full scap retraction   Pt demo improved proprioception and motor control w/ reduction in ataxic movements in LUE and increased accuracy w/ hand to target placement with massed  Pt displayed difficulties w/ sustained grasp as demo w/ x4 droppage      Plan: Continue per plan of care          INTERVENTION COMMENTS:  Diagnosis: Acute lacunar stroke ([facility] [address] ) [I63 81]  Precautions: HTN, [place], Glaucoma     FOTO: 49, with 47% limitation in UE function and 35% limitation in Hand function  Insurance: Payor: [employer] [facility] REP / Plan: [employer] BLUE JOURNEY [facility] REP / Product Type: Medicare HMO /   3 of 4 visits  through 06/25/2020, PN due 07/11/2020    Pt was seen by [place], OTS under direct supervision of Norm Lopez MS, OTR/L

## 2020-07-07 ENCOUNTER — EVALUATION (OUTPATIENT)
Dept: OCCUPATIONAL THERAPY | Facility: REHABILITATION | Age: 75
End: 2020-07-07
Payer: COMMERCIAL

## 2020-07-07 ENCOUNTER — OFFICE VISIT (OUTPATIENT)
Dept: PHYSICAL THERAPY | Facility: REHABILITATION | Age: 75
End: 2020-07-07
Payer: COMMERCIAL

## 2020-07-07 DIAGNOSIS — I63.81 ACUTE LACUNAR STROKE (HCC): Primary | ICD-10-CM

## 2020-07-07 DIAGNOSIS — I63.81 LACUNAR INFARCTION (HCC): Primary | ICD-10-CM

## 2020-07-07 PROCEDURE — 97110 THERAPEUTIC EXERCISES: CPT

## 2020-07-07 PROCEDURE — 97112 NEUROMUSCULAR REEDUCATION: CPT | Performed by: OCCUPATIONAL THERAPIST

## 2020-07-07 PROCEDURE — 97112 NEUROMUSCULAR REEDUCATION: CPT

## 2020-07-07 NOTE — PROGRESS NOTES
Daily Note     Today's date: 2020  Patient name: Emmanuel Shirley  : 1945  MRN: [id number]  Referring provider: Cheo Gtz MD  Dx:   Encounter Diagnosis     ICD-10-CM    1  Lacunar infarction ([facility] [address] ) I63 81        Start Time: 1002  Stop Time: 1055  Total time in clinic (min): 53 minutes    Subjective: Pt reports he is doing okay, but sister reports that their cat has been sick and the patient has not eaten since yesterday morning  He has not had an appetite because of this  Pt denies dizziness or lightheadedness at start of session         Objective: See treatment diary below    Precautions: CVA, monitor BP, fall risk       2020     /85, 63 bpm /103, heart rate 57 bpm /89, heart rate 51 bpm /97, 64 bpm heart rate /87, heart rate 54 bpm   187/107, 65 bpm   Neuromuscular re-education   Treadmill walking, 1 5 min warm-up at 1 4 mph  ___________  1 4 mph slow 60 sec  2 5 mph fast  30 sec  ___________  12 min  105 bpm average      Treadmill walking, 1 5 min warm-up at 1 4 mph  ___________  1 4 mph slow 60 sec  2 5 mph fast  30 sec  ___________  12 min  99 bpm average    Walking between cones, 6 min  Stepping over cones, 3 min    Perturbations from flat ground and 4" step, most posterior but also laterally and posterior/laterally, 8 min Treadmill walking 1 5 mph warm-up 1 5 min  Intervals 30 sec fast 2 6 mph 60 sec slow 1 5 mph for 12 min  104 bpm average, 113 bpm max, 13/20 RPE    Treadmill walking 1 5 mph warm-up 1 5 min  Intervals 30 sec fast 2 7 mph 60 sec slow 1 5 mph for 12 min  108 bpm average, 119 bpm max, 13/20 RPE    Perturbations from flat ground and 4" step, most posterior but also laterally and posterior/laterally, 6 min    Standing pushing cable column to the side with trunk rotation, hip level, 25 lbs x 8 right and left Overground walking with feedback about speed, no AD, 100 feet:  28-31 second laps, x 8, 20 sec rest in between laps    Walking weaving between cones, then tapping heel to cone without crushing it, no losses of balance requiring assist to correct, 6 min    Treadmill walking 1 5 mph warm-up 1 5 min  Intervals 30 sec fast 2 6 mph 60 sec slow 1 6 mph for 12 min    Treadmill walking 1 5 mph warm-up 1 5 min  Intervals 30 sec fast 2 6 mph 60 sec slow 1 6 mph for 12 min    Perturbations from flat ground and 4" step, most posterior but also laterally and posterior/laterally, 3 5 min Overground walking with feedback about speed, no AD, 100 feet, 30-36 seconds, 20 sec rest in between, 10 laps    Treadmill walking 1 5 mph warm-up 1 5 min  Intervals 30 sec fast 2 6 mph 60 sec slow 1 6 mph for 12 min, 102 bpm average    Treadmill walking 1 5 mph warm-up 1 4 min  Intervals 30 sec fast 2 6 mph 60 sec slow 1 6 mph for 12 min    Overground walking weaving between cones, then fast walking around clinic with arm swing, then tapping cones with feet, 3 min x 2   Overground walking with feedback about speed, no AD, CGA, 100 feet:  29 5-32 sec laps, 20 sec rest in between laps, verbal cues for speed and step lengths    Treadmill walking 1 5 mph warm-up, 30 sec fast intervals to 2 5 mph, 60 sec slower intervals 1 5 mph, use of 2 railings, 12 min     Treadmill walking 1 5 mph warm-up, 30 sec fast intervals to 2 6 -2 7 mph, 60 sec slower intervals 1 5 mph, use of 2 railings, 12 min    54/46 weight bearing right and left (used Biodex treadmill with both walking intervals)    Overground walking weaving in between cones, then tapping cones with feet, then stepping onto airex foam, then catching ball tossed by student OT, 10 min total     Overground walking with feedback about speed, no AD, CGA, 100 feet:  29 -32 sec laps, 20 sec x10    Treadmill walking 1 5 mph warm-up, 30 sec fast intervals to 2 5 mph, 60 sec slower intervals 1 5 mph, use of 2 railings, 6 min x2 w/ seated rest break    Overground walking weaving in between cones, then tapping cones with feet, then stepping onto airex foam  x10 minutes      LE strengthening  (therapeutic exercise Standing calf stretch L calf, 1 min  Standing L quad stretch, 1 min  Standing pushing cable column to the side with trunk rotation, hip level, 25 lbs x 10 right and lef   Scifit x10 minutes                   Assessment: Tolerated treatment well  Increased rest breaks provided today due to subjective report that patient has not eaten since yesterday  Pt denied any dizziness or lightheadedness, but this was frequently checked throughout the session  Scuffing is observed w/ left leg when on treadmill, he improves this somewhat w/ verbal cues, but even w/ cues scuffing is evident throughout activity  When weaving between cones and turning at end of course, pt will catching his left leg at times on the floor, causing him to lose his balance  He has been able to maintain his balance w/ stepping strategy and w/ contact guard, but will require further practice w/ turns and lateral stepping to decrease instances of LOB  Scifit used at end of session as pt reported fatigue maintaining walking on treadmill  Patient demonstrated fatigue post treatment and would benefit from continued PT      Plan: Continue per plan of care    Practice turns and lateral stepping         INTERVENTION COMMENTS:  Diagnosis: Acute lacunar stroke (HCC) [I63 81]  Precautions: HTN, [place], Glaucoma     FOTO: 49, with 47% limitation in UE function and 35% limitation in Hand function  Insurance: Payor: [employer] REP / Plan: [place] [employer] [address] REP / Product Type: Medicare HMO /   3 of 4 visits  through 06/25/2020, PN due 07/11/2020    Pt was seen by [place], OTS under direct supervision of Sury Nair MS, OTR/L

## 2020-07-07 NOTE — PROGRESS NOTES
OCCUPATIONAL THERAPY DISCHARGE SUMMARY:    07/07/2020  Cheyenne Aus  1945  [id number]  Munir Weller MD   Diagnosis ICD-10-CM Associated Orders   1  Acute lacunar stroke [facility]) I63 81          Assessment  Assessment details: See skilled analysis for further details  Skilled Analysis:  Pt is a 66 y o  male referred to Occupational Therapy s/p Acute lacunar stroke ([address] ) [I63 81]  Pt presents with L distal weakness, L FMC/prehension, paresthesias in L digits, L impaired proprioception, and L ataxia affecting hand/target precision affecting functional use of LUE  Pt will benefit from skilled Occupational Therapy services 2x/week for 6-8 weeks with focus on neuro re-ed, self-care management, thera ex, and manual tx to improve on the above deficits, increased independence with ADLs/IADLs, improve functional use of LUE, and enhance overall QOL  Pt completed a skilled Occupational Therapy Re-evaluation on this date to further assess functional progression towards goals in POC  Pt demo with  G functional progression towards goals in POC evident by improved proximal strength/stability, improved muscle endurance, improved motor control with less ataxia present, improved proprioception, improved FMC/prehension, improved B/L coordination, and improved activity tolerance increasing independence and engagement in meaningful occupations  OTR will be D/Cing Pt at this time 2*  Goals met and maximal level reached at this time with further recommendation to continue HEP to maintain functional progression achieved  Pt demo with G understanding and in agreement with further plan        Short Term Goals:  - Pt will increase proprioception of LUE hand to target for improved functional reach vision occluded with ADL tasks  x 75% accuracy 4 weeks-- MET  - Pt will demo with G carryover of Home Exercise Program to improve functional progression towards goals in Plan of care, for improved distal strength, increased independence with ADLs/IADLs, and for improved functional use of LUE  x 75% 4 weeks--  MET  - Pt will demo with G tolerance to supine, seated, and in stance exercise x 30 minutes with minimal rest breaks required for increased engagement in weekly exercise regimen and increased engagement in life roles 4 weeks--- MET  · Pt will increase LUE prehension patterns for improved tripod with utensil management with <10% droppage in 4 weeks--- MET  · Pt will tolerate IASTM for improved motor and sensory performance for overall improved hand to target with 75% accuracy 4 weeks-- Unable to complete 2* comorbitities    · Pt will increase automaticity of LUE to 75% for improved grasp release of tabletop items for improved functional performance with salient tasks 4 weeks--- MET  · Pt will increase LUE rate of manipulation for all FM tests for improved functional performance/independence with salient tasks, buttoning, and ADL closures x 75% 4 weeks--- MET  · Pt will increase LUE to Mod I with <10% cuing for tabletop tasks for improved functional performance of life roles and salient tasks 4 weeks---  MET  · Pt will demo with decreased LUE ataxia with functional reach for normalized movement pattern of  L UE and for improved hand to target accuracy x 75% 4 weeks---MET  · Pt will increase L UE strength to 5/5 and  strength R=L, through the use of strengthening exercises and home program for eventual return to engagement in ADLS/IADLs at independent status 4 weeks---  MET    Long Term Goals:  - Pt will increase proprioception of LUE hand to target for improved functional reach vision occluded with ADL tasks  x 95% accuracy--   MET  - Pt will demo with G carryover of Home Exercise Program to improve functional progression towards goals in Plan of care, for improved distal strength, increased independence with ADLs/IADLs, and for improved functional use of LUE  x 95%--MET  - Pt will demo with G tolerance to supine, seated, and in stance exercise x 60 minutes with minimal rest breaks required for increased engagement in weekly exercise regimen and increased engagement in life roles-- MET  · Pt will increase LUE prehension patterns for improved tripod with utensil management with 0% droppage--  MET  · Pt will tolerate IASTM for improved motor and sensory performance for overall improved hand to target with 95% accuracy-- Unable to complete 2* comorbitities  · Pt will increase automaticity of LUE to 95% for improved grasp release of tabletop items for improved functional performance with salient tasks-- PARTIALLY MET   · Pt will increase LUE rate of manipulation for all FM tests for improved functional performance/independence with salient tasks, buttoning, and ADL closures x 95%-- PARTIALLY MET  · Pt will increase LUE to independent status with 0% cuing for tabletop tasks for improved functional performance of life roles and salient tasks-- PARTIALLY MET  · Pt will demo with decreased LUE ataxia with functional reach for normalized movement pattern of  L UE and for improved hand to target accuracy x 95%-- PARTIALLY MET      Subjective Evaluation    Quality of life: fair          SUBJECTIVE: "I am doing pretty good  I am doing my exercises at home"  PATIENT GOAL: "To be able to button again "    HISTORY OF PRESENT ILLNESS:     Pt is a 66 y o  male who was referred to Occupational Therapy s/p  Acute lacunar stroke ([facility] [address] ) [I63 81]  As per hospital reports, Pt presented to the [facility] 03/19/2020 with left sided weakness  Pt was ound to have an acute right thalamic lacunar infarct  TPA not given as patient outside of window   EF of 70% with no regional wall motion abnormalities; was noted to have grade 1 diastolic dysfunction  Started on DAPT, to be completed on 4/10/20  CTA did reveal severe focal stenosis of the proximal left M1 segment as well as severe stenosis of the distal right v4 segment   Patient with elevated HTN during admission, into 200s  Started on anti-hypertensive regimen by SLIM  Accepted to [facility] on 3/25/20  Patient participated in a comprehensive interdisciplinary inpatient rehabilitation program which included involvment of MD, therapies (PT, OT, and/or SLP), RN, CM, SW, dietary, and psychology services  He was able to be advanced to an overall supervision level of assist and considered safe for discharge home with family on 2020  Pt with PMH of previous stroke with full recovery reported  Pt with self-report of noticing difficulties with fine motor tasks such as buttoning/ADL closures, distal weakness, and paresthesias in digits  Pt arrived to evaluation with RW which he utilizes at all times  Pt with self-report of imbalance, L foot drag, and fear of falling  Pt lives in a one story home with sister at this time, though wishes to return to his two story home  Pt currently requiring CGA for safe transfers in and out of shower-- Pt currently has shower chair accessible for bathing routines  Pt currently requires Min A for dressing routine performed in seated position-- Pt's sister reports only challenges for dressing routine are ADL closures  Pt currently dependent on sister for IADL completion  Pt is a retired [employer]-- [address] retired [address]  Pt loss role of  at this time-- Pt's sister is main transportation at this time  PMH:   Past Medical History:   Diagnosis Date    Hypertension     Stroke ([facility] [address] )              Pain Levels:     Restin    With Activity:  0    Objective     Functional Assessment        Comments  See impairment section for further details  Impairment Observations:   1  L distal weakness  2  L FMC/prehension  3  Paresthesias in L digits  4  L impaired proprioception   5  L ataxia affecting hand/target precision      Dynamometer Position #2:   R: 91    95  90  94  L: 76    71  70  73    Pt is R hand dominant  Action:  3 point pinch: R 15 and L 12    R 18, L 14 5  Shahana Blend Shahana Blend R 18, L 14    R 18 5, L 15  2 point pinch: R 10 and L 4  R 12, L 9  R 11 5, L 9  R 11 5, L 10  Lateral pinch: R 15 and L 13    R 14, L 13    R 14 5, L 13 5  Shahana Blend Shahana Blend R 15 5, L 14    9-hole Peg Test: RUE: 33 16 seconds, LUE: 1 minute and 11 seconds with x 1 droppage    R 32 84 seconds, L 1 minute and 3 seconds    R 25 55 seconds, L 49 00 seconds    R 27 65 seconds, L 46 28 seconds    Functional Dexterity Test: R 42 00 seconds, L 1 minutes and 39 seconds with x 1 droppage    R 41 48 seconds, L 1 minute and 27 seconds    R 34 13 seconds, L 1 minute and 2 seconds    R 35 24 seconds, L 52 68 seconds    Myofilaments: B/L 3 61; Pt demo with G abilities distinguishing between hot and cold temperatures  B/L AROM:  Shoulder elevation: B/L full  Shoulder FF: B/L full  Shoulder ABD: B/L full  ER/IR: B/L full  Elbow ext/flex: B/L full  Sup/Pron: B/L full  Wrist flex/ext: B/L full  Composite: B/L full  Hook: B/L full  Opposition: B/L intact  Finger to nose: R intact, L proprioception impaired with mild ataxia evident    Continues to present with impaired proprioception with mild ataxia present  Shahana Blend Shahana Blend Resolved- Significant improved proprioception with and without vision occluded with minimal ataxia present  Dysdiadochokinesia: impaired with L lag    Improvement, though continues to present with delayed lag when increasing pace of functional movement    Resolved-- Significant improved B/L coordination and increased pace of functional movement    MMT: R 5/5 intact, L 5/5 intact       TX COMMENTS:   UBE x 5 minutes prograde and x 5 minutes retrograde in seated position bilaterally with 2 3 resistance level focusing on improved proximal strength/stability, muscle endurance, and B/L coordination  Pt demo with G tolerance of UBE with increased resistance to 2 3 for a total of 10 minutes with Mod fatigue reported       INTERVENTION COMMENTS:  Diagnosis: Acute lacunar stroke ([facility] [address] ) [I63 81]  Precautions: HTN, [facility], [address], with 47% limitation in UE function and 35% limitation in Hand function    74, with 35% limitation in UE function and 0% limitation in Hand function    93, with 17% limitation in UE function and 5% limitation in Hand function  Insurance: Payor: [employer] REP / Plan: Carolyn BURGESS [address] REP / Product Type: Medicare HMO /   4 of 4 visits  through 07/16/2020

## 2020-07-14 ENCOUNTER — OFFICE VISIT (OUTPATIENT)
Dept: PHYSICAL THERAPY | Facility: REHABILITATION | Age: 75
End: 2020-07-14
Payer: COMMERCIAL

## 2020-07-14 DIAGNOSIS — I63.81 LACUNAR INFARCTION (HCC): Primary | ICD-10-CM

## 2020-07-14 PROCEDURE — 97110 THERAPEUTIC EXERCISES: CPT | Performed by: PHYSICAL THERAPIST

## 2020-07-14 PROCEDURE — 97112 NEUROMUSCULAR REEDUCATION: CPT | Performed by: PHYSICAL THERAPIST

## 2020-07-14 NOTE — PROGRESS NOTES
Daily Note     Today's date: 2020  Patient name: Daniela Holland  : 1945  MRN: [id number]  Referring provider: Krystal Liu MD  Dx:   Encounter Diagnosis     ICD-10-CM    1  Lacunar infarction ([address] ) I63 81                   Subjective: Pt reports feeling well and has been eating normally again       Objective: See treatment diary below    Precautions: CVA, monitor BP, fall risk       2020     /103, heart rate 57 bpm /89, heart rate 51 bpm /97, 64 bpm heart rate /87, heart rate 54 bpm   187/107, 65 bpm /100   Neuromuscular re-education Treadmill walking 1 5 mph warm-up 1 5 min  Intervals 30 sec fast 2 6 mph 60 sec slow 1 5 mph for 12 min  104 bpm average, 113 bpm max, 13/20 RPE    Treadmill walking 1 5 mph warm-up 1 5 min  Intervals 30 sec fast 2 7 mph 60 sec slow 1 5 mph for 12 min  108 bpm average, 119 bpm max, 13/20 RPE    Perturbations from flat ground and 4" step, most posterior but also laterally and posterior/laterally, 6 min    Standing pushing cable column to the side with trunk rotation, hip level, 25 lbs x 8 right and left Overground walking with feedback about speed, no AD, 100 feet:  28-31 second laps, x 8, 20 sec rest in between laps    Walking weaving between cones, then tapping heel to cone without crushing it, no losses of balance requiring assist to correct, 6 min    Treadmill walking 1 5 mph warm-up 1 5 min  Intervals 30 sec fast 2 6 mph 60 sec slow 1 6 mph for 12 min    Treadmill walking 1 5 mph warm-up 1 5 min  Intervals 30 sec fast 2 6 mph 60 sec slow 1 6 mph for 12 min    Perturbations from flat ground and 4" step, most posterior but also laterally and posterior/laterally, 3 5 min Overground walking with feedback about speed, no AD, 100 feet, 30-36 seconds, 20 sec rest in between, 10 laps    Treadmill walking 1 5 mph warm-up 1 5 min  Intervals 30 sec fast 2 6 mph 60 sec slow 1 6 mph for 12 min, 102 bpm average    Treadmill walking 1 5 mph warm-up 1 4 min  Intervals 30 sec fast 2 6 mph 60 sec slow 1 6 mph for 12 min    Overground walking weaving between cones, then fast walking around clinic with arm swing, then tapping cones with feet, 3 min x 2   Overground walking with feedback about speed, no AD, CGA, 100 feet:  29 5-32 sec laps, 20 sec rest in between laps, verbal cues for speed and step lengths    Treadmill walking 1 5 mph warm-up, 30 sec fast intervals to 2 5 mph, 60 sec slower intervals 1 5 mph, use of 2 railings, 12 min     Treadmill walking 1 5 mph warm-up, 30 sec fast intervals to 2 6 -2 7 mph, 60 sec slower intervals 1 5 mph, use of 2 railings, 12 min    54/46 weight bearing right and left (used Biodex treadmill with both walking intervals)    Overground walking weaving in between cones, then tapping cones with feet, then stepping onto airex foam, then catching ball tossed by student OT, 10 min total     Overground walking with feedback about speed, no AD, CGA, 100 feet:  29 -32 sec laps, 20 sec x10    Treadmill walking 1 5 mph warm-up, 30 sec fast intervals to 2 5 mph, 60 sec slower intervals 1 5 mph, use of 2 railings, 6 min x2 w/ seated rest break    Overground walking weaving in between cones, then tapping cones with feet, then stepping onto airex foam  x10 minutes   Overground walking, no AD, CGA, 28-32s  5 laps x2    Obstacles, weaving between cones, hurdles, stepping on foam  15 min    Perturbations 5 min     Static balance foam FT 30"x2    Hurdles no rail 6 laps with turning    Cones taps no UE support, CGA 30x        LE strengthening  (therapeutic exercise  Standing pushing cable column to the side with trunk rotation, hip level, 25 lbs x 10 right and lef   Scifit x10 minutes Scifit 10 min L1                   Assessment: Tolerated treatment well  BP continues to be abnormally high  Discussed importance of following up with PCP regarding managing BP more effectively   Aerobic training on treadmill was held due to this today  BP maintained same through out session  Incorporated more balance training today which pt tolerated well  Was challenged demonstrated instability and postural sway, able to self correct  Patient demonstrated fatigue post treatment and would benefit from continued PT      Plan: Continue per plan of care  Practice turns and lateral stepping         INTERVENTION COMMENTS:  Diagnosis: Acute lacunar stroke (HCC) [I63 81]  Precautions: HTN, [place], Glaucoma     FOTO: 49, with 47% limitation in UE function and 35% limitation in Hand function  Insurance: Payor: [employer] REP / Plan: [employer] BLUE JOURNEY [facility] REP / Product Type: Medicare HMO /   3 of 4 visits  through 06/25/2020, PN due 07/11/2020

## 2020-07-16 ENCOUNTER — OFFICE VISIT (OUTPATIENT)
Dept: PHYSICAL THERAPY | Facility: REHABILITATION | Age: 75
End: 2020-07-16
Payer: COMMERCIAL

## 2020-07-16 DIAGNOSIS — I63.81 LACUNAR INFARCTION (HCC): Primary | ICD-10-CM

## 2020-07-16 PROCEDURE — 97112 NEUROMUSCULAR REEDUCATION: CPT

## 2020-07-16 PROCEDURE — 97110 THERAPEUTIC EXERCISES: CPT

## 2020-07-16 NOTE — PROGRESS NOTES
Daily Note     Today's date: 2020  Patient name: Sonia Post  : 1945  MRN: [id number]  Referring provider: Mamadou Lamb MD  Dx:   Encounter Diagnosis     ICD-10-CM    1  Lacunar infarction ([address] ) I63 81        Start Time: 1105  Stop Time: 1200  Total time in clinic (min): 55 minutes    Subjective: Pt reports feeling good today       Objective: See treatment diary below    Precautions: CVA, monitor BP, fall risk       2020     /103, heart rate 57 bpm /89, heart rate 51 bpm /97, 64 bpm heart rate /87, heart rate 54 bpm   187/107, 65 bpm /100 /87, 59 bpm   Neuromuscular re-education Treadmill walking 1 5 mph warm-up 1 5 min  Intervals 30 sec fast 2 6 mph 60 sec slow 1 5 mph for 12 min  104 bpm average, 113 bpm max, 13/20 RPE    Treadmill walking 1 5 mph warm-up 1 5 min  Intervals 30 sec fast 2 7 mph 60 sec slow 1 5 mph for 12 min  108 bpm average, 119 bpm max, 13/20 RPE    Perturbations from flat ground and 4" step, most posterior but also laterally and posterior/laterally, 6 min    Standing pushing cable column to the side with trunk rotation, hip level, 25 lbs x 8 right and left Overground walking with feedback about speed, no AD, 100 feet:  28-31 second laps, x 8, 20 sec rest in between laps    Walking weaving between cones, then tapping heel to cone without crushing it, no losses of balance requiring assist to correct, 6 min    Treadmill walking 1 5 mph warm-up 1 5 min  Intervals 30 sec fast 2 6 mph 60 sec slow 1 6 mph for 12 min    Treadmill walking 1 5 mph warm-up 1 5 min  Intervals 30 sec fast 2 6 mph 60 sec slow 1 6 mph for 12 min    Perturbations from flat ground and 4" step, most posterior but also laterally and posterior/laterally, 3 5 min Overground walking with feedback about speed, no AD, 100 feet, 30-36 seconds, 20 sec rest in between, 10 laps    Treadmill walking 1 5 mph warm-up 1 5 min  Intervals 30 sec fast 2 6 mph 60 sec slow 1 6 mph for 12 min, 102 bpm average    Treadmill walking 1 5 mph warm-up 1 4 min  Intervals 30 sec fast 2 6 mph 60 sec slow 1 6 mph for 12 min    Overground walking weaving between cones, then fast walking around clinic with arm swing, then tapping cones with feet, 3 min x 2   Overground walking with feedback about speed, no AD, CGA, 100 feet:  29 5-32 sec laps, 20 sec rest in between laps, verbal cues for speed and step lengths    Treadmill walking 1 5 mph warm-up, 30 sec fast intervals to 2 5 mph, 60 sec slower intervals 1 5 mph, use of 2 railings, 12 min     Treadmill walking 1 5 mph warm-up, 30 sec fast intervals to 2 6 -2 7 mph, 60 sec slower intervals 1 5 mph, use of 2 railings, 12 min    54/46 weight bearing right and left (used [employer] treadmill with both walking intervals)    Overground walking weaving in between cones, then tapping cones with feet, then stepping onto airex foam, then catching ball tossed by student OT, 10 min total     Overground walking with feedback about speed, no AD, CGA, 100 feet:  29 -32 sec laps, 20 sec x10    Treadmill walking 1 5 mph warm-up, 30 sec fast intervals to 2 5 mph, 60 sec slower intervals 1 5 mph, use of 2 railings, 6 min x2 w/ seated rest break    Overground walking weaving in between cones, then tapping cones with feet, then stepping onto airex foam  x10 minutes   Overground walking, no AD, CGA, 28-32s  5 laps x2    Obstacles, weaving between cones, hurdles, stepping on foam  15 min    Perturbations 5 min     Static balance foam FT 30"x2    Hurdles no rail 6 laps with turning    Cones taps no UE support, CGA 30x     Overground walking, no AD, CGA, 26-31s   5 laps x2    Obstacles, weaving between cones, hurdles, lateral stepping stepping on foam  10 min    Anterior/posterior toe taps x2 min, Left leg    Foam to foam stepping x4 minutes    Cones taps 1 hand rail, CGA 30x    Foam cones taps 1 hand rail, CGA 30x      LE strengthening  (therapeutic exercise  Standing pushing cable column to the side with trunk rotation, hip level, 25 lbs x 10 right and lef   Scifit x10 minutes Scifit 10 min L1 Scifit 10 min L1                    Assessment: Tolerated treatment well  BP value much lower as compared to previous visit  Some scuffing is evident w/ activities but as patient fatigues this increases and he will experience instances of LOB  He is able to correct some of these w/ stepping strategy but is also reliant on external support  Progressed foam balance exercises to continue to promote improved balanced and improved consistency of clearing LEs  Patient demonstrated fatigue post treatment and would benefit from continued PT      Plan: Continue per plan of care  Practice turns and lateral stepping  Continue to monitor foot clearance, especially as pt fatigues        INTERVENTION COMMENTS:  Diagnosis: Acute lacunar stroke ([facility] [address] ) [I63 81]  Precautions: HTN, [place], Glaucoma     FOTO: 49, with 47% limitation in UE function and 35% limitation in Hand function  Insurance: Payor: [employer]LOY[employer]TH  REP / Plan: [employer] [facility] [address] REP / Product Type: Medicare HMO /   3 of 4 visits  through 06/25/2020, PN due 07/11/2020

## 2020-07-21 ENCOUNTER — EVALUATION (OUTPATIENT)
Dept: PHYSICAL THERAPY | Facility: REHABILITATION | Age: 75
End: 2020-07-21
Payer: COMMERCIAL

## 2020-07-21 DIAGNOSIS — I63.81 LACUNAR INFARCTION (HCC): Primary | ICD-10-CM

## 2020-07-21 PROCEDURE — 97112 NEUROMUSCULAR REEDUCATION: CPT | Performed by: PHYSICAL THERAPIST

## 2020-07-21 NOTE — PROGRESS NOTES
Treatment Note / RE-EVALUATION    Today's date: 2020  Patient name: Stephan Bell  : 1945  MRN: [id number]  Referring provider: Latanya Mojica MD  Dx:   Encounter Diagnosis     ICD-10-CM    1  Lacunar infarction ([address] ) I63 81      Subjective: Alfredo reports walking on his driveway for exercise, sometimes with a cane, sometimes without  Objective: See treatment diary below    Precautions: CVA, monitor BP, fall risk       2020     /87, heart rate 54 bpm   187/107, 65 bpm /100 /87, 59 bpm    Neuromuscular re-education Overground walking with feedback about speed, no AD, CGA, 100 feet:  29 5-32 sec laps, 20 sec rest in between laps, verbal cues for speed and step lengths    Treadmill walking 1 5 mph warm-up, 30 sec fast intervals to 2 5 mph, 60 sec slower intervals 1 5 mph, use of 2 railings, 12 min     Treadmill walking 1 5 mph warm-up, 30 sec fast intervals to 2 6 -2 7 mph, 60 sec slower intervals 1 5 mph, use of 2 railings, 12 min    54/46 weight bearing right and left (used [employer] treadmill with both walking intervals)    Overground walking weaving in between cones, then tapping cones with feet, then stepping onto airex foam, then catching ball tossed by student OT, 10 min total     Overground walking with feedback about speed, no AD, CGA, 100 feet:  29 -32 sec laps, 20 sec x10    Treadmill walking 1 5 mph warm-up, 30 sec fast intervals to 2 5 mph, 60 sec slower intervals 1 5 mph, use of 2 railings, 6 min x2 w/ seated rest break    Overground walking weaving in between cones, then tapping cones with feet, then stepping onto airex foam  x10 minutes   Overground walking, no AD, CGA, 28-32s  5 laps x2    Obstacles, weaving between cones, hurdles, stepping on foam  15 min    Perturbations 5 min     Static balance foam FT 30"x2    Hurdles no rail 6 laps with turning    Cones taps no UE support, CGA 30x     Overground walking, no AD, CGA, 26-31s   5 laps x2    Obstacles, weaving between cones, hurdles, lateral stepping stepping on foam  10 min    Anterior/posterior toe taps x2 min, Left leg    Foam to foam stepping x4 minutes    Cones taps 1 hand rail, CGA 30x    Foam cones taps 1 hand rail, CGA 30x   Treadmill walking, 1 5 mph slow, 2 7 mph fast, 60 sec slow 30 sec fast, 10 min total    Treadmill walking, 1 5 mph slow, 2 7 mph fast, 60 sec slow 30 sec fast, 10 min total    LE strengthening  (therapeutic exercise  Scifit x10 minutes Scifit 10 min L1 Scifit 10 min L1                   Mobility Measures 4/16/2020 5/14/2020 6/23/2020 7/21/2020   Assistive device used? Rolling Walker Rolling walker for home ambulation, exercises today completed without assistive device Rolling walker none   5 Time Sit to Stand  (17" chair, arms across chest) Unable from 17" chair    With strong use of arms, 21 3 seconds 35 seconds with hands on knees for reps 3-5 Unable from 17" chair    From 19" chair, 18 3 seconds without pushing from hands 19 7 seconds from 17" chair   3 Meter Timed  Up & Go 22 2 seconds  21 5 seconds    18 4 seconds as fast as safely possible   18 3 seconds no assistive device 15 9 seconds no assistive device 11 3 seconds   Walking speed 0 61 meters/second 0 71 meters/second 0 73 meters/second self-selected    Functional Gait Assessment (see below) Deferred for safety 13/30 20/30 21/30   6 Minute Walk Test (100 foot circular course) 715 feet  Ending heart rate 66 beats/minute    /93, heart rate 66 bpm 810 feet completed with CGA without assistive device 905 feet with close supervision 990 feet without assistive device   Patient-Reported Outcome Measure: Activities-Specific Balance Confidence Scale (ABC Scale) Not completed    Not completed Not completed   Functional Gait Assessment  2/3 Gait level surface  3/3 Change in gait speed  3/3 Gait with horizontal head turns  3/3 Gait with vertical head turns  3/3 Gait and pivot turn  2/3 Step over obstacle  0/3 Gait with narrow base of support  2/3 Gait with eyes closed  1/3 Ambulating backwards  2/3 Steps  21/30 Total score (less than 22/30 indicates increased risk of fall)       Normal Romberg  ASSESSMENT:  Alfredo demonstrated gradual further improvement in dynamic balance and walking efficiency  He continues to progress towards more functional dynamic balance  Recommend continued physical therapy to improve dynamic balance, as he may return home within the next couple weeks  SHORT-TERM GOALS: 1 month(s)  1  Patient stands from 17 inch surface without use of hands  MET  2  Patient completes 3 meter timed up and go in 15 seconds, self-selected speed  MET  3  Patient walks at least 850 feet on 6 minute walk test   MET  4  Alfredo scores at least 23/30 on FGA in 1 month  5  Alfredo completes 5 times sit to  12 seconds in 1 month  LONG-TERM GOALS: 3 month(s)  1  Patient returns to prior level of community walking, with assistive device as needed  PROGRESSING  2  Patient walks at least 1200 feet consecutively  NOT MET  Precautions - monitor BP; fall risk    PLAN OF CARE:  Patient will benefit from physical therapy 2 times per week for 1 month  Neuromuscular re-education, therapeutic exercises, and therapeutic activities as outlined in grids

## 2020-07-23 ENCOUNTER — OFFICE VISIT (OUTPATIENT)
Dept: PHYSICAL THERAPY | Facility: REHABILITATION | Age: 75
End: 2020-07-23
Payer: COMMERCIAL

## 2020-07-23 DIAGNOSIS — I63.81 LACUNAR INFARCTION (HCC): Primary | ICD-10-CM

## 2020-07-23 PROCEDURE — 97112 NEUROMUSCULAR REEDUCATION: CPT | Performed by: PHYSICAL THERAPIST

## 2020-07-28 ENCOUNTER — OFFICE VISIT (OUTPATIENT)
Dept: PHYSICAL THERAPY | Facility: REHABILITATION | Age: 75
End: 2020-07-28
Payer: COMMERCIAL

## 2020-07-28 DIAGNOSIS — I63.81 LACUNAR INFARCTION (HCC): Primary | ICD-10-CM

## 2020-07-28 PROCEDURE — 97112 NEUROMUSCULAR REEDUCATION: CPT | Performed by: PHYSICAL THERAPIST

## 2020-07-28 NOTE — PROGRESS NOTES
Treatment Note     Today's date: 2020  Patient name: Kimber Escobar  : 1945  MRN: [id number]  Referring provider: Nohemy Guillen MD  Dx:   Encounter Diagnosis     ICD-10-CM    1  Lacunar infarction ([address] ) I63 81      Subjective: Alfredo reports walking on his driveway for exercise, sometimes with a cane, sometimes without  Objective: See treatment diary below    Precautions: CVA, monitor BP, fall risk       2020     /100 /87, 59 bpm  /98, heart rate 64 bpm /102, heart rate 55 bpm   Neuromuscular re-education Overground walking, no AD, CGA, 28-32s  5 laps x2    Obstacles, weaving between cones, hurdles, stepping on foam  15 min    Perturbations 5 min     Static balance foam FT 30"x2    Hurdles no rail 6 laps with turning    Cones taps no UE support, CGA 30x     Overground walking, no AD, CGA, 26-31s   5 laps x2    Obstacles, weaving between cones, hurdles, lateral stepping stepping on foam  10 min    Anterior/posterior toe taps x2 min, Left leg    Foam to foam stepping x4 minutes    Cones taps 1 hand rail, CGA 30x    Foam cones taps 1 hand rail, CGA 30x   Treadmill walking, 1 5 mph slow, 2 7 mph fast, 60 sec slow 30 sec fast, 10 min total    Treadmill walking, 1 5 mph slow, 2 7 mph fast, 60 sec slow 30 sec fast, 10 min total Overground walking, turning and passing 2 lb ball with 2 hands to therapist behind patient, 8 min    Treadmill 1 5 mph slow 1 min 2 8 mph fast 30 sec, 11 min total with 1 5 min warm-up and cooldown    Treadmill 1 5 mph slow 1 min 2 8 mph fast 30 sec, 11 min total with 1 5 min warm-up and cooldown    Static balance eyes on airex foam:   VOR x 1 horizontal 30 sec x 3  Eyes closed feet apart, 30 sec x 3    Step ups 6" step, no railings, 2 min    High step walking touching contralateral knee, 2 min  Sidestepping in parallel bars, 2 min, then with posterior perturbations, 2 min     Overground walking, 100 feet laps:   28 sec  28 sec  27 sec  28 sec  27 sec  27 sec  27 sec  27 sec  26 sec  27 sec  /97, heart rate 56 bpm    Treadmill, 1 5 mph slow, 1 5 min warm-up, 30 sec fast at 2 8 mph, 1 min slow  -----------  12 min    Treadmill, backwards, 0 5 mph, 2 min, then 1 min, then 1 5 mph forwards; alternating back and forth between forwards and backwards, 11 min    Step ups 6" step, no railings, 5 min    Overground walking, quick stopping, backwards walking, 3 min            LE strengthening  (therapeutic exercise Scifit 10 min L1 Scifit 10 min L1                     Mobility Measures 4/16/2020 5/14/2020 6/23/2020 7/21/2020   Assistive device used? Rolling Walker Rolling walker for home ambulation, exercises today completed without assistive device Rolling walker none   5 Time Sit to Stand  (17" chair, arms across chest) Unable from 17" chair    With strong use of arms, 21 3 seconds 35 seconds with hands on knees for reps 3-5 Unable from 17" chair    From 19" chair, 18 3 seconds without pushing from hands 19 7 seconds from 17" chair   3 Meter Timed  Up & Go 22 2 seconds  21 5 seconds    18 4 seconds as fast as safely possible   18 3 seconds no assistive device 15 9 seconds no assistive device 11 3 seconds   Walking speed 0 61 meters/second 0 71 meters/second 0 73 meters/second self-selected    Functional Gait Assessment (see below) Deferred for safety 13/30 20/30 21/30   6 Minute Walk Test (100 foot circular course) 715 feet  Ending heart rate 66 beats/minute    /93, heart rate 66 bpm 810 feet completed with CGA without assistive device 905 feet with close supervision 990 feet without assistive device   Patient-Reported Outcome Measure: Activities-Specific Balance Confidence Scale (ABC Scale) Not completed    Not completed Not completed   Functional Gait Assessment  2/3 Gait level surface  3/3 Change in gait speed  3/3 Gait with horizontal head turns  3/3 Gait with vertical head turns  3/3 Gait and pivot turn  2/3 Step over obstacle  0/3 Gait with narrow base of support  2/3 Gait with eyes closed  1/3 Ambulating backwards  2/3 Steps  21/30 Total score (less than 22/30 indicates increased risk of fall)       Normal Romberg  ASSESSMENT:  Alfredo did well with initial overground walking, walking faster than previously  With a lot of exercise and fatigue, he doesn't clear the left leg during swing phase as well, but this only appears very late in treatment at this point  SHORT-TERM GOALS: 1 month(s)  1  Patient stands from 17 inch surface without use of hands  MET  2  Patient completes 3 meter timed up and go in 15 seconds, self-selected speed  MET  3  Patient walks at least 850 feet on 6 minute walk test   MET  4  Alfredo scores at least 23/30 on FGA in 1 month  5  Alfredo completes 5 times sit to  12 seconds in 1 month  LONG-TERM GOALS: 3 month(s)  1  Patient returns to prior level of community walking, with assistive device as needed  PROGRESSING  2  Patient walks at least 1200 feet consecutively  NOT MET  Precautions - monitor BP; fall risk    PLAN OF CARE:  Patient will benefit from physical therapy 2 times per week for 1 month  Neuromuscular re-education, therapeutic exercises, and therapeutic activities as outlined in grids

## 2020-07-29 ENCOUNTER — OFFICE VISIT (OUTPATIENT)
Dept: PHYSICAL THERAPY | Facility: REHABILITATION | Age: 75
End: 2020-07-29
Payer: COMMERCIAL

## 2020-07-29 DIAGNOSIS — I63.81 LACUNAR INFARCTION (HCC): Primary | ICD-10-CM

## 2020-07-29 PROCEDURE — 97110 THERAPEUTIC EXERCISES: CPT | Performed by: PHYSICAL THERAPIST

## 2020-07-29 PROCEDURE — 97112 NEUROMUSCULAR REEDUCATION: CPT | Performed by: PHYSICAL THERAPIST

## 2020-07-29 NOTE — PROGRESS NOTES
Treatment Note     Today's date: 2020  Patient name: Joe Meza  : 1945  MRN: [id number]  Referring provider: Nav Crawley MD  Dx:   Encounter Diagnosis     ICD-10-CM    1  Lacunar infarction [facility]) I63 81      Subjective: Alfredo will see physician in next couple days and talk about blood pressure  He will return home tomorrow  Objective: See treatment diary below    Precautions: CVA, monitor BP, fall risk       2020     /100 /87, 59 bpm  /98, heart rate 64 bpm /102, heart rate 55 bpm /90   Neuromuscular re-education Overground walking, no AD, CGA, 28-32s  5 laps x2    Obstacles, weaving between cones, hurdles, stepping on foam  15 min    Perturbations 5 min     Static balance foam FT 30"x2    Hurdles no rail 6 laps with turning    Cones taps no UE support, CGA 30x     Overground walking, no AD, CGA, 26-31s   5 laps x2    Obstacles, weaving between cones, hurdles, lateral stepping stepping on foam  10 min    Anterior/posterior toe taps x2 min, Left leg    Foam to foam stepping x4 minutes    Cones taps 1 hand rail, CGA 30x    Foam cones taps 1 hand rail, CGA 30x   Treadmill walking, 1 5 mph slow, 2 7 mph fast, 60 sec slow 30 sec fast, 10 min total    Treadmill walking, 1 5 mph slow, 2 7 mph fast, 60 sec slow 30 sec fast, 10 min total Overground walking, turning and passing 2 lb ball with 2 hands to therapist behind patient, 8 min    Treadmill 1 5 mph slow 1 min 2 8 mph fast 30 sec, 11 min total with 1 5 min warm-up and cooldown    Treadmill 1 5 mph slow 1 min 2 8 mph fast 30 sec, 11 min total with 1 5 min warm-up and cooldown    Static balance eyes on airex foam:   VOR x 1 horizontal 30 sec x 3  Eyes closed feet apart, 30 sec x 3    Step ups 6" step, no railings, 2 min    High step walking touching contralateral knee, 2 min  Sidestepping in parallel bars, 2 min, then with posterior perturbations, 2 min     Overground walking, 100 feet laps:   28 sec  28 sec  27 sec  28 sec  27 sec  27 sec  27 sec  27 sec  26 sec  27 sec  /97, heart rate 56 bpm    Treadmill, 1 5 mph slow, 1 5 min warm-up, 30 sec fast at 2 8 mph, 1 min slow  -----------  12 min    Treadmill, backwards, 0 5 mph, 2 min, then 1 min, then 1 5 mph forwards; alternating back and forth between forwards and backwards, 11 min    Step ups 6" step, no railings, 5 min    Overground walking, quick stopping, backwards walking, 3 min         Overground walking 100 feet laps,   26-28 seconds, x 10 sets     Overground walking with catching and tossing ball to student physical therapist, contact guard, 4 min x 2    Treadmill 1 6 mph 1 5 min warm-up, 30 sec fast 2 8 mph 60 sec slow 1 6 mph, 12 min total    Treadmill alternating between backwards and forwards, backwards 0 5 - 0 7 mph 1 min, forwards 1 6 mph 1 min  6 min      LE strengthening  (therapeutic exercise Scifit 10 min L1 Scifit 10 min L1    SciFit 12 min L 1 5, about 750 steps                   Mobility Measures 4/16/2020 5/14/2020 6/23/2020 7/21/2020   Assistive device used?  Rolling Walker Rolling walker for home ambulation, exercises today completed without assistive device Rolling walker none   5 Time Sit to Stand  (17" chair, arms across chest) Unable from 17" chair    With strong use of arms, 21 3 seconds 35 seconds with hands on knees for reps 3-5 Unable from 17" chair    From 19" chair, 18 3 seconds without pushing from hands 19 7 seconds from 17" chair   3 Meter Timed  Up & Go 22 2 seconds  21 5 seconds    18 4 seconds as fast as safely possible   18 3 seconds no assistive device 15 9 seconds no assistive device 11 3 seconds   Walking speed 0 61 meters/second 0 71 meters/second 0 73 meters/second self-selected    Functional Gait Assessment (see below) Deferred for safety 13/30 20/30 21/30   6 Minute Walk Test (100 foot circular course) 715 feet  Ending heart rate 66 beats/minute    /93, heart rate 66 bpm 810 feet completed with CGA without assistive device 905 feet with close supervision 990 feet without assistive device   Patient-Reported Outcome Measure: Activities-Specific Balance Confidence Scale (ABC Scale) Not completed  Not completed Not completed   Functional Gait Assessment  2/3 Gait level surface  3/3 Change in gait speed  3/3 Gait with horizontal head turns  3/3 Gait with vertical head turns  3/3 Gait and pivot turn  2/3 Step over obstacle  0/3 Gait with narrow base of support  2/3 Gait with eyes closed  1/3 Ambulating backwards  2/3 Steps  21/30 Total score (less than 22/30 indicates increased risk of fall)       Normal Romberg  ASSESSMENT:  Better overground walking today  Some increased errors with concurrent catching and tossing task, but no assistance needed to retain balance  Continue to challenge  SHORT-TERM GOALS: 1 month(s)  1  Patient stands from 17 inch surface without use of hands  MET  2  Patient completes 3 meter timed up and go in 15 seconds, self-selected speed  MET  3  Patient walks at least 850 feet on 6 minute walk test   MET  4  Alfredo scores at least 23/30 on FGA in 1 month  5  Alfredo completes 5 times sit to  12 seconds in 1 month  LONG-TERM GOALS: 3 month(s)  1  Patient returns to prior level of community walking, with assistive device as needed  PROGRESSING  2  Patient walks at least 1200 feet consecutively  NOT MET  Precautions - monitor BP; fall risk    PLAN OF CARE:  Patient will benefit from physical therapy 2 times per week for 1 month  Neuromuscular re-education, therapeutic exercises, and therapeutic activities as outlined in grids

## 2020-08-04 ENCOUNTER — APPOINTMENT (OUTPATIENT)
Dept: PHYSICAL THERAPY | Facility: REHABILITATION | Age: 75
End: 2020-08-04
Payer: COMMERCIAL

## 2020-08-05 ENCOUNTER — OFFICE VISIT (OUTPATIENT)
Dept: PHYSICAL THERAPY | Facility: REHABILITATION | Age: 75
End: 2020-08-05
Payer: COMMERCIAL

## 2020-08-05 DIAGNOSIS — I63.81 LACUNAR INFARCTION (HCC): Primary | ICD-10-CM

## 2020-08-05 PROCEDURE — 97112 NEUROMUSCULAR REEDUCATION: CPT

## 2020-08-05 PROCEDURE — 97110 THERAPEUTIC EXERCISES: CPT

## 2020-08-05 NOTE — PROGRESS NOTES
Daily Note     Today's date: 2020  Patient name: Nayla Saleh  : 1945  MRN: [id number]  Referring provider: Mary Nur MD  Dx:   Encounter Diagnosis     ICD-10-CM    1  Lacunar infarction ([facility] [address] )  I63 81        Start Time: 1030  Stop Time: 1130  Total time in clinic (min): 60 minutes    Subjective: Pt reports he is doing well today, he is walking w/ his cane today instead of his walker, he feels pretty good with it  Objective: See treatment diary below      Assessment: Tolerated treatment well  Educated pt on correct sizing of cane as his personal one is too short for his height  Scuffing of left foot noted throughout ambulation, w/ increased instances when he fatigues  This becomes a safety concern w/ fatigue, as when he turns to sit he will attempt to reach for chair prior to being sufficiently close to complete transfer correctly  Reviewed this w/ patient, focusing on awareness of left leg, which pt was able to improve w/ repeated practice  Patient demonstrated fatigue post treatment and would benefit from continued PT      Plan: Continue per plan of care  Monitor left foot step height and continue to practice turning to sit transfers          Precautions: CVA, monitor BP, fall risk       2020 8     /100 /87, 59 bpm  /98, heart rate 64 bpm /102, heart rate 55 bpm /90 145/100 59 bpm   Neuromuscular re-education Overground walking, no AD, CGA, 28-32s  5 laps x2    Obstacles, weaving between cones, hurdles, stepping on foam  15 min    Perturbations 5 min     Static balance foam FT 30"x2    Hurdles no rail 6 laps with turning    Cones taps no UE support, CGA 30x     Overground walking, no AD, CGA, 26-31s   5 laps x2    Obstacles, weaving between cones, hurdles, lateral stepping stepping on foam  10 min    Anterior/posterior toe taps x2 min, Left leg    Foam to foam stepping x4 minutes    Cones taps 1 hand rail, CGA 30x    Foam cones taps 1 hand rail, CGA 30x   Treadmill walking, 1 5 mph slow, 2 7 mph fast, 60 sec slow 30 sec fast, 10 min total    Treadmill walking, 1 5 mph slow, 2 7 mph fast, 60 sec slow 30 sec fast, 10 min total Overground walking, turning and passing 2 lb ball with 2 hands to therapist behind patient, 8 min    Treadmill 1 5 mph slow 1 min 2 8 mph fast 30 sec, 11 min total with 1 5 min warm-up and cooldown    Treadmill 1 5 mph slow 1 min 2 8 mph fast 30 sec, 11 min total with 1 5 min warm-up and cooldown    Static balance eyes on airex foam:   VOR x 1 horizontal 30 sec x 3  Eyes closed feet apart, 30 sec x 3    Step ups 6" step, no railings, 2 min    High step walking touching contralateral knee, 2 min  Sidestepping in parallel bars, 2 min, then with posterior perturbations, 2 min     Overground walking, 100 feet laps:   28 sec  28 sec  27 sec  28 sec  27 sec  27 sec  27 sec  27 sec  26 sec  27 sec  /97, heart rate 56 bpm    Treadmill, 1 5 mph slow, 1 5 min warm-up, 30 sec fast at 2 8 mph, 1 min slow  -----------  12 min    Treadmill, backwards, 0 5 mph, 2 min, then 1 min, then 1 5 mph forwards; alternating back and forth between forwards and backwards, 11 min    Step ups 6" step, no railings, 5 min    Overground walking, quick stopping, backwards walking, 3 min         Overground walking 100 feet laps,   26-28 seconds, x 10 sets     Overground walking with catching and tossing ball to student physical therapist, contact guard, 4 min x 2    Treadmill 1 6 mph 1 5 min warm-up, 30 sec fast 2 8 mph 60 sec slow 1 6 mph, 12 min total    Treadmill alternating between backwards and forwards, backwards 0 5 - 0 7 mph 1 min, forwards 1 6 mph 1 min  6 min   Overground walking 100 feet laps,   27-30 seconds, x 10 sets     Overground walking with lateral ball toss, CG x4 min x2    Walking between chairs turn to sit x10    Treadmill 1 6 mph 1 5 min warm-up, 30 sec fast 2 8 mph 60 sec slow 1 6 mph, 12 min total    LE strengthening  (therapeutic exercise Scifit 10 min L1 Scifit 10 min L1    SciFit 12 min L 1 5, about 750 steps                 INTERVENTION COMMENTS:  Diagnosis: Acute lacunar stroke (HCC) [I63 81]  Precautions: HTN, [place], Glaucoma     FOTO: 49, with 47% limitation in UE function and 35% limitation in Hand function  Insurance: Payor: [employer] / Plan: RAFA BARAJAS [address] REP / Product Type: Medicare HMO /   3 of 4 visits  through 06/25/2020, PN due 07/11/2020

## 2020-08-06 ENCOUNTER — OFFICE VISIT (OUTPATIENT)
Dept: PHYSICAL THERAPY | Facility: REHABILITATION | Age: 75
End: 2020-08-06
Payer: COMMERCIAL

## 2020-08-06 DIAGNOSIS — I63.81 LACUNAR INFARCTION (HCC): Primary | ICD-10-CM

## 2020-08-06 PROCEDURE — 97112 NEUROMUSCULAR REEDUCATION: CPT | Performed by: PHYSICAL THERAPIST

## 2020-08-06 NOTE — PROGRESS NOTES
Daily Note     Today's date: 2020  Patient name: Sonia Post  : 1945  MRN: [id number]  Referring provider: Mamadou Lamb MD  Dx:   Encounter Diagnosis     ICD-10-CM    1  Lacunar infarction [facility])  I63 81        Subjective: Alfredo is back at home, went to [facility] yesterday to grocery shop and did okay       Objective: See treatment diary below    Precautions: CVA, monitor BP, fall risk       2020     /98, heart rate 64 bpm /102, heart rate 55 bpm /90 145/100 59 bpm After 2 min walking bY 193/98, heart rate 63 bpm   Neuromuscular re-education Overground walking, turning and passing 2 lb ball with 2 hands to therapist behind patient, 8 min    Treadmill 1 5 mph slow 1 min 2 8 mph fast 30 sec, 11 min total with 1 5 min warm-up and cooldown    Treadmill 1 5 mph slow 1 min 2 8 mph fast 30 sec, 11 min total with 1 5 min warm-up and cooldown    Static balance eyes on airex foam:   VOR x 1 horizontal 30 sec x 3  Eyes closed feet apart, 30 sec x 3    Step ups 6" step, no railings, 2 min    High step walking touching contralateral knee, 2 min  Sidestepping in parallel bars, 2 min, then with posterior perturbations, 2 min     Overground walking, 100 feet laps:   28 sec  28 sec  27 sec  28 sec  27 sec  27 sec  27 sec  27 sec  26 sec  27 sec  /97, heart rate 56 bpm    Treadmill, 1 5 mph slow, 1 5 min warm-up, 30 sec fast at 2 8 mph, 1 min slow  -----------  12 min    Treadmill, backwards, 0 5 mph, 2 min, then 1 min, then 1 5 mph forwards; alternating back and forth between forwards and backwards, 11 min    Step ups 6" step, no railings, 5 min    Overground walking, quick stopping, backwards walking, 3 min         Overground walking 100 feet laps,   26-28 seconds, x 10 sets     Overground walking with catching and tossing ball to student physical therapist, contact guard, 4 min x 2    Treadmill 1 6 mph 1 5 min warm-up, 30 sec fast 2 8 mph 60 sec slow 1 6 mph, 12 min total    Treadmill alternating between backwards and forwards, backwards 0 5 - 0 7 mph 1 min, forwards 1 6 mph 1 min  6 min   Overground walking 100 feet laps,   27-30 seconds, x 10 sets     Overground walking with lateral ball toss, CG x4 min x2    Walking between chairs turn to sit x10    Treadmill 1 6 mph 1 5 min warm-up, 30 sec fast 2 8 mph 60 sec slow 1 6 mph, 12 min total 2 min overground walking    Overground walking 200 feet (2 laps)  58 sec  58 sec  57 sec  54 sec  56 sec  55 sec  55 sec  /95, 62 bpm    Treadmill 1 5 mph warm-up 1 5 min, then 2 9 mph fast 30 sec and 1 5 mph slow intervals, using [employer] treadmill with feedback about step length (visual, verbal)  12 min total    Overground obstacle course, up and down 6" step, on airex foam, weaving between foams, over air pads, x 10 min    Sidestepping, then with perturbation, then high stepping with contralateral knee touch, 7 min    Standing catch ball, rotate trunk to reach target 2 feet to right knee height, toss ball, 1 5 min x 2    LE strengthening  (therapeutic exercise   SciFit 12 min L 1 5, about 750 steps                Assessment: Alfredo did well with dynamic balance exercises today  He demonstrates better trunk control and decreased posturing from the left side of the trunk  Continue to challenge balance during therapy in safe ways, continue increasing speed with overground and treadmill walking as able       Plan: Continue per plan of care

## 2020-08-11 ENCOUNTER — OFFICE VISIT (OUTPATIENT)
Dept: PHYSICAL THERAPY | Facility: REHABILITATION | Age: 75
End: 2020-08-11
Payer: COMMERCIAL

## 2020-08-11 DIAGNOSIS — I63.81 LACUNAR INFARCTION (HCC): Primary | ICD-10-CM

## 2020-08-11 PROCEDURE — 97112 NEUROMUSCULAR REEDUCATION: CPT | Performed by: PHYSICAL THERAPIST

## 2020-08-11 NOTE — PROGRESS NOTES
Daily Note     Today's date: 2020  Patient name: Oralee Lipoma  : 1945  MRN: [id number]  Referring provider: Kaylah Adame MD  Dx:   Encounter Diagnosis     ICD-10-CM    1  Lacunar infarction [facility])  I63 81        Subjective: Alfredo is back at home, went to grocery store yesterday, reports it was pretty easy, hangs his cane on a cart        Objective: See treatment diary below    Precautions: CVA, monitor BP, fall risk       2020     /90 145/100 59 bpm After 2 min walking bY 193/98, heart rate 63 bpm /85, heart rate 51 bpm   Neuromuscular re-education Overground walking 100 feet laps,   26-28 seconds, x 10 sets     Overground walking with catching and tossing ball to student physical therapist, contact guard, 4 min x 2    Treadmill 1 6 mph 1 5 min warm-up, 30 sec fast 2 8 mph 60 sec slow 1 6 mph, 12 min total    Treadmill alternating between backwards and forwards, backwards 0 5 - 0 7 mph 1 min, forwards 1 6 mph 1 min  6 min   Overground walking 100 feet laps,   27-30 seconds, x 10 sets     Overground walking with lateral ball toss, CG x4 min x2    Walking between chairs turn to sit x10    Treadmill 1 6 mph 1 5 min warm-up, 30 sec fast 2 8 mph 60 sec slow 1 6 mph, 12 min total 2 min overground walking    Overground walking 200 feet (2 laps)  58 sec  58 sec  57 sec  54 sec  56 sec  55 sec  55 sec  /95, 62 bpm    Treadmill 1 5 mph warm-up 1 5 min, then 2 9 mph fast 30 sec and 1 5 mph slow intervals, using [employer] treadmill with feedback about step length (visual, verbal)  12 min total    Overground obstacle course, up and down 6" step, on airex foam, weaving between foams, over air pads, x 10 min    Sidestepping, then with perturbation, then high stepping with contralateral knee touch, 7 min    Standing catch ball, rotate trunk to reach target 2 feet to right knee height, toss ball, 1 5 min x 2 Treadmill walking 1 6 mph 1 5 min  Then 30 sec intervals 2 8 mph 60 sec rest intervals 1 6 mph, CGA during fast intervals, use of railings, 12 5 min    Overground walking 200 feet laps, x 8  53-55 seconds each set of 2 laps    /96, heart rate 65 bpm    Backwards/forwards intervals on treadmill:  0 6 mph backwards, 1 9 mph forwards, 1 min each, alternating x 4 times each    Overground walking, turning quickly, walking backwards, walking quickly, 4 5 min    Sidestepping in parallel bars with posterior perturbations, 2 min    LE strengthening  (therapeutic exercise SciFit 12 min L 1 5, about 750 steps                Assessment: Alfredo had more difficulty sustaining faster speeds on the treadmill today than previously  He performed better with overground walking  He continues with some imbalance with transitions and turning, continue to practice, but maintains balance with stepping responses if needed        Plan: Continue per plan of care

## 2020-08-13 ENCOUNTER — OFFICE VISIT (OUTPATIENT)
Dept: PHYSICAL THERAPY | Facility: REHABILITATION | Age: 75
End: 2020-08-13
Payer: COMMERCIAL

## 2020-08-13 DIAGNOSIS — I63.81 LACUNAR INFARCTION (HCC): Primary | ICD-10-CM

## 2020-08-13 PROCEDURE — 97112 NEUROMUSCULAR REEDUCATION: CPT | Performed by: PHYSICAL THERAPIST

## 2020-08-13 NOTE — PROGRESS NOTES
Daily Note     Today's date: 2020  Patient name: Rosalva Covarrubias  : 1945  MRN: [id number]  Referring provider: Lucinda Whitlock MD  Dx:   Encounter Diagnosis     ICD-10-CM    1  Lacunar infarction [facility])  I63 81        Subjective: Alfredo is back at home, went to grocery store yesterday, reports it was pretty easy, hangs his cane on a cart        Objective: See treatment diary below    Precautions: CVA, monitor BP, fall risk       2020     /90 145/100 59 bpm After 2 min walking bY 193/98, heart rate 63 bpm /85, heart rate 51 bpm    Neuromuscular re-education Overground walking 100 feet laps,   26-28 seconds, x 10 sets     Overground walking with catching and tossing ball to student physical therapist, contact guard, 4 min x 2    Treadmill 1 6 mph 1 5 min warm-up, 30 sec fast 2 8 mph 60 sec slow 1 6 mph, 12 min total    Treadmill alternating between backwards and forwards, backwards 0 5 - 0 7 mph 1 min, forwards 1 6 mph 1 min  6 min   Overground walking 100 feet laps,   27-30 seconds, x 10 sets     Overground walking with lateral ball toss, CG x4 min x2    Walking between chairs turn to sit x10    Treadmill 1 6 mph 1 5 min warm-up, 30 sec fast 2 8 mph 60 sec slow 1 6 mph, 12 min total 2 min overground walking    Overground walking 200 feet (2 laps)  58 sec  58 sec  57 sec  54 sec  56 sec  55 sec  55 sec  /95, 62 bpm    Treadmill 1 5 mph warm-up 1 5 min, then 2 9 mph fast 30 sec and 1 5 mph slow intervals, using [employer] treadmill with feedback about step length (visual, verbal)  12 min total    Overground obstacle course, up and down 6" step, on airex foam, weaving between foams, over air pads, x 10 min    Sidestepping, then with perturbation, then high stepping with contralateral knee touch, 7 min    Standing catch ball, rotate trunk to reach target 2 feet to right knee height, toss ball, 1 5 min x 2 Treadmill walking 1 6 mph 1 5 min  Then 30 sec intervals 2 8 mph 60 sec rest intervals 1 6 mph, CGA during fast intervals, use of railings, 12 5 min    Overground walking 200 feet laps, x 8  53-55 seconds each set of 2 laps    /96, heart rate 65 bpm    Backwards/forwards intervals on treadmill:  0 6 mph backwards, 1 9 mph forwards, 1 min each, alternating x 4 times each    Overground walking, turning quickly, walking backwards, walking quickly, 4 5 min    Sidestepping in parallel bars with posterior perturbations, 2 min Overground walking 200 feet, timed with feedback:  50 5-58 sec, most 55, 54, 53 or 52 seconds, x 8 total      Treadmill walknig 1 6 mph 1 5 min warm-up then 2 8 mph fast 30 sec and 1 6 mph 60 sec, use of railings, CGA during fast intervals, 12 min    Backwards/forwards on treadmill 0 8 mph backwards, 2 0 mph forwards, 1 min intervals, x 4 each backwards and forwards    Overground walking head movements, turning quickly, backwards, 4 min    Posterior perturbations in parallel bars, 2 min    Tap 2" target, step over, 2 min    LE strengthening  (therapeutic exercise SciFit 12 min L 1 5, about 750 steps                  Assessment: Alfredo was able to slightly increase pace of overground walking today  Complete re-evaluation next week  More stable with walking and turns but form does degrade with fatigue        Plan: Continue per plan of care

## 2020-08-18 ENCOUNTER — OFFICE VISIT (OUTPATIENT)
Dept: PHYSICAL THERAPY | Facility: REHABILITATION | Age: 75
End: 2020-08-18
Payer: COMMERCIAL

## 2020-08-18 DIAGNOSIS — I63.81 LACUNAR INFARCTION (HCC): Primary | ICD-10-CM

## 2020-08-18 PROCEDURE — 97112 NEUROMUSCULAR REEDUCATION: CPT | Performed by: PHYSICAL THERAPIST

## 2020-08-18 NOTE — PROGRESS NOTES
RE-EVALUATION / Treatment Note     Today's date: 2020  Patient name: Kelley Cid  : 1945  MRN: [id number]  Referring provider: Khalida Leon MD  Dx:   Encounter Diagnosis     ICD-10-CM    1  Lacunar infarction ([address] )  I63 81      Subjective: Alfredo reports he's doing okay        Objective: See treatment diary below    Precautions: CVA, monitor BP, fall risk         2020     After 2 min walking bY 193/98, heart rate 63 bpm /85, heart rate 51 bpm     Neuromuscular re-education 2 min overground walking    Overground walking 200 feet (2 laps)  58 sec  58 sec  57 sec  54 sec  56 sec  55 sec  55 sec  /95, 62 bpm    Treadmill 1 5 mph warm-up 1 5 min, then 2 9 mph fast 30 sec and 1 5 mph slow intervals, using [employer] treadmill with feedback about step length (visual, verbal)  12 min total    Overground obstacle course, up and down 6" step, on airex foam, weaving between foams, over air pads, x 10 min    Sidestepping, then with perturbation, then high stepping with contralateral knee touch, 7 min    Standing catch ball, rotate trunk to reach target 2 feet to right knee height, toss ball, 1 5 min x 2 Treadmill walking 1 6 mph 1 5 min  Then 30 sec intervals 2 8 mph 60 sec rest intervals 1 6 mph, CGA during fast intervals, use of railings, 12 5 min    Overground walking 200 feet laps, x 8  53-55 seconds each set of 2 laps    /96, heart rate 65 bpm    Backwards/forwards intervals on treadmill:  0 6 mph backwards, 1 9 mph forwards, 1 min each, alternating x 4 times each    Overground walking, turning quickly, walking backwards, walking quickly, 4 5 min    Sidestepping in parallel bars with posterior perturbations, 2 min Overground walking 200 feet, timed with feedback:  50 5-58 sec, most 55, 54, 53 or 52 seconds, x 8 total      Treadmill walknig 1 6 mph 1 5 min warm-up then 2 8 mph fast 30 sec and 1 6 mph 60 sec, use of railings, CGA during fast intervals, 12 min    Backwards/forwards on treadmill 0 8 mph backwards, 2 0 mph forwards, 1 min intervals, x 4 each backwards and forwards    Overground walking head movements, turning quickly, backwards, 4 min    Posterior perturbations in parallel bars, 2 min    Tap 2" target, step over, 2 min 6 min walk test  3 meter TUG  5 times sit to stand  FGA    Overground walking 200 feet, 61-64 seconds x 6 sets    Treadmill walking 1 6 mph 1 5 min warm-up then 3 0 mph fast intervals 30 sec and 1 6 mph 60 sec slow, 12 min    trialed walking 400 feet with and without off the shelf left AFO, mildly more scuffing with AFO, no significant change in pace      LE strengthening  (therapeutic exercise           Mobility Measures 4/16/2020 5/14/2020 6/23/2020 7/21/2020 8/18/2020   Assistive device used? Rolling Walker Rolling walker for home ambulation, exercises today completed without assistive device Rolling walker none none   5 Time Sit to Stand  (17" chair, arms across chest) Unable from 17" chair    With strong use of arms, 21 3 seconds 35 seconds with hands on knees for reps 3-5 Unable from 17" chair    From 19" chair, 18 3 seconds without pushing from hands 19 7 seconds from 17" chair Unable from 17" chair    20 5 seconds from 19" chair   3 Meter Timed  Up & Go 22 2 seconds  21 5 seconds    18 4 seconds as fast as safely possible   18 3 seconds no assistive device 15 9 seconds no assistive device 11 3 seconds 12 3 seconds self-selected   Walking speed 0 61 meters/second 0 71 meters/second 0 73 meters/second self-selected     Functional Gait Assessment (see below) Deferred for safety 13/30 20/30 21/30 20/30   6 Minute Walk Test (100 foot circular course) 715 feet  Ending heart rate 66 beats/minute    /93, heart rate 66 bpm 810 feet completed with CGA without assistive device 905 feet with close supervision 990 feet without assistive device 1075 feet without assistive device   Patient-Reported Outcome Measure:  Activities-Specific Balance Confidence Scale (ABC Scale) Not completed  Not completed Not completed Not completed     Functional Gait Assessment  2/3 Gait level surface  3/3 Change in gait speed  2/3 Gait with horizontal head turns  3/3 Gait with vertical head turns  3/3 Gait and pivot turn  2/3 Step over obstacle  0/3 Gait with narrow base of support  2/3 Gait with eyes closed  1/3 Ambulating backwards  2/3 Steps  20/30 Total score (less than 22/30 indicates increased risk of fall)       ASSESSMENT:  Alfredo demonstrated gradual further improvement in walking efficiency  He continues with a Functional Gait Assessment score under the target of 22/30, and does demonstrate some posturing into left trunk stiffness and left upper extremity flexion, but the arm's posturing is variable  He infrequently demonstrates loss of balance with transitioning, such as when moving from eyes closed task to eyes open task  Alfredo would likely benefit from continued physical therapy to minimize fall risk and further help improve mobility  SHORT-TERM GOALS: 1 month(s)  1  Patient stands from 17 inch surface without use of hands  MET  2  Patient completes 3 meter timed up and go in 15 seconds, self-selected speed  MET  3  Patient walks at least 850 feet on 6 minute walk test   MET  4  Alfredo scores at least 23/30 on FGA in 1 month  NOT MET  5  Alfredo completes 5 times sit to  12 seconds in 1 month  NOT MET    LONG-TERM GOALS: 3 month(s)  1  Patient returns to prior level of community walking, with assistive device as needed  PROGRESSING  2  Patient walks at least 1200 feet consecutively  MET  Precautions - monitor BP; fall risk    PLAN OF CARE:  Patient will benefit from physical therapy 2 times per week for 1 month  Neuromuscular re-education, therapeutic exercises, and therapeutic activities as outlined in grids

## 2020-08-18 NOTE — LETTER
2020    Awa Odell MD  [address] [place] [address]    Patient: Daniela Holland   YOB: 1945   Date of Visit: 2020     Encounter Diagnosis     ICD-10-CM    1  Lacunar infarction [facility])  I63 81        Dear Dr Dawn Matters:    Thank you for your recent referral of Daniela Holland  Please review the attached evaluation summary from Hiro's recent visit  Please verify that you agree with the plan of care by signing the attached order  If you have any questions or concerns, please do not hesitate to call  I sincerely appreciate the opportunity to share in the care of one of your patients and hope to have another opportunity to work with you in the near future  Sincerely,    Dilan Horvath PT      Referring Provider:      I certify that I have read the below Plan of Care and certify the need for these services furnished under this plan of treatment while under my care  Awa Odell MD  [facility]  [address]: [phone]          Treatment Note / Nela Hdz date: 2020  Patient name: Daniela Holland  : 1945  MRN: [id number]  Referring provider: Krystal Liu MD  Dx:   Encounter Diagnosis     ICD-10-CM    1  [facility]  I63 81      Subjective: Alfredo reports walking on his driveway for exercise, sometimes with a cane, sometimes without      Objective: See treatment diary below    Precautions: CVA, monitor BP, fall risk         2020     After 2 min walking bY 193/98, heart rate 63 bpm /85, heart rate 51 bpm     Neuromuscular re-education 2 min overground walking    Overground walking 200 feet (2 laps)  58 sec  58 sec  57 sec  54 sec  56 sec  55 sec  55 sec  /95, 62 bpm    Treadmill 1 5 mph warm-up 1 5 min, then 2 9 mph fast 30 sec and 1 5 mph slow intervals, using [employer] treadmill with feedback about step length (visual, verbal)  12 min total    Overground obstacle course, up and down 6" step, on airex foam, weaving between foams, over air pads, x 10 min    Sidestepping, then with perturbation, then high stepping with contralateral knee touch, 7 min    Standing catch ball, rotate trunk to reach target 2 feet to right knee height, toss ball, 1 5 min x 2 Treadmill walking 1 6 mph 1 5 min  Then 30 sec intervals 2 8 mph 60 sec rest intervals 1 6 mph, CGA during fast intervals, use of railings, 12 5 min    Overground walking 200 feet laps, x 8  53-55 seconds each set of 2 laps    /96, heart rate 65 bpm    Backwards/forwards intervals on treadmill:  0 6 mph backwards, 1 9 mph forwards, 1 min each, alternating x 4 times each    Overground walking, turning quickly, walking backwards, walking quickly, 4 5 min    Sidestepping in parallel bars with posterior perturbations, 2 min Overground walking 200 feet, timed with feedback:  50 5-58 sec, most 55, 54, 53 or 52 seconds, x 8 total      Treadmill walknig 1 6 mph 1 5 min warm-up then 2 8 mph fast 30 sec and 1 6 mph 60 sec, use of railings, CGA during fast intervals, 12 min    Backwards/forwards on treadmill 0 8 mph backwards, 2 0 mph forwards, 1 min intervals, x 4 each backwards and forwards    Overground walking head movements, turning quickly, backwards, 4 min    Posterior perturbations in parallel bars, 2 min    Tap 2" target, step over, 2 min     LE strengthening  (therapeutic exercise           Mobility Measures 4/16/2020 5/14/2020 6/23/2020 7/21/2020 8/18/2020   Assistive device used?  Rolling Walker Rolling walker for home ambulation, exercises today completed without assistive device Rolling walker none    5 Time Sit to Stand  (17" chair, arms across chest) Unable from 17" chair    With strong use of arms, 21 3 seconds 35 seconds with hands on knees for reps 3-5 Unable from 17" chair    From 19" chair, 18 3 seconds without pushing from hands 19 7 seconds from 17" chair Unable from 17" chair    20 5 seconds from 19" chair   3 Meter Timed  Up & Go 22 2 seconds  21 5 seconds    18 4 seconds as fast as safely possible   18 3 seconds no assistive device 15 9 seconds no assistive device 11 3 seconds 12 3 seconds self-selected   Walking speed 0 61 meters/second 0 71 meters/second 0 73 meters/second self-selected     Functional Gait Assessment (see below) Deferred for safety 13/30 20/30 21/30 20/30   6 Minute Walk Test (100 foot circular course) 715 feet  Ending heart rate 66 beats/minute    /93, heart rate 66 bpm 810 feet completed with CGA without assistive device 905 feet with close supervision 990 feet without assistive device 1075 feet without assistive device   Patient-Reported Outcome Measure: Activities-Specific Balance Confidence Scale (ABC Scale) Not completed  Not completed Not completed Not completed     Functional Gait Assessment  2/3 Gait level surface  3/3 Change in gait speed  2/3 Gait with horizontal head turns  3/3 Gait with vertical head turns  3/3 Gait and pivot turn  2/3 Step over obstacle  0/3 Gait with narrow base of support  2/3 Gait with eyes closed  1/3 Ambulating backwards  2/3 Steps  20/30 Total score (less than 22/30 indicates increased risk of fall)       ASSESSMENT:  Alfredo demonstrated gradual further improvement in dynamic balance and walking efficiency  He continues to progress towards more functional dynamic balance  Recommend continued physical therapy to improve dynamic balance, as he may return home within the next couple weeks  SHORT-TERM GOALS: 1 month(s)  1  Patient stands from 17 inch surface without use of hands  MET  2  Patient completes 3 meter timed up and go in 15 seconds, self-selected speed  MET  3  Patient walks at least 850 feet on 6 minute walk test   MET  4  Alfredo scores at least 23/30 on FGA in 1 month  NOT MET  5  Alfredo completes 5 times sit to  12 seconds in 1 month  NOT MET    LONG-TERM GOALS: 3 month(s)  1   Patient returns to prior level of community walking, with assistive device as needed  PROGRESSING  2  Patient walks at least 1200 feet consecutively  MET  Precautions - monitor BP; fall risk    PLAN OF CARE:  Patient will benefit from physical therapy 2 times per week for 1 month  Neuromuscular re-education, therapeutic exercises, and therapeutic activities as outlined in grids

## 2020-08-20 ENCOUNTER — EVALUATION (OUTPATIENT)
Dept: PHYSICAL THERAPY | Facility: REHABILITATION | Age: 75
End: 2020-08-20
Payer: COMMERCIAL

## 2020-08-20 ENCOUNTER — TRANSCRIBE ORDERS (OUTPATIENT)
Dept: PHYSICAL THERAPY | Facility: REHABILITATION | Age: 75
End: 2020-08-20

## 2020-08-20 DIAGNOSIS — R26.9 GAIT DISORDER: ICD-10-CM

## 2020-08-20 DIAGNOSIS — I63.81 LACUNAR INFARCTION (HCC): Primary | ICD-10-CM

## 2020-08-20 DIAGNOSIS — R26.9 ABNORMALITY OF GAIT: Primary | ICD-10-CM

## 2020-08-20 DIAGNOSIS — R26.9 ABNORMAL GAIT: Primary | ICD-10-CM

## 2020-08-20 DIAGNOSIS — I63.81 LACUNAR INFARCTION (HCC): ICD-10-CM

## 2020-08-20 PROCEDURE — 97112 NEUROMUSCULAR REEDUCATION: CPT

## 2020-08-20 NOTE — LETTER
2020    [facility], [place] [address] [place] [id number]    Patient: Nando Sheridan   YOB: 1945   Date of Visit: 2020     Encounter Diagnosis     ICD-10-CM    1  Lacunar infarction ([facility] )  I63 81    2  Gait disorder  R26 9        Dear Dr Aleah Diaz:    Thank you for your recent referral of Nando Sheridan  Please review the attached evaluation summary from Hiro's recent visit  Please verify that you agree with the plan of care by signing the attached order  If you have any questions or concerns, please do not hesitate to call  I sincerely appreciate the opportunity to share in the care of one of your patients and hope to have another opportunity to work with you in the near future  Sincerely,    Maikel Brown, PT      Referring Provider:      I certify that I have read the below Plan of Care and certify the need for these services furnished under this plan of treatment while under my care  [facility], [place] [address] [place] [address] [address]: [phone]          Discharge Summary/ Treatment Note     Today's date: 2020  Patient name: Nando Sheridan  : 1945  MRN: [id number]  Referring provider: Heidi Burkett MD  Dx:   Encounter Diagnosis     ICD-10-CM    1  [facility]  I63 81    2  Gait disorder  R26 9      Subjective: Patient seen in PT  15 minutes late to appointment  Denies falls or pain  Objective: See treatment diary below  PT verbal and tactile cues for technique and progression      Precautions: CVA, monitor BP, fall risk** Patient on BB, cannot use HR for exertion, use modified RPE         2020     After 2 min walking bY 193/98, heart rate 63 bpm /85, heart rate 51 bpm   /91, HR 60 BPM pre tx     Neuromuscular re-education 2 min overground walking    Overground walking 200 feet (2 laps)  58 sec  58 sec  57 sec  54 sec  56 sec  55 sec  55 sec  /95, 62 bpm    Treadmill 1 5 mph warm-up 1 5 min, then 2 9 mph fast 30 sec and 1 5 mph slow intervals, using [employer] treadmill with feedback about step length (visual, verbal)  12 min total    Overground obstacle course, up and down 6" step, on airex foam, weaving between foams, over air pads, x 10 min    Sidestepping, then with perturbation, then high stepping with contralateral knee touch, 7 min    Standing catch ball, rotate trunk to reach target 2 feet to right knee height, toss ball, 1 5 min x 2 Treadmill walking 1 6 mph 1 5 min  Then 30 sec intervals 2 8 mph 60 sec rest intervals 1 6 mph, CGA during fast intervals, use of railings, 12 5 min    Overground walking 200 feet laps, x 8  53-55 seconds each set of 2 laps    /96, heart rate 65 bpm    Backwards/forwards intervals on treadmill:  0 6 mph backwards, 1 9 mph forwards, 1 min each, alternating x 4 times each    Overground walking, turning quickly, walking backwards, walking quickly, 4 5 min    Sidestepping in parallel bars with posterior perturbations, 2 min Overground walking 200 feet, timed with feedback:  50 5-58 sec, most 55, 54, 53 or 52 seconds, x 8 total      Treadmill walknig 1 6 mph 1 5 min warm-up then 2 8 mph fast 30 sec and 1 6 mph 60 sec, use of railings, CGA during fast intervals, 12 min    Backwards/forwards on treadmill 0 8 mph backwards, 2 0 mph forwards, 1 min intervals, x 4 each backwards and forwards    Overground walking head movements, turning quickly, backwards, 4 min    Posterior perturbations in parallel bars, 2 min    Tap 2" target, step over, 2 min 6 min walk test  3 meter TUG  5 times sit to stand  FGA    Overground walking 200 feet, 61-64 seconds x 6 sets    Treadmill walking 1 6 mph 1 5 min warm-up then 3 0 mph fast intervals 30 sec and 1 6 mph 60 sec slow, 12 min    trialed walking 400 feet with and without off the shelf left AFO, mildly more scuffing with AFO, no significant change in pace   Treadmill walking gait  on [employer] 60 cm step length 1 5 mph warm up x 5 min; RPE 7/10 Speed 2 3-2 0 mph on gait  w/ visual biofeedback  Reduced to 1 7 mph 6/10 RPE     Overground walking head movements, turning quickly, backwards, 4 min      Alt toe tap on 6" step no UE 20x2    1 UE step up 6" step x 10 B/L      Trialed gait with 1/2 heel lift right shoe with noted left stride length improvement, also improved gait with properly sized [facility]- patient refusing SPC use             LE strengthening  (therapeutic exercise            Mobility Measures 4/16/2020 5/14/2020 6/23/2020 7/21/2020 8/18/2020   Assistive device used? Rolling Walker Rolling walker for home ambulation, exercises today completed without assistive device Rolling walker none none   5 Time Sit to Stand  (17" chair, arms across chest) Unable from 17" chair    With strong use of arms, 21 3 seconds 35 seconds with hands on knees for reps 3-5 Unable from 17" chair    From 19" chair, 18 3 seconds without pushing from hands 19 7 seconds from 17" chair Unable from 17" chair    20 5 seconds from 19" chair   3 Meter Timed  Up & Go 22 2 seconds  21 5 seconds    18 4 seconds as fast as safely possible   18 3 seconds no assistive device 15 9 seconds no assistive device 11 3 seconds 12 3 seconds self-selected   Walking speed 0 61 meters/second 0 71 meters/second 0 73 meters/second self-selected     Functional Gait Assessment (see below) Deferred for safety 13/30 20/30 21/30 20/30   6 Minute Walk Test (100 foot circular course) 715 feet  Ending heart rate 66 beats/minute    /93, heart rate 66 bpm 810 feet completed with CGA without assistive device 905 feet with close supervision 990 feet without assistive device 1075 feet without assistive device   Patient-Reported Outcome Measure: Activities-Specific Balance Confidence Scale (ABC Scale) Not completed    Not completed Not completed Not completed     Functional Gait Assessment  2/3 Gait level surface  3/3 Change in gait speed  2/3 Gait with horizontal head turns  3/3 Gait with vertical head turns  3/3 Gait and pivot turn  2/3 Step over obstacle  0/3 Gait with narrow base of support  2/3 Gait with eyes closed  1/3 Ambulating backwards  2/3 Steps  20/30 Total score (less than 22/30 indicates increased risk of fall)       ASSESSMENT:  Alfredo demonstrated gradual further improvement in walking efficiency and partially met PT goals  Alfredo would likely benefit from continued physical therapy to minimize fall risk and further help improve mobility, however, patient requested to stop PT at this time  He was instructed to call PT if he notices a decline in balance or mobility- voiced understanding  This was discussed with his primary therapist who was in agreement to discharge PT per patient request        SHORT-TERM GOALS: 1 month(s)  1  Patient stands from 17 inch surface without use of hands  MET  2  Patient completes 3 meter timed up and go in 15 seconds, self-selected speed  MET  3  Patient walks at least 850 feet on 6 minute walk test   MET  4  Alfredo scores at least 23/30 on FGA in 1 month  NOT MET  5  Alfredo completes 5 times sit to  12 seconds in 1 month  NOT MET    LONG-TERM GOALS: 3 month(s)  1  Patient returns to prior level of community walking, with assistive device as needed  PROGRESSING  2  Patient walks at least 1200 feet consecutively  MET  Precautions - monitor BP; fall risk    PLAN OF CARE:  Patient requests to stop PT and he will continue walking program at home despite PT recommendations to continue PT   Will discharge PT

## 2020-08-20 NOTE — PROGRESS NOTES
Discharge Summary/ Treatment Note     Today's date: 2020  Patient name: Sharath Melara  : 1945  MRN: [id number]  Referring provider: aDniel Jansen MD  Dx:   Encounter Diagnosis     ICD-10-CM    1  Lacunar infarction ([facility] )  I63 81    2  Gait disorder  R26 9      Subjective: Patient seen in PT  15 minutes late to appointment  Denies falls or pain  Objective: See treatment diary below  PT verbal and tactile cues for technique and progression      Precautions: CVA, monitor BP, fall risk** Patient on BB, cannot use HR for exertion, use modified RPE         2020     After 2 min walking bY 193/98, heart rate 63 bpm /85, heart rate 51 bpm   /91, HR 60 BPM pre tx     Neuromuscular re-education 2 min overground walking    Overground walking 200 feet (2 laps)  58 sec  58 sec  57 sec  54 sec  56 sec  55 sec  55 sec  /95, 62 bpm    Treadmill 1 5 mph warm-up 1 5 min, then 2 9 mph fast 30 sec and 1 5 mph slow intervals, using [employer] treadmill with feedback about step length (visual, verbal)  12 min total    Overground obstacle course, up and down 6" step, on airex foam, weaving between foams, over air pads, x 10 min    Sidestepping, then with perturbation, then high stepping with contralateral knee touch, 7 min    Standing catch ball, rotate trunk to reach target 2 feet to right knee height, toss ball, 1 5 min x 2 Treadmill walking 1 6 mph 1 5 min  Then 30 sec intervals 2 8 mph 60 sec rest intervals 1 6 mph, CGA during fast intervals, use of railings, 12 5 min    Overground walking 200 feet laps, x 8  53-55 seconds each set of 2 laps    /96, heart rate 65 bpm    Backwards/forwards intervals on treadmill:  0 6 mph backwards, 1 9 mph forwards, 1 min each, alternating x 4 times each    Overground walking, turning quickly, walking backwards, walking quickly, 4 5 min    Sidestepping in parallel bars with posterior perturbations, 2 min Overground walking 200 feet, timed with feedback:  50 5-58 sec, most 55, 54, 53 or 52 seconds, x 8 total      Treadmill walknig 1 6 mph 1 5 min warm-up then 2 8 mph fast 30 sec and 1 6 mph 60 sec, use of railings, CGA during fast intervals, 12 min    Backwards/forwards on treadmill 0 8 mph backwards, 2 0 mph forwards, 1 min intervals, x 4 each backwards and forwards    Overground walking head movements, turning quickly, backwards, 4 min    Posterior perturbations in parallel bars, 2 min    Tap 2" target, step over, 2 min 6 min walk test  3 meter TUG  5 times sit to stand  FGA    Overground walking 200 feet, 61-64 seconds x 6 sets    Treadmill walking 1 6 mph 1 5 min warm-up then 3 0 mph fast intervals 30 sec and 1 6 mph 60 sec slow, 12 min    trialed walking 400 feet with and without off the shelf left AFO, mildly more scuffing with AFO, no significant change in pace   Treadmill walking gait  on [employer] 60 cm step length 1 5 mph warm up x 5 min; RPE 7/10 Speed 2 3-2 0 mph on gait  w/ visual biofeedback  Reduced to 1 7 mph 6/10 RPE     Overground walking head movements, turning quickly, backwards, 4 min      Alt toe tap on 6" step no UE 20x2    1 UE step up 6" step x 10 B/L      Trialed gait with 1/2 heel lift right shoe with noted left stride length improvement, also improved gait with properly sized [facility]- patient refusing SPC use             LE strengthening  (therapeutic exercise            Mobility Measures 4/16/2020 5/14/2020 6/23/2020 7/21/2020 8/18/2020   Assistive device used?  Rolling Walker Rolling walker for home ambulation, exercises today completed without assistive device Rolling walker none none   5 Time Sit to Stand  (17" chair, arms across chest) Unable from 17" chair    With strong use of arms, 21 3 seconds 35 seconds with hands on knees for reps 3-5 Unable from 17" chair    From 19" chair, 18 3 seconds without pushing from hands 19 7 seconds from 17" chair Unable from 17" chair    20 5 seconds from 19" chair   3 Meter Timed  Up & Go 22 2 seconds  21 5 seconds    18 4 seconds as fast as safely possible   18 3 seconds no assistive device 15 9 seconds no assistive device 11 3 seconds 12 3 seconds self-selected   Walking speed 0 61 meters/second 0 71 meters/second 0 73 meters/second self-selected     Functional Gait Assessment (see below) Deferred for safety 13/30 20/30 21/30 20/30   6 Minute Walk Test (100 foot circular course) 715 feet  Ending heart rate 66 beats/minute    /93, heart rate 66 bpm 810 feet completed with CGA without assistive device 905 feet with close supervision 990 feet without assistive device 1075 feet without assistive device   Patient-Reported Outcome Measure: Activities-Specific Balance Confidence Scale (ABC Scale) Not completed  Not completed Not completed Not completed     Functional Gait Assessment  2/3 Gait level surface  3/3 Change in gait speed  2/3 Gait with horizontal head turns  3/3 Gait with vertical head turns  3/3 Gait and pivot turn  2/3 Step over obstacle  0/3 Gait with narrow base of support  2/3 Gait with eyes closed  1/3 Ambulating backwards  2/3 Steps  20/30 Total score (less than 22/30 indicates increased risk of fall)       ASSESSMENT:  Alfredo demonstrated gradual further improvement in walking efficiency and partially met PT goals  Alfredo would likely benefit from continued physical therapy to minimize fall risk and further help improve mobility, however, patient requested to stop PT at this time  He was instructed to call PT if he notices a decline in balance or mobility- voiced understanding  This was discussed with his primary therapist who was in agreement to discharge PT per patient request        SHORT-TERM GOALS: 1 month(s)  1  Patient stands from 17 inch surface without use of hands  MET  2  Patient completes 3 meter timed up and go in 15 seconds, self-selected speed  MET  3  Patient walks at least 850 feet on 6 minute walk test   MET      4  Alfredo scores at least 23/30 on FGA in 1 month  NOT MET  5  Alfredo completes 5 times sit to  12 seconds in 1 month  NOT MET    LONG-TERM GOALS: 3 month(s)  1  Patient returns to prior level of community walking, with assistive device as needed  PROGRESSING  2  Patient walks at least 1200 feet consecutively  MET  Precautions - monitor BP; fall risk    PLAN OF CARE:  Patient requests to stop PT and he will continue walking program at home despite PT recommendations to continue PT   Will discharge PT

## 2023-06-18 ENCOUNTER — HOSPITAL ENCOUNTER (INPATIENT)
Facility: HOSPITAL | Age: 78
LOS: 3 days | Discharge: HOME | DRG: 281 | End: 2023-06-22
Attending: EMERGENCY MEDICINE | Admitting: INTERNAL MEDICINE
Payer: COMMERCIAL

## 2023-06-18 DIAGNOSIS — I21.4 NSTEMI (NON-ST ELEVATED MYOCARDIAL INFARCTION) (HCC): Primary | ICD-10-CM

## 2023-06-18 DIAGNOSIS — R77.8 ELEVATED TROPONIN: ICD-10-CM

## 2023-06-18 DIAGNOSIS — I25.10 CORONARY ARTERY DISEASE INVOLVING NATIVE CORONARY ARTERY: ICD-10-CM

## 2023-06-18 DIAGNOSIS — R07.9 CHEST PAIN: ICD-10-CM

## 2023-06-18 PROCEDURE — 99285 EMERGENCY DEPT VISIT HI MDM: CPT

## 2023-06-19 ENCOUNTER — APPOINTMENT (INPATIENT)
Dept: NON INVASIVE DIAGNOSTICS | Facility: HOSPITAL | Age: 78
DRG: 281 | End: 2023-06-19
Payer: COMMERCIAL

## 2023-06-19 ENCOUNTER — APPOINTMENT (EMERGENCY)
Dept: RADIOLOGY | Facility: HOSPITAL | Age: 78
DRG: 281 | End: 2023-06-19
Payer: COMMERCIAL

## 2023-06-19 PROBLEM — I21.4 NSTEMI (NON-ST ELEVATED MYOCARDIAL INFARCTION) (HCC): Status: ACTIVE | Noted: 2023-06-19

## 2023-06-19 PROBLEM — Z91.89 COMPLIANCE WITH MEDICATION REGIMEN: Status: ACTIVE | Noted: 2023-06-19

## 2023-06-19 PROBLEM — Z86.73 HISTORY OF CVA (CEREBROVASCULAR ACCIDENT): Status: ACTIVE | Noted: 2023-06-19

## 2023-06-19 PROBLEM — R07.9 CHEST PAIN: Status: ACTIVE | Noted: 2023-06-19

## 2023-06-19 PROBLEM — I45.2 BIFASCICULAR BLOCK: Status: ACTIVE | Noted: 2023-06-19

## 2023-06-19 LAB
2HR DELTA HS TROPONIN: 97 NG/L
4HR DELTA HS TROPONIN: 323 NG/L
ALBUMIN SERPL BCP-MCNC: 4.3 G/DL (ref 3.5–5)
ALP SERPL-CCNC: 56 U/L (ref 34–104)
ALT SERPL W P-5'-P-CCNC: 10 U/L (ref 7–52)
ANION GAP SERPL CALCULATED.3IONS-SCNC: 12 MMOL/L (ref 4–13)
AORTIC ROOT: 3.1 CM
AORTIC VALVE MEAN VELOCITY: 13.5 M/S
APICAL FOUR CHAMBER EJECTION FRACTION: 66 %
APTT PPP: 28 SECONDS (ref 23–37)
APTT PPP: 64 SECONDS (ref 23–37)
APTT PPP: 72 SECONDS (ref 23–37)
AST SERPL W P-5'-P-CCNC: 14 U/L (ref 13–39)
ATRIAL RATE: 81 BPM
ATRIAL RATE: 87 BPM
ATRIAL RATE: 91 BPM
AV AREA BY CONTINUOUS VTI: 1.9 CM2
AV AREA PEAK VELOCITY: 1.6 CM2
AV LVOT MEAN GRADIENT: 2 MMHG
AV LVOT PEAK GRADIENT: 3 MMHG
AV MEAN GRADIENT: 8 MMHG
AV PEAK GRADIENT: 13 MMHG
AV VALVE AREA: 1.86 CM2
AV VELOCITY RATIO: 0.5
BACTERIA UR QL AUTO: ABNORMAL /HPF
BASOPHILS # BLD AUTO: 0.05 THOUSANDS/ÂΜL (ref 0–0.1)
BASOPHILS NFR BLD AUTO: 1 % (ref 0–1)
BILIRUB SERPL-MCNC: 0.39 MG/DL (ref 0.2–1)
BILIRUB UR QL STRIP: NEGATIVE
BNP SERPL-MCNC: 287 PG/ML (ref 0–100)
BUN SERPL-MCNC: 14 MG/DL (ref 5–25)
CALCIUM SERPL-MCNC: 8.9 MG/DL (ref 8.4–10.2)
CARDIAC TROPONIN I PNL SERPL HS: 102 NG/L
CARDIAC TROPONIN I PNL SERPL HS: 199 NG/L
CARDIAC TROPONIN I PNL SERPL HS: 425 NG/L
CHLORIDE SERPL-SCNC: 107 MMOL/L (ref 96–108)
CLARITY UR: CLEAR
CO2 SERPL-SCNC: 24 MMOL/L (ref 21–32)
COLOR UR: ABNORMAL
CREAT SERPL-MCNC: 0.96 MG/DL (ref 0.6–1.3)
DOP CALC AO PEAK VEL: 1.82 M/S
DOP CALC AO VTI: 37.08 CM
DOP CALC LVOT AREA: 3.14 CM2
DOP CALC LVOT DIAMETER: 2 CM
DOP CALC LVOT PEAK VEL VTI: 21.94 CM
DOP CALC LVOT PEAK VEL: 0.91 M/S
DOP CALC LVOT STROKE INDEX: 36 ML/M2
DOP CALC LVOT STROKE VOLUME: 68.89 CM3
E WAVE DECELERATION TIME: 206 MS
EOSINOPHIL # BLD AUTO: 0.18 THOUSAND/ÂΜL (ref 0–0.61)
EOSINOPHIL NFR BLD AUTO: 2 % (ref 0–6)
ERYTHROCYTE [DISTWIDTH] IN BLOOD BY AUTOMATED COUNT: 13.3 % (ref 11.6–15.1)
EST. AVERAGE GLUCOSE BLD GHB EST-MCNC: 123 MG/DL
FRACTIONAL SHORTENING: 28 % (ref 28–44)
GFR SERPL CREATININE-BSD FRML MDRD: 74 ML/MIN/1.73SQ M
GLUCOSE SERPL-MCNC: 151 MG/DL (ref 65–140)
GLUCOSE UR STRIP-MCNC: ABNORMAL MG/DL
HBA1C MFR BLD: 5.9 %
HCT VFR BLD AUTO: 39.7 % (ref 36.5–49.3)
HGB BLD-MCNC: 12.9 G/DL (ref 12–17)
HGB UR QL STRIP.AUTO: NEGATIVE
IMM GRANULOCYTES # BLD AUTO: 0.05 THOUSAND/UL (ref 0–0.2)
IMM GRANULOCYTES NFR BLD AUTO: 1 % (ref 0–2)
INR PPP: 0.99 (ref 0.84–1.19)
INTERVENTRICULAR SEPTUM IN DIASTOLE (PARASTERNAL SHORT AXIS VIEW): 1.4 CM
INTERVENTRICULAR SEPTUM: 1.4 CM (ref 0.6–1.1)
IVC: 19 MM
KETONES UR STRIP-MCNC: ABNORMAL MG/DL
LAAS-AP2: 20.5 CM2
LAAS-AP4: 20.5 CM2
LEFT ATRIUM AREA SYSTOLE SINGLE PLANE A4C: 20.5 CM2
LEFT ATRIUM SIZE: 3.9 CM
LEFT INTERNAL DIMENSION IN SYSTOLE: 3.6 CM (ref 2.1–4)
LEFT VENTRICULAR INTERNAL DIMENSION IN DIASTOLE: 5 CM (ref 3.5–6)
LEFT VENTRICULAR POSTERIOR WALL IN END DIASTOLE: 1.3 CM
LEFT VENTRICULAR STROKE VOLUME: 67 ML
LEUKOCYTE ESTERASE UR QL STRIP: NEGATIVE
LVSV (TEICH): 67 ML
LYMPHOCYTES # BLD AUTO: 2.71 THOUSANDS/ÂΜL (ref 0.6–4.47)
LYMPHOCYTES NFR BLD AUTO: 27 % (ref 14–44)
MAGNESIUM SERPL-MCNC: 2.1 MG/DL (ref 1.9–2.7)
MCH RBC QN AUTO: 29.5 PG (ref 26.8–34.3)
MCHC RBC AUTO-ENTMCNC: 32.5 G/DL (ref 31.4–37.4)
MCV RBC AUTO: 91 FL (ref 82–98)
MONOCYTES # BLD AUTO: 0.74 THOUSAND/ÂΜL (ref 0.17–1.22)
MONOCYTES NFR BLD AUTO: 7 % (ref 4–12)
MUCOUS THREADS UR QL AUTO: ABNORMAL
MV E'TISSUE VEL-LAT: 10 CM/S
MV E'TISSUE VEL-SEP: 5 CM/S
MV PEAK A VEL: 0.84 M/S
MV PEAK E VEL: 65 CM/S
MV STENOSIS PRESSURE HALF TIME: 60 MS
MV VALVE AREA P 1/2 METHOD: 3.67 CM2
NEUTROPHILS # BLD AUTO: 6.46 THOUSANDS/ÂΜL (ref 1.85–7.62)
NEUTS SEG NFR BLD AUTO: 62 % (ref 43–75)
NITRITE UR QL STRIP: NEGATIVE
NON-SQ EPI CELLS URNS QL MICRO: ABNORMAL /HPF
NRBC BLD AUTO-RTO: 0 /100 WBCS
P AXIS: 40 DEGREES
P AXIS: 43 DEGREES
P AXIS: 58 DEGREES
PH UR STRIP.AUTO: 7 [PH]
PLATELET # BLD AUTO: 237 THOUSANDS/UL (ref 149–390)
PMV BLD AUTO: 10.4 FL (ref 8.9–12.7)
POTASSIUM SERPL-SCNC: 3.7 MMOL/L (ref 3.5–5.3)
PR INTERVAL: 154 MS
PR INTERVAL: 194 MS
PR INTERVAL: 200 MS
PROT SERPL-MCNC: 7.1 G/DL (ref 6.4–8.4)
PROT UR STRIP-MCNC: ABNORMAL MG/DL
PROTHROMBIN TIME: 13.1 SECONDS (ref 11.6–14.5)
QRS AXIS: -22 DEGREES
QRS AXIS: -57 DEGREES
QRS AXIS: -65 DEGREES
QRSD INTERVAL: 144 MS
QRSD INTERVAL: 150 MS
QRSD INTERVAL: 150 MS
QT INTERVAL: 438 MS
QT INTERVAL: 456 MS
QT INTERVAL: 460 MS
QTC INTERVAL: 529 MS
QTC INTERVAL: 538 MS
QTC INTERVAL: 553 MS
RA PRESSURE ESTIMATED: 3 MMHG
RBC # BLD AUTO: 4.38 MILLION/UL (ref 3.88–5.62)
RBC #/AREA URNS AUTO: ABNORMAL /HPF
RIGHT ATRIUM AREA SYSTOLE A4C: 13.7 CM2
RIGHT VENTRICLE ID DIMENSION: 3.6 CM
RV PSP: 17 MMHG
SL CV LEFT ATRIUM LENGTH A2C: 5.4 CM
SL CV LV EF: 66
SL CV PED ECHO LEFT VENTRICLE DIASTOLIC VOLUME (MOD BIPLANE) 2D: 121 ML
SL CV PED ECHO LEFT VENTRICLE SYSTOLIC VOLUME (MOD BIPLANE) 2D: 54 ML
SODIUM SERPL-SCNC: 143 MMOL/L (ref 135–147)
SP GR UR STRIP.AUTO: 1.02 (ref 1–1.03)
T WAVE AXIS: 45 DEGREES
T WAVE AXIS: 55 DEGREES
T WAVE AXIS: 8 DEGREES
TR MAX PG: 14 MMHG
TR PEAK VELOCITY: 1.9 M/S
TRICUSPID ANNULAR PLANE SYSTOLIC EXCURSION: 1.8 CM
TRICUSPID VALVE PEAK REGURGITATION VELOCITY: 1.85 M/S
UROBILINOGEN UR STRIP-ACNC: <2 MG/DL
VENTRICULAR RATE: 81 BPM
VENTRICULAR RATE: 87 BPM
VENTRICULAR RATE: 91 BPM
WBC # BLD AUTO: 10.19 THOUSAND/UL (ref 4.31–10.16)
WBC #/AREA URNS AUTO: ABNORMAL /HPF

## 2023-06-19 PROCEDURE — 99223 1ST HOSP IP/OBS HIGH 75: CPT | Performed by: PHYSICIAN ASSISTANT

## 2023-06-19 PROCEDURE — 83880 ASSAY OF NATRIURETIC PEPTIDE: CPT | Performed by: EMERGENCY MEDICINE

## 2023-06-19 PROCEDURE — 96374 THER/PROPH/DIAG INJ IV PUSH: CPT

## 2023-06-19 PROCEDURE — 93306 TTE W/DOPPLER COMPLETE: CPT | Performed by: INTERNAL MEDICINE

## 2023-06-19 PROCEDURE — 99223 1ST HOSP IP/OBS HIGH 75: CPT | Performed by: INTERNAL MEDICINE

## 2023-06-19 PROCEDURE — 85730 THROMBOPLASTIN TIME PARTIAL: CPT | Performed by: INTERNAL MEDICINE

## 2023-06-19 PROCEDURE — 83735 ASSAY OF MAGNESIUM: CPT | Performed by: EMERGENCY MEDICINE

## 2023-06-19 PROCEDURE — 81001 URINALYSIS AUTO W/SCOPE: CPT | Performed by: PHYSICIAN ASSISTANT

## 2023-06-19 PROCEDURE — 85730 THROMBOPLASTIN TIME PARTIAL: CPT | Performed by: PHYSICIAN ASSISTANT

## 2023-06-19 PROCEDURE — 93005 ELECTROCARDIOGRAM TRACING: CPT

## 2023-06-19 PROCEDURE — 85730 THROMBOPLASTIN TIME PARTIAL: CPT | Performed by: EMERGENCY MEDICINE

## 2023-06-19 PROCEDURE — 71045 X-RAY EXAM CHEST 1 VIEW: CPT

## 2023-06-19 PROCEDURE — 85025 COMPLETE CBC W/AUTO DIFF WBC: CPT | Performed by: EMERGENCY MEDICINE

## 2023-06-19 PROCEDURE — 85610 PROTHROMBIN TIME: CPT | Performed by: EMERGENCY MEDICINE

## 2023-06-19 PROCEDURE — 93010 ELECTROCARDIOGRAM REPORT: CPT | Performed by: STUDENT IN AN ORGANIZED HEALTH CARE EDUCATION/TRAINING PROGRAM

## 2023-06-19 PROCEDURE — 80053 COMPREHEN METABOLIC PANEL: CPT | Performed by: EMERGENCY MEDICINE

## 2023-06-19 PROCEDURE — 84484 ASSAY OF TROPONIN QUANT: CPT | Performed by: EMERGENCY MEDICINE

## 2023-06-19 PROCEDURE — 83036 HEMOGLOBIN GLYCOSYLATED A1C: CPT | Performed by: PHYSICIAN ASSISTANT

## 2023-06-19 PROCEDURE — 36415 COLL VENOUS BLD VENIPUNCTURE: CPT | Performed by: EMERGENCY MEDICINE

## 2023-06-19 PROCEDURE — 93306 TTE W/DOPPLER COMPLETE: CPT

## 2023-06-19 RX ORDER — CLOPIDOGREL BISULFATE 75 MG/1
600 TABLET ORAL ONCE
Status: DISCONTINUED | OUTPATIENT
Start: 2023-06-19 | End: 2023-06-19

## 2023-06-19 RX ORDER — HEPARIN SODIUM 1000 [USP'U]/ML
4000 INJECTION, SOLUTION INTRAVENOUS; SUBCUTANEOUS ONCE
Status: COMPLETED | OUTPATIENT
Start: 2023-06-19 | End: 2023-06-19

## 2023-06-19 RX ORDER — AMLODIPINE BESYLATE 10 MG/1
10 TABLET ORAL DAILY
Status: DISCONTINUED | OUTPATIENT
Start: 2023-06-19 | End: 2023-06-19

## 2023-06-19 RX ORDER — AMLODIPINE BESYLATE 10 MG/1
10 TABLET ORAL DAILY
Status: DISCONTINUED | OUTPATIENT
Start: 2023-06-20 | End: 2023-06-22 | Stop reason: HOSPADM

## 2023-06-19 RX ORDER — HEPARIN SODIUM 10000 [USP'U]/100ML
3-20 INJECTION, SOLUTION INTRAVENOUS
Status: DISCONTINUED | OUTPATIENT
Start: 2023-06-19 | End: 2023-06-20

## 2023-06-19 RX ORDER — ONDANSETRON 2 MG/ML
4 INJECTION INTRAMUSCULAR; INTRAVENOUS ONCE
Status: COMPLETED | OUTPATIENT
Start: 2023-06-19 | End: 2023-06-19

## 2023-06-19 RX ORDER — CLOPIDOGREL BISULFATE 75 MG/1
300 TABLET ORAL ONCE
Status: COMPLETED | OUTPATIENT
Start: 2023-06-19 | End: 2023-06-19

## 2023-06-19 RX ORDER — LISINOPRIL 20 MG/1
40 TABLET ORAL DAILY
Status: DISCONTINUED | OUTPATIENT
Start: 2023-06-19 | End: 2023-06-19

## 2023-06-19 RX ORDER — ASPIRIN 325 MG
325 TABLET ORAL ONCE
Status: DISCONTINUED | OUTPATIENT
Start: 2023-06-19 | End: 2023-06-19

## 2023-06-19 RX ORDER — HEPARIN SODIUM 1000 [USP'U]/ML
4000 INJECTION, SOLUTION INTRAVENOUS; SUBCUTANEOUS EVERY 6 HOURS PRN
Status: DISCONTINUED | OUTPATIENT
Start: 2023-06-19 | End: 2023-06-22 | Stop reason: HOSPADM

## 2023-06-19 RX ORDER — ASPIRIN 81 MG/1
81 TABLET, CHEWABLE ORAL DAILY
Status: DISCONTINUED | OUTPATIENT
Start: 2023-06-20 | End: 2023-06-22 | Stop reason: HOSPADM

## 2023-06-19 RX ORDER — ASPIRIN 81 MG/1
324 TABLET, CHEWABLE ORAL ONCE
Status: COMPLETED | OUTPATIENT
Start: 2023-06-19 | End: 2023-06-19

## 2023-06-19 RX ORDER — METOPROLOL SUCCINATE 50 MG/1
50 TABLET, EXTENDED RELEASE ORAL DAILY
Status: DISCONTINUED | OUTPATIENT
Start: 2023-06-19 | End: 2023-06-20

## 2023-06-19 RX ORDER — NITROGLYCERIN 0.4 MG/1
0.4 TABLET SUBLINGUAL
Status: DISCONTINUED | OUTPATIENT
Start: 2023-06-19 | End: 2023-06-22 | Stop reason: HOSPADM

## 2023-06-19 RX ORDER — CLOPIDOGREL BISULFATE 75 MG/1
75 TABLET ORAL DAILY
Status: DISCONTINUED | OUTPATIENT
Start: 2023-06-20 | End: 2023-06-22 | Stop reason: HOSPADM

## 2023-06-19 RX ORDER — AMLODIPINE BESYLATE 10 MG/1
1 TABLET ORAL DAILY
COMMUNITY
Start: 2023-04-06

## 2023-06-19 RX ORDER — LISINOPRIL 20 MG/1
40 TABLET ORAL DAILY
Status: DISCONTINUED | OUTPATIENT
Start: 2023-06-19 | End: 2023-06-22 | Stop reason: HOSPADM

## 2023-06-19 RX ORDER — ATORVASTATIN CALCIUM 80 MG/1
80 TABLET, FILM COATED ORAL
Status: DISCONTINUED | OUTPATIENT
Start: 2023-06-19 | End: 2023-06-22 | Stop reason: HOSPADM

## 2023-06-19 RX ORDER — HEPARIN SODIUM 1000 [USP'U]/ML
2000 INJECTION, SOLUTION INTRAVENOUS; SUBCUTANEOUS EVERY 6 HOURS PRN
Status: DISCONTINUED | OUTPATIENT
Start: 2023-06-19 | End: 2023-06-22 | Stop reason: HOSPADM

## 2023-06-19 RX ORDER — LISINOPRIL 20 MG/1
40 TABLET ORAL DAILY
Status: DISCONTINUED | OUTPATIENT
Start: 2023-06-20 | End: 2023-06-19

## 2023-06-19 RX ADMIN — NITROGLYCERIN 0.4 MG: 0.4 TABLET SUBLINGUAL at 05:57

## 2023-06-19 RX ADMIN — ASPIRIN 81 MG 324 MG: 81 TABLET ORAL at 06:13

## 2023-06-19 RX ADMIN — HEPARIN SODIUM 12 UNITS/KG/HR: 10000 INJECTION, SOLUTION INTRAVENOUS at 02:15

## 2023-06-19 RX ADMIN — HEPARIN SODIUM 4000 UNITS: 1000 INJECTION INTRAVENOUS; SUBCUTANEOUS at 02:10

## 2023-06-19 RX ADMIN — NITROGLYCERIN 0.4 MG: 0.4 TABLET SUBLINGUAL at 05:52

## 2023-06-19 RX ADMIN — ATORVASTATIN CALCIUM 80 MG: 80 TABLET, FILM COATED ORAL at 16:48

## 2023-06-19 RX ADMIN — CLOPIDOGREL BISULFATE 300 MG: 75 TABLET ORAL at 10:00

## 2023-06-19 RX ADMIN — MORPHINE SULFATE 2 MG: 2 INJECTION, SOLUTION INTRAMUSCULAR; INTRAVENOUS at 04:16

## 2023-06-19 RX ADMIN — ONDANSETRON 4 MG: 2 INJECTION INTRAMUSCULAR; INTRAVENOUS at 01:20

## 2023-06-19 NOTE — ASSESSMENT & PLAN NOTE
Suspected pt w/hx of cva w/substernal chest pain nausea abd abd pain after lifting a heavy rug onto his clotheslines due to water overflowing from the sink on it   -ekgs w/bifascicular block st depressions in v2-6 troponin 102~>199~>425   -trial morphine/nitro for cp  If no improvement will switch to ntg paste  -continue heparin gtt started in ed will asa load and continue w/asa 81mg  Pt was previously on plavix for his of cva w/baby asa as well last taken 4 days ago    Will plan to resume this pending d/w cardiology given no new stroke like s/sx and possible further cardiac diagnostics  -npo consult cardiology

## 2023-06-19 NOTE — CONSULTS
Consult - Cardiology   Glenn Snyder 76 y o  male MRN: [id number]  Unit/Bed#: E4 -01 Encounter: [id number]        Reason For Consult: Elevated troponin               ASSESSMENT:  · Elevated troponin with precordial chest discomfort    - probable non-ST elevation myocardial in light of symptomatology, elevated troponin, ECG changes  - with HS troponin trend of 102, 199, 425    - TTE 3/20/20: LVEF 67%, grade 1 diastolic dysfunction, mild MAC, aortic valve with mildly increased thickness with normal cuspal separation and sclerosis, trace AR  · Hypertension   - outpatient Rx, amlodipine 10 mg daily, lisinopril 40 mg daily, Toprol-XL 50 mg daily  · History of CVA, March 2020  · Prediabetes  · Questionable medication compliance    PLAN/ DISCUSSION:     · Currently on heparin infusion per [facility] protocol  · Status post aspirin 324 mg x 1 with aspirin 81 mg daily thereafter  · We will resume Plavix 75 mg daily, however will provide Plavix 300 mg x 1 today  · Continue high intensity statin with atorvastatin 80 mg daily  · Repeat fasting lipid panel  · Awaiting repeat hemoglobin A1c  · Currently on amlodipine 10 mg daily, lisinopril 40 mg daily, Toprol-XL 50 mg daily  · BP fluctuant, will add hold parameters to antihypertensives  · Echocardiogram ordered to assess cardiac structure and function  · We will plan for [facility] tomorrow pending patient's discussion with the sister  · Monitor volume status closely with strict intake/output, daily weight  · Monitor renal function and electrolytes; maintain potassium > 4, magnesium > 2  History Of Present Illness:  78-year-old male presented to [facility] with complaints of sudden onset chest discomfort, diaphoresis, nausea, shortness of breath  Per patient, he was lifting a heavy rug onto his close line after water had flowed onto the rug due to an overflowing sink   When he was lifting the run, he developed sudden onset chest discomfort described as a pressure with radiation down his left upper extremity to his hands with associated diaphoresis, nausea, shortness of breath  He states that the discomfort continued until he presented to the emergency department  He states that he has not experienced these symptoms in the past     Upon assessment and evaluation, patient is resting comfortably in bed  At this time, patient denies chest pain, dizziness, lightheadedness, syncope, presyncope, shortness of breath  Discussed with the patient the plan for left heart catheterization, he would like to discuss this further with his sister and possibly plan for the procedure tomorrow  Please see significant cardiac history and problems enumerated above  Upon review, patient does not follow with an outpatient cardiologist  Patient follows with his PCP at the [facility]  Past Medical History:        Past Medical History:   Diagnosis Date   • Hypertension    • Stroke [facility])     2017      Past Surgical History:   Procedure Laterality Date   • EYE SURGERY          Allergy:        No Known Allergies    Medications:       Prior to Admission medications    Medication Sig Start Date End Date Taking? Authorizing Provider   amLODIPine (NORVASC) 10 mg tablet Take 1 tablet by mouth daily 4/6/23  Yes Historical Provider, MD   aspirin 81 mg chewable tablet Chew 81 mg daily 4/27/17  Yes Historical Provider, MD   atorvastatin (LIPITOR) 80 mg tablet Take 1 tablet (80 mg total) by mouth daily with dinner 4/13/20  Yes Ariana Coley MD   clopidogrel (PLAVIX) 75 mg tablet Take 75 mg by mouth daily 7/25/19  Yes Historical Provider, MD   lisinopril (ZESTRIL) 40 mg tablet Take 40 mg by mouth daily 7/25/19  Yes Historical Provider, MD   metoprolol succinate (TOPROL-XL) 50 mg 24 hr tablet Take 1 tablet (50 mg total) by mouth daily 4/14/20  Yes Ariana Coley MD       Family History:     History reviewed  No pertinent family history       Social History:       Social History Socioeconomic History   • Marital status: Single     Spouse name: None   • Number of children: None   • Years of education: None   • Highest education level: None   Occupational History   • None   Tobacco Use   • Smoking status: Former     Packs/day: 1 00     Types: Cigarettes     Start date: 1969     Quit date: 2005     Years since quittin 4   • Smokeless tobacco: Never   Vaping Use   • Vaping Use: Never used   Substance and Sexual Activity   • Alcohol use: Never   • Drug use: Never   • Sexual activity: Not Currently   Other Topics Concern   • None   Social History Narrative   • None     Social Determinants of Health     Financial Resource Strain: Not on file   Food Insecurity: Not on file   Transportation Needs: Not on file   Physical Activity: Not on file   Stress: Not on file   Social Connections: Not on file   Intimate Partner Violence: Not on file   Housing Stability: Not on file       ROS:  Negative except otherwise aforementioned above  Remainder review of systems is negative    Exam:  General:  alert, oriented and in no distress, cooperative  Head: Normocephalic, atraumatic  Eyes:  EOMI  Pupils - equal, round, reactive to accomodation  No icterus  Normal Conjunctiva  Oropharynx: moist and normal-appearing mucosa  Neck: supple, symmetrical, trachea midline   Heart: regular rate with controlled rhythm   Respiratory effort / Chest Inspection: unlabored  Lungs:  normal air entry, lungs clear to auscultation and no rales, rhonchi or wheezing   Abdomen: flat, normal findings: bowel sounds normal and soft, non-tender  Lower Limbs:  no pitting edema  Musculoskeletal: ROM grossly normal      DATA:      ECG:                       Telemetry: SR on telemetry           Echocardiogram completed on 3/20/20:         LEFT VENTRICLE: Size was normal  Systolic function was normal  Ejection fraction was estimated to be 70 %  There were no regional wall motion abnormalities   Wall thickness was moderately increased  There was moderate concentric  hypertrophy  DOPPLER: Doppler parameters were consistent with abnormal left ventricular relaxation (grade 1 diastolic dysfunction)      RIGHT VENTRICLE: The size was normal  Systolic function was normal  Wall thickness was normal      LEFT ATRIUM: Size was normal      RIGHT ATRIUM: Size was normal      MITRAL VALVE: There was mild annular calcification  DOPPLER: There was no evidence for stenosis  There was no regurgitation      AORTIC VALVE: The valve was trileaflet  Leaflets exhibited mildly increased thickness, normal cuspal separation, and sclerosis  DOPPLER: Transaortic velocity was within the normal range  There was no evidence for stenosis  There was trace  regurgitation      TRICUSPID VALVE: The valve structure was normal  There was normal leaflet separation  DOPPLER: The transtricuspid velocity was within the normal range  There was no evidence for stenosis  There was no regurgitation      PULMONIC VALVE: Leaflets exhibited normal thickness, no calcification, and normal cuspal separation  DOPPLER: The transpulmonic velocity was within the normal range  There was no regurgitation      PERICARDIUM: There was no pericardial effusion  The pericardium was normal in appearance      AORTA: The root exhibited normal size and mild fibrocalcific change      SYSTEMIC VEINS: IVC: The inferior vena cava was normal in size and course  Respirophasic changes were normal      PULMONARY ARTERY: DOPPLER: The tricuspid jet envelope definition was inadequate for estimation of RV systolic pressure  There are no indirect findings (abnormal RV volume or geometry, altered pulmonary flow velocity profile, or leftward  septal displacement) which would suggest moderate or severe pulmonary hypertension  Weights:     Wt Readings from Last 3 Encounters:   06/19/23 72 1 kg (158 lb 15 2 oz)   04/14/20 67 5 kg (148 lb 13 oz)   03/20/20 70 4 kg (155 lb 3 3 oz)   , Body mass index is 22 81 kg/m²           Lab Studies:             Results from last 7 days   Lab Units 06/19/23  0012   WBC Thousand/uL 10 19*   HEMOGLOBIN g/dL 12 9   HEMATOCRIT % 39 7   PLATELETS Thousands/uL 237   ,   Results from last 7 days   Lab Units 06/19/23  0012   POTASSIUM mmol/L 3 7   CHLORIDE mmol/L 107   CO2 mmol/L 24   BUN mg/dL 14   CREATININE mg/dL 0 96   CALCIUM mg/dL 8 9   ALK PHOS U/L 56   ALT U/L 10   AST U/L 14

## 2023-06-19 NOTE — PLAN OF CARE
Problem: Potential for Falls  Goal: Patient will remain free of falls  Description: INTERVENTIONS:  - Educate patient/family on patient safety including physical limitations  - Instruct patient to call for assistance with activity   - Consult OT/PT to assist with strengthening/mobility   - Keep Call bell within reach  - Keep bed low and locked with side rails adjusted as appropriate  - Keep care items and personal belongings within reach  - Initiate and maintain comfort rounds  - Make Fall Risk Sign visible to staff  - Offer Toileting every 2 Hours, in advance of need  - Initiate/Maintain bed alarm  - Obtain necessary fall risk management equipment: bed alarm  - Apply yellow socks and bracelet for high fall risk patients  - Consider moving patient to room near nurses station  Outcome: Progressing     Problem: PAIN - ADULT  Goal: Verbalizes/displays adequate comfort level or baseline comfort level  Description: Interventions:  - Encourage patient to monitor pain and request assistance  - Assess pain using appropriate pain scale  - Administer analgesics based on type and severity of pain and evaluate response  - Implement non-pharmacological measures as appropriate and evaluate response  - Consider cultural and social influences on pain and pain management  - Notify physician/advanced practitioner if interventions unsuccessful or patient reports new pain  Outcome: Progressing     Problem: INFECTION - ADULT  Goal: Absence or prevention of progression during hospitalization  Description: INTERVENTIONS:  - Assess and monitor for signs and symptoms of infection  - Monitor lab/diagnostic results  - Monitor all insertion sites, i e  indwelling lines, tubes, and drains  - [place] appropriate cooling/warming therapies per order  - Administer medications as ordered  - Instruct and encourage patient and family to use good hand hygiene technique    Outcome: Progressing     Problem: DISCHARGE PLANNING  Goal: Discharge to home or other facility with appropriate resources  Description: INTERVENTIONS:  - Identify barriers to discharge w/patient and caregiver  - Arrange for needed discharge resources and transportation as appropriate  - Identify discharge learning needs (meds, wound care, etc )  - Refer to Case Management Department for coordinating discharge planning if the patient needs post-hospital services based on physician/advanced practitioner order or complex needs related to functional status, cognitive ability, or social support system  Outcome: Progressing     Problem: Knowledge Deficit  Goal: Patient/family/caregiver demonstrates understanding of disease process, treatment plan, medications, and discharge instructions  Description: Complete learning assessment and assess knowledge base    Interventions:  - Provide teaching at level of understanding  - Provide teaching via preferred learning methods  Outcome: Progressing     Problem: CARDIOVASCULAR - ADULT  Goal: Maintains optimal cardiac output and hemodynamic stability  Description: INTERVENTIONS:  - Monitor I/O, vital signs and rhythm  - Monitor for S/S and trends of decreased cardiac output  - Administer and titrate ordered vasoactive medications to optimize hemodynamic stability  - Assess quality of pulses, skin color and temperature  - Assess for signs of decreased coronary artery perfusion  - Instruct patient to report change in severity of symptoms  Outcome: Progressing  Goal: Absence of cardiac dysrhythmias or at baseline rhythm  Description: INTERVENTIONS:  - Continuous cardiac monitoring, vital signs, obtain 12 lead EKG if ordered  - Administer antiarrhythmic and heart rate control medications as ordered  - Monitor electrolytes and administer replacement therapy as ordered  Outcome: Progressing     Problem: GASTROINTESTINAL - ADULT  Goal: Minimal or absence of nausea and/or vomiting  Description: INTERVENTIONS:  - Administer IV fluids if ordered to ensure adequate hydration  - Maintain NPO status until nausea and vomiting are resolved  - Nasogastric tube if ordered  - Administer ordered antiemetic medications as needed  - Provide nonpharmacologic comfort measures as appropriate  - Advance diet as tolerated, if ordered  - Consider nutrition services referral to assist patient with adequate nutrition and appropriate food choices  Outcome: Progressing  Goal: Maintains adequate nutritional intake  Description: INTERVENTIONS:  - Monitor percentage of each meal consumed  - Identify factors contributing to decreased intake, treat as appropriate  - Assist with meals as needed  - Monitor I&O, weight, and lab values if indicated  - Obtain nutrition services referral as needed  Outcome: Progressing

## 2023-06-19 NOTE — ED PROVIDER NOTES
"History  Chief Complaint   Patient presents with   • Chest Pain     Chest pain and sob starting around 1030pm  States radiates into left arm  Per family, has not taken medication in 4 days  76year-old male presents with complaint of stabbing chest pain that began about 2 hours ago  He admits to left arm numbness, but it is unclear if this is new or a residual effect from a previous stroke  Patient explained the pain began after he flooded the kitchen sink and was attempting to move the wet carpets  Sisters present at bedside and admit the patient was sweating during the event  His sisters claim he has not taken his medication for at least 4 days due to \"being stubborn\"  Patient dry heaving during exam, no prior episodes of vomiting  Denies events like this in the past           History provided by:  Patient and relative  Chest Pain  Pain location:  L chest  Pain quality: stabbing    Pain radiates to:  L shoulder and L arm  Onset quality:  Sudden  Duration:  2 hours  Progression:  Improving  Associated symptoms: diaphoresis, nausea, shortness of breath and vomiting    Associated symptoms: no abdominal pain, no back pain, no claudication, no cough, no dizziness, no fever and no lower extremity edema        Prior to Admission Medications   Prescriptions Last Dose Informant Patient Reported? Taking?    amLODIPine (NORVASC) 10 mg tablet   Yes Yes   Sig: Take 1 tablet by mouth daily   aspirin 81 mg chewable tablet   Yes Yes   Sig: Chew 81 mg daily   atorvastatin (LIPITOR) 80 mg tablet   No Yes   Sig: Take 1 tablet (80 mg total) by mouth daily with dinner   clopidogrel (PLAVIX) 75 mg tablet   Yes Yes   Sig: Take 75 mg by mouth daily   lisinopril (ZESTRIL) 40 mg tablet   Yes Yes   Sig: Take 40 mg by mouth daily   metoprolol succinate (TOPROL-XL) 50 mg 24 hr tablet   No Yes   Sig: Take 1 tablet (50 mg total) by mouth daily      Facility-Administered Medications: None       Past Medical History:   Diagnosis Date   • " Hypertension    • Stroke [facility]     2017       Past Surgical History:   Procedure Laterality Date   • EYE SURGERY         History reviewed  No pertinent family history  I have reviewed and agree with the history as documented  E-Cigarette/Vaping   • E-Cigarette Use Never User      E-Cigarette/Vaping Substances   • Nicotine No    • THC No    • CBD No    • Flavoring No    • Other No    • Unknown No      Social History     Tobacco Use   • Smoking status: Former     Packs/day: 1 00     Types: Cigarettes     Start date: 1969     Quit date: 2005     Years since quittin 4   • Smokeless tobacco: Never   Vaping Use   • Vaping Use: Never used   Substance Use Topics   • Alcohol use: Never   • Drug use: Never       Review of Systems   Constitutional: Positive for diaphoresis  Negative for chills and fever  Eyes: Negative for visual disturbance  Respiratory: Positive for chest tightness and shortness of breath  Negative for cough  Cardiovascular: Positive for chest pain  Negative for claudication and leg swelling  Gastrointestinal: Positive for nausea and vomiting  Negative for abdominal pain  Musculoskeletal: Negative for back pain and neck pain  Skin: Negative for color change, pallor, rash and wound  Allergic/Immunologic: Negative for immunocompromised state  Neurological: Negative for dizziness, syncope and light-headedness  All other systems reviewed and are negative  Physical Exam  Physical Exam  Vitals and nursing note reviewed  Constitutional:       General: He is not in acute distress  Appearance: He is well-developed  He is ill-appearing  He is not toxic-appearing or diaphoretic  HENT:      Head: Normocephalic and atraumatic  Right Ear: External ear normal       Left Ear: External ear normal       Nose: No congestion  Eyes:      General: No scleral icterus  Conjunctiva/sclera: Conjunctivae normal       Right eye: Right conjunctiva is not injected        Left eye: Left conjunctiva is not injected  Neck:      Trachea: No tracheal deviation  Cardiovascular:      Rate and Rhythm: Normal rate and regular rhythm  Pulses:           Radial pulses are 2+ on the right side and 2+ on the left side  Dorsalis pedis pulses are 1+ on the right side and 1+ on the left side  Heart sounds: Murmur heard  Pulmonary:      Effort: Pulmonary effort is normal  No respiratory distress  Breath sounds: Normal breath sounds  No stridor  Chest:      Chest wall: No tenderness, crepitus or edema  Musculoskeletal:      Right lower leg: No tenderness  No edema  Left lower leg: No tenderness  No edema  Skin:     General: Skin is warm and dry  Coloration: Skin is not pale  Findings: No erythema or rash  Neurological:      Mental Status: He is alert  Motor: No abnormal muscle tone     Psychiatric:         Mood and Affect: Mood normal          Behavior: Behavior normal          Vital Signs  ED Triage Vitals   Temperature Pulse Respirations Blood Pressure SpO2   06/19/23 0007 06/19/23 0002 06/19/23 0002 06/19/23 0002 06/19/23 0002   97 6 °F (36 4 °C) 76 18 119/68 96 %      Temp Source Heart Rate Source Patient Position - Orthostatic VS BP Location FiO2 (%)   06/19/23 0007 06/19/23 0002 06/19/23 0002 06/19/23 0002 --   Oral Monitor Sitting Right arm       Pain Score       --                  Vitals:    06/19/23 0002 06/19/23 0200   BP: 119/68 138/92   Pulse: 76 87   Patient Position - Orthostatic VS: Sitting Lying         Visual Acuity      ED Medications  Medications   heparin (porcine) 25,000 units in 0 45% NaCl 250 mL infusion (premix) (12 Units/kg/hr × 70 kg (Order-Specific) Intravenous New Bag 6/19/23 0215)   heparin (porcine) injection 4,000 Units (has no administration in time range)   heparin (porcine) injection 2,000 Units (has no administration in time range)   ondansetron (ZOFRAN) injection 4 mg (4 mg Intravenous Given 6/19/23 0120)   heparin (porcine) injection 4,000 Units (4,000 Units Intravenous Given 6/19/23 0210)       Diagnostic Studies  Results Reviewed     Procedure Component Value Units Date/Time    HS Troponin I 2hr [[id number]] Collected: 06/19/23 0209    Lab Status:  In process Specimen: Blood from Arm, Left Updated: 06/19/23 0217    HS Troponin I 4hr [[id number]]     Lab Status: No result Specimen: Blood     Protime-INR [[id number]]  (Normal) Collected: 06/19/23 0012    Lab Status: Final result Specimen: Blood from Arm, Left Updated: 06/19/23 0141     Protime 13 1 seconds      INR 0 99    APTT [[id number]]  (Normal) Collected: 06/19/23 0012    Lab Status: Final result Specimen: Blood from Arm, Left Updated: 06/19/23 0141     PTT 28 seconds     B-Type Natriuretic Peptide(BNP) [[id number]]  (Abnormal) Collected: 06/19/23 0012    Lab Status: Final result Specimen: Blood from Arm, Left Updated: 06/19/23 0052      pg/mL     HS Troponin 0hr (reflex protocol) [[id number]]  (Abnormal) Collected: 06/19/23 0012    Lab Status: Final result Specimen: Blood from Arm, Left Updated: 06/19/23 0052     hs TnI 0hr 102 ng/L     Comprehensive metabolic panel [[id number]]  (Abnormal) Collected: 06/19/23 0012    Lab Status: Final result Specimen: Blood from Arm, Left Updated: 06/19/23 0047     Sodium 143 mmol/L      Potassium 3 7 mmol/L      Chloride 107 mmol/L      CO2 24 mmol/L      ANION GAP 12 mmol/L      BUN 14 mg/dL      Creatinine 0 96 mg/dL      Glucose 151 mg/dL      Calcium 8 9 mg/dL      AST 14 U/L      ALT 10 U/L      Alkaline Phosphatase 56 U/L      Total Protein 7 1 g/dL      Albumin 4 3 g/dL      Total Bilirubin 0 39 mg/dL      eGFR 74 ml/min/1 73sq m     Narrative:      Jessika guidelines for Chronic Kidney Disease (CKD):   •  Stage 1 with normal or high GFR (GFR > 90 mL/min/1 73 square meters)  •  Stage 2 Mild CKD (GFR = 60-89 mL/min/1 73 square meters)  •  Stage 3A Moderate CKD (GFR = 45-59 mL/min/1 73 square meters)  • Stage 3B Moderate CKD (GFR = 30-44 mL/min/1 73 square meters)  •  Stage 4 Severe CKD (GFR = 15-29 mL/min/1 73 square meters)  •  Stage 5 End Stage CKD (GFR <15 mL/min/1 73 square meters)  Note: GFR calculation is accurate only with a steady state creatinine    Magnesium [[id number]]  (Normal) Collected: 06/19/23 0012    Lab Status: Final result Specimen: Blood from Arm, Left Updated: 06/19/23 0047     Magnesium 2 1 mg/dL     CBC and differential [[id number]]  (Abnormal) Collected: 06/19/23 0012    Lab Status: Final result Specimen: Blood from Arm, Left Updated: 06/19/23 0025     WBC 10 19 Thousand/uL      RBC 4 38 Million/uL      Hemoglobin 12 9 g/dL      Hematocrit 39 7 %      MCV 91 fL      MCH 29 5 pg      MCHC 32 5 g/dL      RDW 13 3 %      MPV 10 4 fL      Platelets 874 Thousands/uL      nRBC 0 /100 WBCs      Neutrophils Relative 62 %      Immat GRANS % 1 %      Lymphocytes Relative 27 %      Monocytes Relative 7 %      Eosinophils Relative 2 %      Basophils Relative 1 %      Neutrophils Absolute 6 46 Thousands/µL      Immature Grans Absolute 0 05 Thousand/uL      Lymphocytes Absolute 2 71 Thousands/µL      Monocytes Absolute 0 74 Thousand/µL      Eosinophils Absolute 0 18 Thousand/µL      Basophils Absolute 0 05 Thousands/µL     UA (URINE) with reflex to Scope [[id number]]     Lab Status: No result Specimen: Urine                  XR chest 1 view portable    (Results Pending)              Procedures  ECG 12 Lead Documentation Only    Date/Time: 6/19/2023 2:18 AM    Performed by: Shahid Wills DO  Authorized by: Shahid Wills DO    Indications / Diagnosis:  Chest pain  ECG reviewed by me, the ED Provider: yes    Patient location:  ED  Previous ECG:     Previous ECG:  Compared to current    Similarity:  Changes noted  Interpretation:     Interpretation: abnormal    Rate:     ECG rate assessment: normal    Rhythm:     Rhythm: sinus rhythm    Ectopy:     Ectopy: none    QRS:     QRS intervals: Normal  Conduction:     Conduction: abnormal      Abnormal conduction: complete RBBB    ST segments:     ST segments:  Non-specific    Elevation:  AVR    Depression:  V2, V3 and V4  T waves:     T waves: non-specific    CriticalCare Time    Date/Time: 6/19/2023 2:20 AM    Performed by: Caity Loco DO  Authorized by: Caity Loco DO    Critical care provider statement:     Critical care time (minutes):  45    Critical care time was exclusive of:  Separately billable procedures and treating other patients and teaching time    Critical care was necessary to treat or prevent imminent or life-threatening deterioration of the following conditions:  Cardiac failure    Critical care was time spent personally by me on the following activities:  Blood draw for specimens, obtaining history from patient or surrogate, development of treatment plan with patient or surrogate, discussions with consultants, evaluation of patient's response to treatment, examination of patient, interpretation of cardiac output measurements, ordering and performing treatments and interventions, ordering and review of laboratory studies, ordering and review of radiographic studies, review of old charts and re-evaluation of patient's condition    I assumed direction of critical care for this patient from another provider in my specialty: no               ED Course  ED Course as of 06/19/23 0221   Mon Jun 19, 2023   0043 ECG 12 lead             HEART Risk Score    Flowsheet Row Most Recent Value   Heart Score Risk Calculator    History 2 Filed at: 06/19/2023 0211   ECG 1 Filed at: 06/19/2023 0211   Age 2 Filed at: 06/19/2023 0211   Risk Factors 2 Filed at: 06/19/2023 0211   Troponin 2 Filed at: 06/19/2023 0211   HEART Score 9 Filed at: 06/19/2023 0211                        SBIRT 22yo+    Flowsheet Row Most Recent Value   Initial Alcohol Screen: US AUDIT-C     1   How often do you have a drink containing alcohol? 0 Filed at: "06/19/2023 0009   2  How many drinks containing alcohol do you have on a typical day you are drinking? 0 Filed at: 06/19/2023 0009   3b  FEMALE Any Age, or MALE 65+: How often do you have 4 or more drinks on one occassion? 0 Filed at: 06/19/2023 0009   Audit-C Score 0 Filed at: 06/19/2023 0009   ALBA: How many times in the past year have you    Used an illegal drug or used a prescription medication for non-medical reasons? Never Filed at: 06/19/2023 0009                    Medical Decision Making  Patient presents 2 hours after the onset of sudden, severe, stabbing chest pain that began as he was attempting to move wet carpets after he flooded the kitchen sink  Since the onset, he claims the pain has decreased but is still present  He complains of left arm numbness, yet it is unclear residual from prior CVA in March, 2020  Patient questions many times in different ways with differing answers each time  His two sisters are present at bedside and report he was diaphoretic at the time as well  Sisters also included that the patient has not taken his medications in at least 4 days due to \"being stubborn\"  PMH significant for essential hypertension controlled on 3 medications, and CVA on Asprin and Plavix  Patient began vomiting during exam, with no prior episodes of vomiting tonight  Physical exam reveals uncomfortable patient with stable vitals  Lungs clear to ascultation bilaterally  Heart with murmur  No tenderness or edema in lower extremities  Some abdominal discomfort on palpation  Looks unwell overall  Labs, chest X-ray, and EKG ordered for further evaluation of patients current status  Plan and treatment to be determined once results of tests return  EKG shows a right bundle branch block with nonspecific changes  No criteria for STEMI  Troponin is 102 and BNP is also slightly elevated    Plan is to start on heparin, admit to internal medicine for further observation, treatment and " consultation with cardiology  Patient is chest pain-free at this time  We will repeat EKG sooner than his delta if symptoms return  Amount and/or Complexity of Data Reviewed  Labs: ordered  ECG/medicine tests:  Decision-making details documented in ED Course  Risk  Prescription drug management  Decision regarding hospitalization  Disposition  Final diagnoses:   NSTEMI (non-ST elevated [facility]) [facility]   Chest pain   Elevated troponin     Time reflects when diagnosis was documented in both MDM as applicable and the Disposition within this note     Time User Action Codes Description Comment    6/19/2023  1:06 AM Azeb Sorensen Add [I21 4] NSTEMI (non-ST elevated myocardial infarction) ([facility] )     6/19/2023  1:06 AM Smeriglio, Alline Moulding Add [R07 9] Chest pain     6/19/2023  1:06 AM Smeriglio, Alline Moulding Add [R77 8] Elevated troponin       ED Disposition     ED Disposition   Admit    Condition   Stable    Date/Time   Mon Jun 19, 2023  1:06 AM    Comment   Case was discussed with KARIME and the patient's admission status was agreed to be Admission Status: inpatient status to the service of Dr Omi Mireles   Follow-up Information    None         Patient's Medications   Discharge Prescriptions    No medications on file       No discharge procedures on file      PDMP Review       Value Time User    PDMP Reviewed  Yes 4/13/2020 11:32 AM Flory Bueno MD          ED Provider  Electronically Signed by           Valerie Abad DO  06/19/23 0221

## 2023-06-19 NOTE — H&P
[address] [place] [address]  H&P  Name: Petrona Roberts 76 y o  male I MRN: [id number]  Unit/Bed#: E4 -01 I Date of Admission: 6/18/2023   Date of Service: 6/19/2023 I Hospital Day: 0      Assessment/Plan   * NSTEMI (non-ST elevated myocardial infarction) [facility])  Assessment & Plan  Suspected pt w/hx of cva w/substernal chest pain nausea abd abd pain after lifting a heavy rug onto his clotheslines due to water overflowing from the sink on it   -ekgs w/bifascicular block st depressions in v2-6 troponin 102~>199~>425   -trial morphine/nitro for cp  If no improvement will switch to ntg paste  -continue heparin gtt started in ed will asa load and continue w/asa 81mg  Pt was previously on plavix for his of cva w/baby asa as well last taken 4 days ago  Will plan to resume this pending d/w cardiology given no new stroke like s/sx and possible further cardiac diagnostics  -npo consult cardiology    History of CVA (cerebrovascular accident)  Assessment & Plan  W/residual numbness of left arm/leg  Resume statin, will asa load and then continue w/asa 81mg     Compliance with medication regimen  Assessment & Plan  Apparently stopped taking most of his meds 4 days ago per ed d/w pt's sister    Bifascicular block  Assessment & Plan  Noted on ekg  Cautious use of anti-emetics to avoid qtc prolongation    HTN (hypertension)  Assessment & Plan  Restart toprol, lisinopril/norvasc       VTE Pharmacologic Prophylaxis: VTE Score: 4 High Risk (Score >/= 5) - Pharmacological DVT Prophylaxis Ordered: heparin drip  Sequential Compression Devices Ordered  Code Status: fc  Discussion with family:     Anticipated Length of Stay: Patient will be admitted on an inpatient basis with an anticipated length of stay of greater than 2 midnights secondary to nstemi      Total Time Spent on Date of Encounter in care of patient: 40 minutes This time was spent on one or more of the following: performing physical exam; counseling and coordination of care; obtaining or reviewing history; documenting in the medical record; reviewing/ordering tests, medications or procedures; communicating with other healthcare professionals and discussing with patient's family/caregivers  Chief Complaint: cp abd pain n/v    History of Present Illness:  Alex Moran is a 76 y o  male with a PMH of cva htn bifascicular block who presents with cp  Pt is hard of hearing  hpi constructed by d/w ed provider and w/pt  Pt came in for cp/sob n/v and diaphoresis after lifting a water soaked carpet and putting it over his clothesline  He had a hoagie around dinner ~1900 and was in his normal state of health when he realized one of his sinks was overflowing and had soaked the carpet  After putting the carpet on the line he laid down on the couch and around 2200 had severe cp sob n/v   Pain was persistent radiating into stomach  He was diaphoretic as well  He came to ed for evaluation and was found to have concern for nstemi  He was heparinized and admission was requested       Review of Systems:  Review of Systems   Constitutional: Positive for diaphoresis  Cardiovascular: Positive for chest pain  Gastrointestinal: Positive for abdominal pain, nausea and vomiting  All other systems reviewed and are negative  Past Medical and Surgical History:   Past Medical History:   Diagnosis Date   • Hypertension    • Stroke [facility])     2017       Past Surgical History:   Procedure Laterality Date   • EYE SURGERY         Meds/Allergies:  Prior to Admission medications    Medication Sig Start Date End Date Taking?  Authorizing Provider   amLODIPine (NORVASC) 10 mg tablet Take 1 tablet by mouth daily 4/6/23  Yes Historical Provider, MD   aspirin 81 mg chewable tablet Chew 81 mg daily 4/27/17  Yes Historical Provider, MD   atorvastatin (LIPITOR) 80 mg tablet Take 1 tablet (80 mg total) by mouth daily with dinner 4/13/20  Yes Makenzie Armendariz MD   clopidogrel (PLAVIX) 75 mg "tablet Take 75 mg by mouth daily 19  Yes Historical Provider, MD   lisinopril (ZESTRIL) 40 mg tablet Take 40 mg by mouth daily 19  Yes Historical Provider, MD   metoprolol succinate (TOPROL-XL) 50 mg 24 hr tablet Take 1 tablet (50 mg total) by mouth daily 20  Yes Elliott Waggoner MD         Allergies: No Known Allergies    Social History:  Marital Status: Single   Occupation:   Patient Pre-hospital Living Situation:   Patient Pre-hospital Level of Mobility:   Patient Pre-hospital Diet Restrictions:   Substance Use History:   Social History     Substance and Sexual Activity   Alcohol Use Never     Social History     Tobacco Use   Smoking Status Former   • Packs/day: 1 00   • Types: Cigarettes   • Start date: 1969   • Quit date: 2005   • Years since quittin 4   Smokeless Tobacco Never     Social History     Substance and Sexual Activity   Drug Use Never       Family History:  History reviewed  No pertinent family history  Physical Exam:     Vitals:   Blood Pressure: 93/67 (23 0602)  Pulse: 90 (23 06)  Temperature: 97 8 °F (36 6 °C) (23)  Temp Source: Temporal (23)  Respirations: 18 (23)  Height: 5' 10\" (177 8 cm) (23)  Weight - Scale: 72 1 kg (158 lb 15 2 oz) (23)  SpO2: 94 % (23)    Physical Exam  Vitals reviewed  Constitutional:       General: He is not in acute distress  Appearance: He is ill-appearing  He is not toxic-appearing or diaphoretic  HENT:      Head: Normocephalic and atraumatic  Right Ear: External ear normal       Left Ear: External ear normal       Nose: Nose normal    Eyes:      Extraocular Movements: Extraocular movements intact  Cardiovascular:      Rate and Rhythm: Normal rate and regular rhythm  Heart sounds: No murmur heard  No friction rub  No gallop  Pulmonary:      Effort: Pulmonary effort is normal  No respiratory distress        Breath sounds: Normal breath " sounds  No stridor  No wheezing, rhonchi or rales  Abdominal:      General: There is no distension  Palpations: Abdomen is soft  There is no mass  Tenderness: There is abdominal tenderness  There is no guarding or rebound  Hernia: No hernia is present  Musculoskeletal:      Right lower leg: No edema  Left lower leg: No edema  Skin:     General: Skin is warm and dry  Neurological:      Mental Status: He is alert  Mental status is at baseline  Psychiatric:         Mood and Affect: Mood normal           Additional Data:     Lab Results:  Results from last 7 days   Lab Units 06/19/23  0012   WBC Thousand/uL 10 19*   HEMOGLOBIN g/dL 12 9   HEMATOCRIT % 39 7   PLATELETS Thousands/uL 237   NEUTROS PCT % 62   LYMPHS PCT % 27   MONOS PCT % 7   EOS PCT % 2     Results from last 7 days   Lab Units 06/19/23  0012   SODIUM mmol/L 143   POTASSIUM mmol/L 3 7   CHLORIDE mmol/L 107   CO2 mmol/L 24   BUN mg/dL 14   CREATININE mg/dL 0 96   ANION GAP mmol/L 12   CALCIUM mg/dL 8 9   ALBUMIN g/dL 4 3   TOTAL BILIRUBIN mg/dL 0 39   ALK PHOS U/L 56   ALT U/L 10   AST U/L 14   GLUCOSE RANDOM mg/dL 151*     Results from last 7 days   Lab Units 06/19/23  0012   INR  0 99                   Lines/Drains:  Invasive Devices     Peripheral Intravenous Line  Duration           Peripheral IV 06/19/23 Left Antecubital <1 day    Peripheral IV 06/19/23 Right;Ventral (anterior) Forearm <1 day                    Imaging: Personally reviewed the following imaging: xray(s) and cxr reviewed w/vascular prominence   XR chest 1 view portable    (Results Pending)       EKG and Other Studies Reviewed on Admission:   · EKG: nsr w/bifascicular block w/st depressions in v2-6    ** Please Note: This note has been constructed using a voice recognition system   **

## 2023-06-20 ENCOUNTER — APPOINTMENT (INPATIENT)
Dept: CT IMAGING | Facility: HOSPITAL | Age: 78
DRG: 281 | End: 2023-06-20
Payer: COMMERCIAL

## 2023-06-20 PROBLEM — I25.10 CORONARY ARTERY DISEASE INVOLVING NATIVE CORONARY ARTERY: Status: ACTIVE | Noted: 2023-06-20

## 2023-06-20 PROBLEM — I45.2 BIFASCICULAR BLOCK: Status: RESOLVED | Noted: 2023-06-19 | Resolved: 2023-06-20

## 2023-06-20 LAB
ANION GAP SERPL CALCULATED.3IONS-SCNC: 8 MMOL/L
APTT PPP: 60 SECONDS (ref 23–37)
ATRIAL RATE: 42 BPM
ATRIAL RATE: 44 BPM
BUN SERPL-MCNC: 13 MG/DL (ref 5–25)
CALCIUM SERPL-MCNC: 8.7 MG/DL (ref 8.4–10.2)
CHLORIDE SERPL-SCNC: 107 MMOL/L (ref 96–108)
CHOLEST SERPL-MCNC: 191 MG/DL
CO2 SERPL-SCNC: 25 MMOL/L (ref 21–32)
CREAT SERPL-MCNC: 0.9 MG/DL (ref 0.6–1.3)
GFR SERPL CREATININE-BSD FRML MDRD: 80 ML/MIN/1.73SQ M
GLUCOSE SERPL-MCNC: 121 MG/DL (ref 65–140)
HDLC SERPL-MCNC: 53 MG/DL
LDLC SERPL CALC-MCNC: 110 MG/DL (ref 0–100)
P AXIS: 51 DEGREES
P AXIS: 55 DEGREES
POTASSIUM SERPL-SCNC: 3.8 MMOL/L (ref 3.5–5.3)
PR INTERVAL: 134 MS
PR INTERVAL: 134 MS
QRS AXIS: -50 DEGREES
QRS AXIS: -51 DEGREES
QRSD INTERVAL: 134 MS
QRSD INTERVAL: 134 MS
QT INTERVAL: 564 MS
QT INTERVAL: 608 MS
QTC INTERVAL: 482 MS
QTC INTERVAL: 507 MS
SODIUM SERPL-SCNC: 140 MMOL/L (ref 135–147)
T WAVE AXIS: -45 DEGREES
T WAVE AXIS: -48 DEGREES
TRIGL SERPL-MCNC: 142 MG/DL
TSH SERPL DL<=0.05 MIU/L-ACNC: 1.6 UIU/ML (ref 0.45–4.5)
VENTRICULAR RATE: 42 BPM
VENTRICULAR RATE: 44 BPM

## 2023-06-20 PROCEDURE — 93005 ELECTROCARDIOGRAM TRACING: CPT

## 2023-06-20 PROCEDURE — C1769 GUIDE WIRE: HCPCS | Performed by: INTERNAL MEDICINE

## 2023-06-20 PROCEDURE — 99153 MOD SED SAME PHYS/QHP EA: CPT | Performed by: INTERNAL MEDICINE

## 2023-06-20 PROCEDURE — 71250 CT THORAX DX C-: CPT

## 2023-06-20 PROCEDURE — G1004 CDSM NDSC: HCPCS

## 2023-06-20 PROCEDURE — 99152 MOD SED SAME PHYS/QHP 5/>YRS: CPT | Performed by: INTERNAL MEDICINE

## 2023-06-20 PROCEDURE — 84443 ASSAY THYROID STIM HORMONE: CPT | Performed by: INTERNAL MEDICINE

## 2023-06-20 PROCEDURE — 4A023N7 MEASUREMENT OF CARDIAC SAMPLING AND PRESSURE, LEFT HEART, PERCUTANEOUS APPROACH: ICD-10-PCS | Performed by: INTERNAL MEDICINE

## 2023-06-20 PROCEDURE — B211YZZ FLUOROSCOPY OF MULTIPLE CORONARY ARTERIES USING OTHER CONTRAST: ICD-10-PCS | Performed by: INTERNAL MEDICINE

## 2023-06-20 PROCEDURE — C1894 INTRO/SHEATH, NON-LASER: HCPCS | Performed by: INTERNAL MEDICINE

## 2023-06-20 PROCEDURE — 99233 SBSQ HOSP IP/OBS HIGH 50: CPT | Performed by: INTERNAL MEDICINE

## 2023-06-20 PROCEDURE — 85730 THROMBOPLASTIN TIME PARTIAL: CPT | Performed by: INTERNAL MEDICINE

## 2023-06-20 PROCEDURE — 80048 BASIC METABOLIC PNL TOTAL CA: CPT | Performed by: INTERNAL MEDICINE

## 2023-06-20 PROCEDURE — 93458 L HRT ARTERY/VENTRICLE ANGIO: CPT | Performed by: INTERNAL MEDICINE

## 2023-06-20 PROCEDURE — 80061 LIPID PANEL: CPT | Performed by: NURSE PRACTITIONER

## 2023-06-20 PROCEDURE — 93010 ELECTROCARDIOGRAM REPORT: CPT | Performed by: INTERNAL MEDICINE

## 2023-06-20 RX ORDER — MIDAZOLAM HYDROCHLORIDE 2 MG/2ML
INJECTION, SOLUTION INTRAMUSCULAR; INTRAVENOUS CODE/TRAUMA/SEDATION MEDICATION
Status: DISCONTINUED | OUTPATIENT
Start: 2023-06-20 | End: 2023-06-20 | Stop reason: HOSPADM

## 2023-06-20 RX ORDER — SODIUM CHLORIDE 9 MG/ML
50 INJECTION, SOLUTION INTRAVENOUS CONTINUOUS
Status: DISCONTINUED | OUTPATIENT
Start: 2023-06-20 | End: 2023-06-20

## 2023-06-20 RX ORDER — HEPARIN SODIUM 10000 [USP'U]/100ML
3-20 INJECTION, SOLUTION INTRAVENOUS
Status: ACTIVE | OUTPATIENT
Start: 2023-06-20 | End: 2023-06-21

## 2023-06-20 RX ORDER — SODIUM CHLORIDE 9 MG/ML
100 INJECTION, SOLUTION INTRAVENOUS CONTINUOUS
Status: DISPENSED | OUTPATIENT
Start: 2023-06-20 | End: 2023-06-20

## 2023-06-20 RX ORDER — VERAPAMIL HYDROCHLORIDE 2.5 MG/ML
INJECTION, SOLUTION INTRAVENOUS CODE/TRAUMA/SEDATION MEDICATION
Status: DISCONTINUED | OUTPATIENT
Start: 2023-06-20 | End: 2023-06-20 | Stop reason: HOSPADM

## 2023-06-20 RX ORDER — METOPROLOL SUCCINATE 25 MG/1
25 TABLET, EXTENDED RELEASE ORAL DAILY
Status: DISCONTINUED | OUTPATIENT
Start: 2023-06-21 | End: 2023-06-22 | Stop reason: HOSPADM

## 2023-06-20 RX ORDER — LIDOCAINE HYDROCHLORIDE 10 MG/ML
INJECTION, SOLUTION EPIDURAL; INFILTRATION; INTRACAUDAL; PERINEURAL CODE/TRAUMA/SEDATION MEDICATION
Status: DISCONTINUED | OUTPATIENT
Start: 2023-06-20 | End: 2023-06-20 | Stop reason: HOSPADM

## 2023-06-20 RX ORDER — NITROGLYCERIN 20 MG/100ML
INJECTION INTRAVENOUS CODE/TRAUMA/SEDATION MEDICATION
Status: DISCONTINUED | OUTPATIENT
Start: 2023-06-20 | End: 2023-06-20 | Stop reason: HOSPADM

## 2023-06-20 RX ORDER — FENTANYL CITRATE 50 UG/ML
INJECTION, SOLUTION INTRAMUSCULAR; INTRAVENOUS CODE/TRAUMA/SEDATION MEDICATION
Status: DISCONTINUED | OUTPATIENT
Start: 2023-06-20 | End: 2023-06-20 | Stop reason: HOSPADM

## 2023-06-20 RX ADMIN — LISINOPRIL 40 MG: 20 TABLET ORAL at 09:09

## 2023-06-20 RX ADMIN — HEPARIN SODIUM 12 UNITS/KG/HR: 10000 INJECTION, SOLUTION INTRAVENOUS at 09:11

## 2023-06-20 RX ADMIN — ATORVASTATIN CALCIUM 80 MG: 80 TABLET, FILM COATED ORAL at 16:14

## 2023-06-20 RX ADMIN — METOPROLOL SUCCINATE 50 MG: 50 TABLET, EXTENDED RELEASE ORAL at 09:09

## 2023-06-20 RX ADMIN — SODIUM CHLORIDE 50 ML/HR: 0.9 INJECTION, SOLUTION INTRAVENOUS at 09:13

## 2023-06-20 RX ADMIN — CLOPIDOGREL BISULFATE 75 MG: 75 TABLET ORAL at 09:09

## 2023-06-20 RX ADMIN — AMLODIPINE BESYLATE 10 MG: 10 TABLET ORAL at 09:08

## 2023-06-20 RX ADMIN — ASPIRIN 81 MG 81 MG: 81 TABLET ORAL at 09:08

## 2023-06-20 NOTE — ASSESSMENT & PLAN NOTE
Evidence of multivessel coronary disease  Will need outpatient follow-up discuss coronary bypass graft and cardiac rehabilitation  Continue heparin for additional 48 hours and then discharged home  Did develop bradycardia secondary to metoprolol use and will reduce Lopressor

## 2023-06-20 NOTE — ASSESSMENT & PLAN NOTE
Apparently stopped taking most of his meds 4 days ago per ed d/w pt's sister  Recommend medication compliance

## 2023-06-20 NOTE — DISCHARGE INSTR - AVS FIRST PAGE
Dear Willow Irwin,     [facility] was [facility]  It is our hope that we were always able to exceed the expected standards for your care during your stay  You were hospitalized due to chest pain and shortness of breath concerning for myocardial ischemia  You were cared for on the fourth floor by Faye Benavides DO with the [facility] who covers for your primary care physician (PCP), Bre De La Vega MD, while you were hospitalized  If you have any questions or concerns related to this hospitalization, you may contact us at [phone]  For follow up as well as any medication refills, we recommend that you follow up with your primary care physician  A registered nurse will reach out to you by phone within a few days after your discharge to answer any additional questions that you may have after going home  However, at this time we provide for you here, the most important instructions / recommendations at discharge:     Notable Medication Adjustments -   See please discharge list  Testing Required after Discharge -   Outpatient referral to cardiovascular surgery  Important follow up information -   See above  Other Instructions -     1  Please see the post cardiac catheterization dishcarge instructions  No heavy lifting, greater than 10 lbs  or strenuous  activity for 48 hrs  2 Remove band aid tomorrow  Shower and wash area- groin /wrist gently with soap and water- beginning tomorrow  Rinse and pat dry  Apply new water seal band aid  Repeat this process for 5 days  No powders, creams lotions or antibiotic ointments  for 5 days  No tub baths, hot tubs or swimming for 5 days  3  Please call our office ([phone]) if you have any fever, redness, swelling, discharge from your groin/wrist access site  4 No driving for 1 day    5   Angioseal Booklet   Please review this entire after visit summary as additional general instructions including medication list, appointments, activity, diet, any pertinent wound care, and other additional recommendations from your care team that may be provided for you        Sincerely,     Lazaro Taylor DO and Nurse Santo Vázquez

## 2023-06-20 NOTE — PLAN OF CARE
Problem: Potential for Falls  Goal: Patient will remain free of falls  Description: INTERVENTIONS:  - Educate patient/family on patient safety including physical limitations  - Instruct patient to call for assistance with activity   - Consult OT/PT to assist with strengthening/mobility   - Keep Call bell within reach  - Keep bed low and locked with side rails adjusted as appropriate  - Keep care items and personal belongings within reach  - Initiate and maintain comfort rounds  - Make Fall Risk Sign visible to staff  - Offer Toileting every 2 Hours, in advance of need  - Initiate/Maintain bed alarm  - Obtain necessary fall risk management equipment: bed alarm  - Apply yellow socks and bracelet for high fall risk patients  - Consider moving patient to room near nurses station  Outcome: Progressing     Problem: PAIN - ADULT  Goal: Verbalizes/displays adequate comfort level or baseline comfort level  Description: Interventions:  - Encourage patient to monitor pain and request assistance  - Assess pain using appropriate pain scale  - Administer analgesics based on type and severity of pain and evaluate response  - Implement non-pharmacological measures as appropriate and evaluate response  - Consider cultural and social influences on pain and pain management  - Notify physician/advanced practitioner if interventions unsuccessful or patient reports new pain  Outcome: Progressing     Problem: INFECTION - ADULT  Goal: Absence or prevention of progression during hospitalization  Description: INTERVENTIONS:  - Assess and monitor for signs and symptoms of infection  - Monitor lab/diagnostic results  - Monitor all insertion sites, i e  indwelling lines, tubes, and drains  - [place] appropriate cooling/warming therapies per order  - Administer medications as ordered  - Instruct and encourage patient and family to use good hand hygiene technique    Outcome: Progressing     Problem: DISCHARGE PLANNING  Goal: Discharge to home or other facility with appropriate resources  Description: INTERVENTIONS:  - Identify barriers to discharge w/patient and caregiver  - Arrange for needed discharge resources and transportation as appropriate  - Identify discharge learning needs (meds, wound care, etc )  - Refer to Case Management Department for coordinating discharge planning if the patient needs post-hospital services based on physician/advanced practitioner order or complex needs related to functional status, cognitive ability, or social support system  Outcome: Progressing     Problem: Knowledge Deficit  Goal: Patient/family/caregiver demonstrates understanding of disease process, treatment plan, medications, and discharge instructions  Description: Complete learning assessment and assess knowledge base    Interventions:  - Provide teaching at level of understanding  - Provide teaching via preferred learning methods  Outcome: Progressing     Problem: CARDIOVASCULAR - ADULT  Goal: Maintains optimal cardiac output and hemodynamic stability  Description: INTERVENTIONS:  - Monitor I/O, vital signs and rhythm  - Monitor for S/S and trends of decreased cardiac output  - Administer and titrate ordered vasoactive medications to optimize hemodynamic stability  - Assess quality of pulses, skin color and temperature  - Assess for signs of decreased coronary artery perfusion  - Instruct patient to report change in severity of symptoms  Outcome: Progressing  Goal: Absence of cardiac dysrhythmias or at baseline rhythm  Description: INTERVENTIONS:  - Continuous cardiac monitoring, vital signs, obtain 12 lead EKG if ordered  - Administer antiarrhythmic and heart rate control medications as ordered  - Monitor electrolytes and administer replacement therapy as ordered  Outcome: Progressing     Problem: GASTROINTESTINAL - ADULT  Goal: Minimal or absence of nausea and/or vomiting  Description: INTERVENTIONS:  - Administer IV fluids if ordered to ensure adequate hydration  - Maintain NPO status until nausea and vomiting are resolved  - Nasogastric tube if ordered  - Administer ordered antiemetic medications as needed  - Provide nonpharmacologic comfort measures as appropriate  - Advance diet as tolerated, if ordered  - Consider nutrition services referral to assist patient with adequate nutrition and appropriate food choices  Outcome: Progressing  Goal: Maintains adequate nutritional intake  Description: INTERVENTIONS:  - Monitor percentage of each meal consumed  - Identify factors contributing to decreased intake, treat as appropriate  - Assist with meals as needed  - Monitor I&O, weight, and lab values if indicated  - Obtain nutrition services referral as needed  Outcome: Progressing

## 2023-06-20 NOTE — NURSING NOTE
Per Dr Orozco Pat - hold heparin drip until 6 hours post removal of TR band  TR band removed at 1400  Heparin to be restarted at 2000

## 2023-06-20 NOTE — ASSESSMENT & PLAN NOTE
Patient with evidence of coronary disease involving multiple vessels    3V-CAD: severe lesions in the oLAD, oDIA,  oLCX, pOM, and the pRCA is a subtotal occlusion with robust collaterals from the left  •  LVEDP is normal without gradient on LV-AO pullback  •  Severe right Subclavian Artery stenosis noted as well

## 2023-06-20 NOTE — PROGRESS NOTES
[address] [place] [address]  Progress Note  Name: Lolly Mccrary  MRN: [id number]  Unit/Bed#: E4 -01 I Date of Admission: 6/18/2023   Date of Service: 6/20/2023 I Hospital Day: 1    Assessment/Plan   * NSTEMI (non-ST elevated myocardial infarction) [facility])  Assessment & Plan  Evidence of multivessel coronary disease  Will need outpatient follow-up discuss coronary bypass graft and cardiac rehabilitation  Continue heparin for additional 48 hours and then discharged home  Did develop bradycardia secondary to metoprolol use and will reduce Lopressor      Coronary artery disease involving native coronary artery  Assessment & Plan  Patient with evidence of coronary disease involving multiple vessels    3V-CAD: severe lesions in the oLAD, oDIA,  oLCX, pOM, and the pRCA is a subtotal occlusion with robust collaterals from the left  •  LVEDP is normal without gradient on LV-AO pullback  •  Severe right Subclavian Artery stenosis noted as well         History of CVA (cerebrovascular accident)  Assessment & Plan  W/residual numbness of left arm/leg  Resume statin, will asa load and then continue w/asa 81mg     Compliance with medication regimen  Assessment & Plan  Apparently stopped taking most of his meds 4 days ago per ed d/w pt's sister  Recommend medication compliance    HTN (hypertension)  Assessment & Plan  Restart toprol, lisinopril/norvasc    Bifascicular block-resolved as of 6/20/2023  Assessment & Plan  Noted on ekg    Cautious use of anti-emetics to avoid qtc prolongation           [facility] Progress Note  Patient: Nisa Pantoja 76 y o  male   MRN: [id number]  PCP: Rm Broussard MD  Unit/Bed#: E4 -75 Encounter: [id number]  Date Of Visit: 06/20/23        Hospital Course:     78-year-old male patient who presented with chest discomfort found to have NSTEMI and underwent cardiac catheterization with multivessel disease    Assessment:      Principal Problem:    NSTEMI (non-ST elevated myocardial infarction) ([facility] [address] )  Active Problems:    HTN (hypertension)    Compliance with medication regimen    History of CVA (cerebrovascular accident)    Coronary artery disease involving native coronary artery      Plan:    · Postcatheterization care       VTE Pharmacologic Prophylaxis:   Pharmacologic: Heparin Drip  Mechanical VTE Prophylaxis in Place: Yes    Patient Centered Rounds: I have performed bedside rounds with nursing staff today  Discussions with Specialists or Other Care Team Provider: Discussed with cardiology    Education and Discussions with Family / Patient: Patient's sister at 4:32 PM    Time Spent for Care: 1 hour  More than 50% of total time spent on counseling and coordination of care as described above  Current Length of Stay: 1 day(s)    Current Patient Status: Inpatient   Certification Statement: The patient will continue to require additional inpatient hospital stay due to Postcatheterization care    Discharge Plan / Estimated Discharge Date: tomorrow    Code Status: Level 1 - Full Code      Subjective:   Patient seen and examined, no acute complaints, no chest pain, no nausea no vomiting, no abdominal pain    A complete and comprehensive 14 point organ system review has been performed and all other systems are negative other than stated above  Objective:     Vitals:   Temp (24hrs), Av 3 °F (36 8 °C), Min:98 1 °F (36 7 °C), Max:98 4 °F (36 9 °C)    Temp:  [98 1 °F (36 7 °C)-98 4 °F (36 9 °C)] 98 4 °F (36 9 °C)  HR:  [43-88] 53  Resp:  [18] 18  BP: (101-184)/(62-86) 133/62  SpO2:  [91 %-95 %] 94 %  Body mass index is 22 67 kg/m²  Input and Output Summary (last 24 hours):        Intake/Output Summary (Last 24 hours) at 2023 1631  Last data filed at 2023 2305  Gross per 24 hour   Intake --   Output 450 ml   Net -450 ml       Physical Exam:     General: well appearing, no acute distress  HEENT: atraumatic, PERRLA, moist mucosa, normal pharynx, normal tonsils and adenoids, normal tongue, no fluid in sinuses  Neck: Trachea midline, no carotid bruit, no masses  Respiratory: normal chest wall expansion, CTA B, no r/r/w, no rubs  Cardiovascular: RRR, no m/r/g, Normal S1 and S2  Abdomen: Soft, non-tender, non-distended, normal bowel sounds in all quadrants, no hepatosplenomegaly, no tympany  Rectal: deferred  Musculoskeletal: normal ROM in upper and lower extremities  Integumentary: warm, dry, and pink, with no rash, purpura, or petechia  Heme/Lymph: no lymphadenopathy, no bruises  Neurological: Cranial Nerves II-XII grossly intact  Psychiatric: cooperative with normal mood, affect, and cognition      Additional Data:     Labs:    Results from last 7 days   Lab Units 06/19/23  0012   WBC Thousand/uL 10 19*   HEMOGLOBIN g/dL 12 9   HEMATOCRIT % 39 7   PLATELETS Thousands/uL 237   NEUTROS PCT % 62   LYMPHS PCT % 27   MONOS PCT % 7   EOS PCT % 2     Results from last 7 days   Lab Units 06/20/23  0618 06/19/23  0012   POTASSIUM mmol/L 3 8 3 7   CHLORIDE mmol/L 107 107   CO2 mmol/L 25 24   BUN mg/dL 13 14   CREATININE mg/dL 0 90 0 96   CALCIUM mg/dL 8 7 8 9   ALK PHOS U/L  --  56   ALT U/L  --  10   AST U/L  --  14     Results from last 7 days   Lab Units 06/19/23  0012   INR  0 99       * I Have Reviewed All Lab Data Listed Above  * Additional Pertinent Lab Tests Reviewed:  [facility] Admission Reviewed    Imaging:    Imaging Reports Reviewed Today Include: Cardiac catheterization report  Imaging Personally Reviewed by Myself Includes: Cardiac catheterization report    Recent Cultures (last 7 days):           Last 24 Hours Medication List:   Current Facility-Administered Medications   Medication Dose Route Frequency Provider Last Rate   • amLODIPine  10 mg Oral Daily SAMMIE Thompson     • aspirin  81 mg Oral Daily Cande Gonzalez PA-C     • atorvastatin  80 mg Oral Daily With Lesvia Brothers ALBERT Gonzalez     • clopidogrel  75 mg Oral Daily Maureen SAMMIE Ellis     • heparin (porcine)  3-20 Units/kg/hr (Order-Specific) Intravenous Titrated SAMMIE Wise Stopped (06/20/23 0941)   • heparin (porcine)  2,000 Units Intravenous Q6H PRN Cande Gonzalez PA-C     • heparin (porcine)  4,000 Units Intravenous Q6H PRN Cande Gonzalez PA-C     • lisinopril  40 mg Oral Daily SAMMIE Thompson     • [START ON 6/21/2023] metoprolol succinate  25 mg Oral Daily Kennedy Wei DO     • nitroglycerin  0 4 mg Sublingual Q5 Min PRN Simi Ash PA-C          Today, Patient Was Seen By: Marlin Bojorquez DO    ** Please Note: This note was completed in part utilizing [employer] Flu[employer] Direct Software  Grammatical errors, random word insertions, spelling mistakes, and incomplete sentences may be an occasional consequence of this system secondary to software limitations, ambient noise, and hardware issues  If you have any questions or concerns about the content, text, or information contained within the body of this dictation, please contact the provider for clarification   **

## 2023-06-20 NOTE — PLAN OF CARE
Problem: Potential for Falls  Goal: Patient will remain free of falls  Description: INTERVENTIONS:  - Educate patient/family on patient safety including physical limitations  - Instruct patient to call for assistance with activity   - Consult OT/PT to assist with strengthening/mobility   - Keep Call bell within reach  - Keep bed low and locked with side rails adjusted as appropriate  - Keep care items and personal belongings within reach  - Initiate and maintain comfort rounds  - Make Fall Risk Sign visible to staff  - Offer Toileting every 2 Hours, in advance of need  - Initiate/Maintain bed alarm  - Obtain necessary fall risk management equipment: bed alarm  - Apply yellow socks and bracelet for high fall risk patients  - Consider moving patient to room near nurses station  Outcome: Progressing     Problem: PAIN - ADULT  Goal: Verbalizes/displays adequate comfort level or baseline comfort level  Description: Interventions:  - Encourage patient to monitor pain and request assistance  - Assess pain using appropriate pain scale  - Administer analgesics based on type and severity of pain and evaluate response  - Implement non-pharmacological measures as appropriate and evaluate response  - Consider cultural and social influences on pain and pain management  - Notify physician/advanced practitioner if interventions unsuccessful or patient reports new pain  Outcome: Progressing     Problem: INFECTION - ADULT  Goal: Absence or prevention of progression during hospitalization  Description: INTERVENTIONS:  - Assess and monitor for signs and symptoms of infection  - Monitor lab/diagnostic results  - Monitor all insertion sites, i e  indwelling lines, tubes, and drains  - [place] appropriate cooling/warming therapies per order  - Administer medications as ordered  - Instruct and encourage patient and family to use good hand hygiene technique    Outcome: Progressing     Problem: DISCHARGE PLANNING  Goal: Discharge to home or other facility with appropriate resources  Description: INTERVENTIONS:  - Identify barriers to discharge w/patient and caregiver  - Arrange for needed discharge resources and transportation as appropriate  - Identify discharge learning needs (meds, wound care, etc )  - Refer to Case Management Department for coordinating discharge planning if the patient needs post-hospital services based on physician/advanced practitioner order or complex needs related to functional status, cognitive ability, or social support system  Outcome: Progressing     Problem: Knowledge Deficit  Goal: Patient/family/caregiver demonstrates understanding of disease process, treatment plan, medications, and discharge instructions  Description: Complete learning assessment and assess knowledge base    Interventions:  - Provide teaching at level of understanding  - Provide teaching via preferred learning methods  Outcome: Progressing     Problem: CARDIOVASCULAR - ADULT  Goal: Maintains optimal cardiac output and hemodynamic stability  Description: INTERVENTIONS:  - Monitor I/O, vital signs and rhythm  - Monitor for S/S and trends of decreased cardiac output  - Administer and titrate ordered vasoactive medications to optimize hemodynamic stability  - Assess quality of pulses, skin color and temperature  - Assess for signs of decreased coronary artery perfusion  - Instruct patient to report change in severity of symptoms  Outcome: Progressing  Goal: Absence of cardiac dysrhythmias or at baseline rhythm  Description: INTERVENTIONS:  - Continuous cardiac monitoring, vital signs, obtain 12 lead EKG if ordered  - Administer antiarrhythmic and heart rate control medications as ordered  - Monitor electrolytes and administer replacement therapy as ordered  Outcome: Progressing     Problem: GASTROINTESTINAL - ADULT  Goal: Minimal or absence of nausea and/or vomiting  Description: INTERVENTIONS:  - Administer IV fluids if ordered to ensure adequate hydration  - Maintain NPO status until nausea and vomiting are resolved  - Nasogastric tube if ordered  - Administer ordered antiemetic medications as needed  - Provide nonpharmacologic comfort measures as appropriate  - Advance diet as tolerated, if ordered  - Consider nutrition services referral to assist patient with adequate nutrition and appropriate food choices  Outcome: Progressing  Goal: Maintains adequate nutritional intake  Description: INTERVENTIONS:  - Monitor percentage of each meal consumed  - Identify factors contributing to decreased intake, treat as appropriate  - Assist with meals as needed  - Monitor I&O, weight, and lab values if indicated  - Obtain nutrition services referral as needed  Outcome: Progressing

## 2023-06-20 NOTE — CASE MANAGEMENT
Case Management Assessment & Discharge Planning Note    Patient name Evie Cardona  Location [address] [place] [address] /E4  [phone]-* MRN [id number]  : 1945 Date 2023       Current Admission Date: 2023  Current Admission Diagnosis:NSTEMI (non-ST elevated myocardial infarction) [facility])   Patient Active Problem List    Diagnosis Date Noted   • Coronary artery disease involving native coronary artery 2023   • NSTEMI (non-ST elevated myocardial infarction) ([facility] [address] ) 2023   • Bifascicular block 2023   • Compliance with medication regimen 2023   • History of CVA (cerebrovascular accident) 2023   • Anemia 2020   • Fatigue 2020   • Lack of appetite 2020   • Constipation 2020   • Insomnia 2020   • Hypercholesterolemia 2020   • Prediabetes 2020   • Acute lacunar stroke ([facility] [address] ) 2020   • HTN (hypertension) 2020   • Elevated troponin 2020      LOS (days): 1  Geometric Mean LOS (GMLOS) (days): 2 40  Days to GMLOS:1     OBJECTIVE:    Risk of Unplanned Readmission Score: 9 38         Current admission status: Inpatient       Preferred Pharmacy:   [address] [place] [address]  Phone: [phone] Fax: [facility]  [facility] [address] [address]  Phone: [phone] Fax: [phone] [facility] [address] [facility] [address]  Phone: [phone] Fax: [phone]    Primary Care Provider: Mercedes Joy MD    Primary Insurance: [facility]  Secondary Insurance:     ASSESSMENT:  Jonas Leary [address] - Sister   Primary Phone: [phone] (Mobile)               Advance Directives  Does patient have a [address] [place] [address] Avenue?: Yes  Does patient have Advance Directives?: Yes  Advance Directives: Living will, Power of  for health care  Primary Contact: Tegan Lau (Sister)   [phone]         Readmission Root Cause  30 Day Readmission: No    Patient Information  Admitted from[de-identified] Home  Mental Status: Alert  During Assessment patient was accompanied by: Other-Comment (Tegan sister provided assessment information as well as chart review)  Assessment information provided by[de-identified] Sister (Tegan)  Primary Caregiver: Self  Support Systems: Family members  [place] of Residence: [address] [place] [address] do you live in?: [address] [facility] entry access options   Select all that apply : Stairs  Number of steps to enter home : 5  Do the steps have railings?: Yes  Type of Current Residence: 2 story home  Upon entering residence, is there a bedroom on the main floor (no further steps)?: No  A bedroom is located on the following floor levels of residence (select all that apply):: 2nd Floor  Upon entering residence, is there a bathroom on the main floor (no further steps)?: No  Indicate which floors of current residence have a bathroom (select all the apply):: 2nd Floor  Number of steps to 2nd floor from main floor: One Flight  In the last 12 months, was there a time when you were not able to pay the mortgage or rent on time?: No  In the last 12 months, how many places have you lived?: 1  In the last 12 months, was there a time when you did not have a steady place to sleep or slept in a shelter (including now)?: No  Homeless/housing insecurity resource given?: N/A  Living Arrangements: Other (Comment) (sister Sharron Anna lives w/ patient)  Is patient a ?: Yes  Is patient active with [facility])?: No    Activities of Daily Living Prior to Admission  Functional Status: Independent  Completes ADLs independently?: Yes  Ambulates independently?: Yes  Does patient use assisted devices?: Yes  Assisted Devices (DME) used: Straight Cane  Does patient currently own DME?: Yes  What DME does the patient currently own?: Straight Dmitry  Does patient have a history of Outpatient Therapy (PT/OT)?: No  Does the patient have a history of Short-Term Rehab?: Yes ( [facility])  Does patient have a history of HHC?: No  Does patient currently have [address]?: No    Means of Transportation  Means of Transport to Appts[de-identified] Family transport  In the past 12 months, has lack of transportation kept you from medical appointments or from getting medications?: No  In the past 12 months, has lack of transportation kept you from meetings, work, or from getting things needed for daily living?: No  Was application for public transport provided?: N/A        DISCHARGE DETAILS:    Discharge planning discussed with[de-identified] Tegan england  Freedom of Choice: Yes     CM contacted family/caregiver?: Yes     Contacts  Patient Contacts: Tegan Lau (Sister)   [phone]  Relationship to Patient[de-identified] Family  Contact Method: Phone  Phone Number: Tegan Lau (Sister)   [phone]  Reason/Outcome: Continuity of Care, Emergency Contact, Discharge [address]         Is the patient interested in [address] at discharge?: No    DME Referral Provided  Referral made for DME?: No    Other Referral/Resources/Interventions Provided:  Interventions: None Indicated    Treatment Team Recommendation: Home  Discharge Destination Plan[de-identified] Home  Transport at Discharge : Automobile, Family

## 2023-06-21 LAB
ANION GAP SERPL CALCULATED.3IONS-SCNC: 8 MMOL/L
APTT PPP: 51 SECONDS (ref 23–37)
BUN SERPL-MCNC: 11 MG/DL (ref 5–25)
CALCIUM SERPL-MCNC: 8.3 MG/DL (ref 8.4–10.2)
CHLORIDE SERPL-SCNC: 106 MMOL/L (ref 96–108)
CO2 SERPL-SCNC: 24 MMOL/L (ref 21–32)
CREAT SERPL-MCNC: 0.81 MG/DL (ref 0.6–1.3)
ERYTHROCYTE [DISTWIDTH] IN BLOOD BY AUTOMATED COUNT: 13.2 % (ref 11.6–15.1)
GFR SERPL CREATININE-BSD FRML MDRD: 84 ML/MIN/1.73SQ M
GLUCOSE SERPL-MCNC: 99 MG/DL (ref 65–140)
HCT VFR BLD AUTO: 36.2 % (ref 36.5–49.3)
HGB BLD-MCNC: 11.8 G/DL (ref 12–17)
MCH RBC QN AUTO: 29.5 PG (ref 26.8–34.3)
MCHC RBC AUTO-ENTMCNC: 32.6 G/DL (ref 31.4–37.4)
MCV RBC AUTO: 91 FL (ref 82–98)
PLATELET # BLD AUTO: 176 THOUSANDS/UL (ref 149–390)
PMV BLD AUTO: 10.2 FL (ref 8.9–12.7)
POTASSIUM SERPL-SCNC: 3.7 MMOL/L (ref 3.5–5.3)
RBC # BLD AUTO: 4 MILLION/UL (ref 3.88–5.62)
SODIUM SERPL-SCNC: 138 MMOL/L (ref 135–147)
WBC # BLD AUTO: 7.89 THOUSAND/UL (ref 4.31–10.16)

## 2023-06-21 PROCEDURE — 99232 SBSQ HOSP IP/OBS MODERATE 35: CPT | Performed by: INTERNAL MEDICINE

## 2023-06-21 PROCEDURE — 99233 SBSQ HOSP IP/OBS HIGH 50: CPT | Performed by: INTERNAL MEDICINE

## 2023-06-21 PROCEDURE — 85730 THROMBOPLASTIN TIME PARTIAL: CPT | Performed by: INTERNAL MEDICINE

## 2023-06-21 PROCEDURE — 85027 COMPLETE CBC AUTOMATED: CPT | Performed by: INTERNAL MEDICINE

## 2023-06-21 PROCEDURE — 80048 BASIC METABOLIC PNL TOTAL CA: CPT | Performed by: INTERNAL MEDICINE

## 2023-06-21 RX ORDER — HEPARIN SODIUM 10000 [USP'U]/100ML
3-20 INJECTION, SOLUTION INTRAVENOUS
Status: DISCONTINUED | OUTPATIENT
Start: 2023-06-21 | End: 2023-06-22 | Stop reason: HOSPADM

## 2023-06-21 RX ADMIN — ASPIRIN 81 MG 81 MG: 81 TABLET ORAL at 09:09

## 2023-06-21 RX ADMIN — METOPROLOL SUCCINATE 25 MG: 25 TABLET, EXTENDED RELEASE ORAL at 09:08

## 2023-06-21 RX ADMIN — HEPARIN SODIUM 12 UNITS/KG/HR: 10000 INJECTION, SOLUTION INTRAVENOUS at 15:48

## 2023-06-21 RX ADMIN — CLOPIDOGREL BISULFATE 75 MG: 75 TABLET ORAL at 09:08

## 2023-06-21 RX ADMIN — ATORVASTATIN CALCIUM 80 MG: 80 TABLET, FILM COATED ORAL at 15:48

## 2023-06-21 RX ADMIN — HEPARIN SODIUM 12 UNITS/KG/HR: 10000 INJECTION, SOLUTION INTRAVENOUS at 19:31

## 2023-06-21 NOTE — PROGRESS NOTES
[address] [place] [address]  Progress Note  Name: Marcia Miller  MRN: [id number]  Unit/Bed#: E4 -01 I Date of Admission: 6/18/2023   Date of Service: 6/21/2023 I Hospital Day: 2    Assessment/Plan   * NSTEMI (non-ST elevated myocardial infarction) [facility])  Assessment & Plan  Evidence of multivessel coronary disease  Will need outpatient follow-up discuss coronary bypass graft and cardiac rehabilitation  Continue heparin for additional 48 hours and then discharged home  Did develop bradycardia secondary to metoprolol use and will reduce Lopressor  Better controlled, asymptomatic      Coronary artery disease involving native coronary artery  Assessment & Plan  Patient with evidence of coronary disease involving multiple vessels    3V-CAD: severe lesions in the oLAD, oDIA,  oLCX, pOM, and the pRCA is a subtotal occlusion with robust collaterals from the left  •  LVEDP is normal without gradient on LV-AO pullback  •  Severe right Subclavian Artery stenosis noted as well  We will remain on heparin drip until tomorrow  Patient to have outpatient CABG evaluation, unlikely to go to that appointment as patient does not want surgery      History of CVA (cerebrovascular accident)  Assessment & Plan  W/residual numbness of left arm/leg  Resume statin, will asa load and then continue w/asa 81mg     Compliance with medication regimen  Assessment & Plan  Apparently stopped taking most of his meds 4 days ago per ed d/w pt's sister  Recommend medication compliance    HTN (hypertension)  Assessment & Plan  Restart toprol, lisinopril/norvasc           [facility] Progress Note  Patient: Bryce Taylor 76 y o  male   MRN: [id number]  PCP: Ashley Elizondo MD  Unit/Bed#: E4 [facility]-01 Encounter: [id number]  Date Of Visit: 06/21/23        Hospital Course:     41-year-old male patient admitted with myocardial ischemia found to have multivessel disease    Assessment:      Principal Problem:    NSTEMI (non-ST elevated myocardial infarction) ([facility] [address] )  Active Problems:    HTN (hypertension)    Compliance with medication regimen    History of CVA (cerebrovascular accident)    Coronary artery disease involving native coronary artery      Plan:    · Continue heparin drip until tomorrow       VTE Pharmacologic Prophylaxis:   Pharmacologic: Heparin Drip  Mechanical VTE Prophylaxis in Place: Yes    Patient Centered Rounds: I have performed bedside rounds with nursing staff today  Discussions with Specialists or Other Care Team Provider: Discussed with case management    Education and Discussions with Family / Patient: Patient family    Time Spent for Care: 1 hour  More than 50% of total time spent on counseling and coordination of care as described above  Current Length of Stay: 2 day(s)    Current Patient Status: Inpatient   Certification Statement: The patient will continue to require additional inpatient hospital stay due to Heparin drip    Discharge Plan / Estimated Discharge Date: Tomorrow    Code Status: Level 1 - Full Code      Subjective: And examined, no acute complaints  No nausea no vomiting, no abdominal pain  Otherwise feels well    A complete and comprehensive 14 point organ system review has been performed and all other systems are negative other than stated above  Objective:     Vitals:   Temp (24hrs), Av 9 °F (36 6 °C), Min:97 7 °F (36 5 °C), Max:98 °F (36 7 °C)    Temp:  [97 7 °F (36 5 °C)-98 °F (36 7 °C)] 97 9 °F (36 6 °C)  HR:  [48-62] 62  Resp:  [18] 18  BP: (108-156)/(66-80) 140/67  SpO2:  [93 %-97 %] 93 %  Body mass index is 22 67 kg/m²  Input and Output Summary (last 24 hours):        Intake/Output Summary (Last 24 hours) at 2023 1827  Last data filed at 2023 0911  Gross per 24 hour   Intake --   Output 875 ml   Net -875 ml       Physical Exam:     General: well appearing, no acute distress  HEENT: atraumatic, PERRLA, moist mucosa, normal pharynx, normal tonsils and adenoids, normal tongue, no fluid in sinuses  Neck: Trachea midline, no carotid bruit, no masses  Respiratory: normal chest wall expansion, CTA B, no r/r/w, no rubs  Cardiovascular: RRR, no m/r/g, Normal S1 and S2  Abdomen: Soft, non-tender, non-distended, normal bowel sounds in all quadrants, no hepatosplenomegaly, no tympany  Rectal: deferred  Musculoskeletal: normal ROM in upper and lower extremities  Integumentary: warm, dry, and pink, with no rash, purpura, or petechia  Heme/Lymph: no lymphadenopathy, no bruises  Neurological: Cranial Nerves II-XII grossly intact  Psychiatric: cooperative with normal mood, affect, and cognition      Additional Data:     Labs:    Results from last 7 days   Lab Units 06/21/23  0526 06/19/23  0012   WBC Thousand/uL 7 89 10 19*   HEMOGLOBIN g/dL 11 8* 12 9   HEMATOCRIT % 36 2* 39 7   PLATELETS Thousands/uL 176 237   NEUTROS PCT %  --  62   LYMPHS PCT %  --  27   MONOS PCT %  --  7   EOS PCT %  --  2     Results from last 7 days   Lab Units 06/21/23  0526 06/20/23  0618 06/19/23  0012   POTASSIUM mmol/L 3 7   < > 3 7   CHLORIDE mmol/L 106   < > 107   CO2 mmol/L 24   < > 24   BUN mg/dL 11   < > 14   CREATININE mg/dL 0 81   < > 0 96   CALCIUM mg/dL 8 3*   < > 8 9   ALK PHOS U/L  --   --  56   ALT U/L  --   --  10   AST U/L  --   --  14    < > = values in this interval not displayed  Results from last 7 days   Lab Units 06/19/23  0012   INR  0 99       * I Have Reviewed All Lab Data Listed Above  * Additional Pertinent Lab Tests Reviewed:  [facility] Admission Reviewed    Imaging:    Imaging Reports Reviewed Today Include: none  Imaging Personally Reviewed by Myself Includes:  none    Recent Cultures (last 7 days):           Last 24 Hours Medication List:   Current Facility-Administered Medications   Medication Dose Route Frequency Provider Last Rate   • amLODIPine  10 mg Oral Daily SAMMIE Thompson     • aspirin  81 mg Oral Daily Cande Bocanegra PA-C     • atorvastatin  80 mg Oral Daily With Big Gurwinder Gonzalez PA-C     • clopidogrel  75 mg Oral Daily SAMMIE Thompson     • heparin (porcine)  3-20 Units/kg/hr (Order-Specific) Intravenous Titrated Viola Batres DO 12 Units/kg/hr (06/21/23 9258)   • heparin (porcine)  2,000 Units Intravenous Q6H PRN Cande Gonzalez PA-C     • heparin (porcine)  4,000 Units Intravenous Q6H PRN Cande Gonzalez PA-C     • lisinopril  40 mg Oral Daily SAMMIE Thompson     • metoprolol succinate  25 mg Oral Daily Kennedy Madden DO     • nitroglycerin  0 4 mg Sublingual Q5 Min PRN Santino Jorge PA-C          Today, Patient Was Seen By: Viola Batres DO    ** Please Note: This note was completed in part utilizing [employer] retargeting Direct Software  Grammatical errors, random word insertions, spelling mistakes, and incomplete sentences may be an occasional consequence of this system secondary to software limitations, ambient noise, and hardware issues  If you have any questions or concerns about the content, text, or information contained within the body of this dictation, please contact the provider for clarification   **

## 2023-06-21 NOTE — PROGRESS NOTES
"Progress Note - Cardiology   Harley Denson 76 y o  male MRN: [id number]  Unit/Bed#: E4 -01 Encounter: [id number]    Assessment:    • Non-ST elevation myocardial infarction  • Severe three-vessel coronary artery disease with good distal targets  • Normal left ventricular function, EF 70% by echocardiogram on 3/20/2020  • Hypertension  • History of CVA March 2020  • Prediabetes  • Questionable medical compliance    Plan:    • Heparin for 48 hours which will be over tomorrow morning  • Discharge tomorrow on present medications      Interval history:  No further chest discomfort patient's cardiac catheterization demonstrated severe three-vessel coronary artery disease with good distal targets  However, he is adamant against heart surgery  Plan is 48 hours of heparin which will end tomorrow morning, then discharge on aspirin and Plavix    Would also continue metoprolol, lisinopril, amlodipine and atorvastatin    PROBLEM LIST:    Patient Active Problem List    Diagnosis Date Noted   • Coronary artery disease involving native coronary artery 06/20/2023   • NSTEMI (non-ST elevated myocardial infarction) ([place] [address] ) 06/19/2023   • Compliance with medication regimen 06/19/2023   • History of CVA (cerebrovascular accident) 06/19/2023   • Anemia 04/09/2020   • Fatigue 04/09/2020   • Lack of appetite 03/29/2020   • Constipation 03/29/2020   • Insomnia 03/29/2020   • Hypercholesterolemia 03/26/2020   • Prediabetes 03/26/2020   • Acute lacunar stroke ([facility] ) 03/20/2020   • HTN (hypertension) 03/20/2020   • Elevated troponin 03/20/2020       Vitals: /72 (BP Location: Left arm)   Pulse 57   Temp 97 9 °F (36 6 °C) (Temporal)   Resp 18   Ht 5' 10\" (1 778 m)   Wt 71 7 kg (158 lb)   SpO2 94%   BMI 22 67 kg/m²   PREVIOUS WEIGHTS:   Weight (last 2 days)     Date/Time Weight    06/19/23 1430 71 7 (158)    06/19/23 0303 72 1 (158 95)    06/19/23 0002 74 4 (164 02)          Wt Readings from Last 2 Encounters:   06/19/23 71 7 kg " (158 lb)   04/14/20 67 5 kg (148 lb 13 oz)       Labs/Data:        Results from last 7 days   Lab Units 06/21/23  0526 06/19/23  0012   WBC Thousand/uL 7 89 10 19*   HEMOGLOBIN g/dL 11 8* 12 9   HEMATOCRIT % 36 2* 39 7   MCV fL 91 91   MCH pg 29 5 29 5   MCHC g/dL 32 6 32 5   PLATELETS Thousands/uL 176 237     Results from last 7 days   Lab Units 06/21/23  0526 06/20/23  0618 06/19/23  0012   EGFR ml/min/1 73sq m 84 80 74   SODIUM mmol/L 138 140 143   POTASSIUM mmol/L 3 7 3 8 3 7   CHLORIDE mmol/L 106 107 107   CO2 mmol/L 24 25 24   BUN mg/dL 11 13 14   CREATININE mg/dL 0 81 0 90 0 96     Results from last 7 days   Lab Units 06/21/23  0526 06/20/23  0618 06/19/23  0012   CALCIUM mg/dL 8 3* 8 7 8 9   AST U/L  --   --  14   ALT U/L  --   --  10   ALK PHOS U/L  --   --  56   MAGNESIUM mg/dL  --   --  2 1       Lab Results   Component Value Date    CHOLESTEROL 191 06/20/2023    TRIG 142 06/20/2023    HDL 53 06/20/2023    LDLCALC 110 (H) 06/20/2023    HGBA1C 5 9 (H) 06/19/2023       Results from last 7 days   Lab Units 06/19/23  0012   INR  0 99   PROTIME seconds 13 1          Current Facility-Administered Medications:   •  amLODIPine (NORVASC) tablet 10 mg, 10 mg, Oral, Daily, SAMMIE Thompson, 10 mg at 06/20/23 0908  •  aspirin chewable tablet 81 mg, 81 mg, Oral, Daily, Cande Gonzalez PA-C, 81 mg at 06/21/23 0909  •  atorvastatin (LIPITOR) tablet 80 mg, 80 mg, Oral, Daily With Marleny Gonzalez PA-C, 80 mg at 06/20/23 1614  •  clopidogrel (PLAVIX) tablet 75 mg, 75 mg, Oral, Daily, SAMMIE Thompson, 75 mg at 06/21/23 0908  •  heparin (porcine) injection 2,000 Units, 2,000 Units, Intravenous, Q6H PRN, Cande Gonzalez PA-C  •  heparin (porcine) injection 4,000 Units, 4,000 Units, Intravenous, Q6H PRN, Cande Gonzalez PA-C  •  lisinopril (ZESTRIL) tablet 40 mg, 40 mg, Oral, Daily, SAMMIE Thompson, 40 mg at 06/20/23 0909  •  metoprolol succinate (TOPROL-XL) 24 hr tablet 25 mg, 25 mg, Oral, Daily, Ugo Cedillo DO, 25 mg at 06/21/23 9580  •  nitroglycerin (NITROSTAT) SL tablet 0 4 mg, 0 4 mg, Sublingual, Q5 Min PRN, Cande Gonzalez PA-C, 0 4 mg at 06/19/23 0557  No Known Allergies      Intake/Output Summary (Last 24 hours) at 6/21/2023 1430  Last data filed at 6/21/2023 9629  Gross per 24 hour   Intake --   Output 875 ml   Net -875 ml       Invasive Devices     Peripheral Intravenous Line  Duration           Peripheral IV 06/19/23 Right;Ventral (anterior) Forearm 2 days                Review of Systems   Constitutional: Negative for activity change  Respiratory: Negative for cough, chest tightness, shortness of breath and wheezing  Cardiovascular: Negative for chest pain, palpitations and leg swelling  Musculoskeletal: Negative for gait problem  Skin: Negative for color change  Neurological: Negative for dizziness, tremors, syncope, weakness, light-headedness and headaches  Psychiatric/Behavioral: Negative for agitation and confusion  Physical Exam  Vitals reviewed  Constitutional:       General: He is not in acute distress  Appearance: He is well-developed  HENT:      Head: Normocephalic and atraumatic  Neck:      Thyroid: No thyromegaly  Vascular: No carotid bruit or JVD  Trachea: No tracheal deviation  Cardiovascular:      Rate and Rhythm: Normal rate and regular rhythm  Pulses: Normal pulses  Heart sounds: Normal heart sounds  No murmur heard  No friction rub  No gallop  Pulmonary:      Effort: Pulmonary effort is normal  No respiratory distress  Breath sounds: Normal breath sounds  No wheezing, rhonchi or rales  Chest:      Chest wall: No tenderness  Musculoskeletal:      Cervical back: Normal range of motion and neck supple  Right lower leg: No edema  Left lower leg: No edema  Skin:     General: Skin is warm and dry  Neurological:      General: No focal deficit present        Mental Status: He is alert and "oriented to person, place, and time  Psychiatric:         Mood and Affect: Mood normal          Behavior: Behavior normal          Thought Content: Thought content normal          Judgment: Judgment normal          ======================================================     Imaging:   I have personally reviewed pertinent reports  EKG: Sinus rhythm    Portions of the record may have been created with voice recognition software  Occasional wrong word or \"sound a like\" substitutions may have occurred due to the inherent limitations of voice recognition software  Read the chart carefully and recognize, using context, where substitutions have occurred      "

## 2023-06-21 NOTE — PLAN OF CARE
Problem: Potential for Falls  Goal: Patient will remain free of falls  Description: INTERVENTIONS:  - Educate patient/family on patient safety including physical limitations  - Instruct patient to call for assistance with activity   - Consult OT/PT to assist with strengthening/mobility   - Keep Call bell within reach  - Keep bed low and locked with side rails adjusted as appropriate  - Keep care items and personal belongings within reach  - Initiate and maintain comfort rounds  - Make Fall Risk Sign visible to staff  - Offer Toileting every 2 Hours, in advance of need  - Initiate/Maintain bed alarm  - Obtain necessary fall risk management equipment: bed alarm  - Apply yellow socks and bracelet for high fall risk patients  - Consider moving patient to room near nurses station  Outcome: Progressing     Problem: PAIN - ADULT  Goal: Verbalizes/displays adequate comfort level or baseline comfort level  Description: Interventions:  - Encourage patient to monitor pain and request assistance  - Assess pain using appropriate pain scale  - Administer analgesics based on type and severity of pain and evaluate response  - Implement non-pharmacological measures as appropriate and evaluate response  - Consider cultural and social influences on pain and pain management  - Notify physician/advanced practitioner if interventions unsuccessful or patient reports new pain  Outcome: Progressing     Problem: INFECTION - ADULT  Goal: Absence or prevention of progression during hospitalization  Description: INTERVENTIONS:  - Assess and monitor for signs and symptoms of infection  - Monitor lab/diagnostic results  - Monitor all insertion sites, i e  indwelling lines, tubes, and drains  - Brogue appropriate cooling/warming therapies per order  - Administer medications as ordered  - Instruct and encourage patient and family to use good hand hygiene technique    Outcome: Progressing     Problem: DISCHARGE PLANNING  Goal: Discharge to home or other facility with appropriate resources  Description: INTERVENTIONS:  - Identify barriers to discharge w/patient and caregiver  - Arrange for needed discharge resources and transportation as appropriate  - Identify discharge learning needs (meds, wound care, etc )  - Refer to Case Management Department for coordinating discharge planning if the patient needs post-hospital services based on physician/advanced practitioner order or complex needs related to functional status, cognitive ability, or social support system  Outcome: Progressing     Problem: Knowledge Deficit  Goal: Patient/family/caregiver demonstrates understanding of disease process, treatment plan, medications, and discharge instructions  Description: Complete learning assessment and assess knowledge base    Interventions:  - Provide teaching at level of understanding  - Provide teaching via preferred learning methods  Outcome: Progressing     Problem: CARDIOVASCULAR - ADULT  Goal: Maintains optimal cardiac output and hemodynamic stability  Description: INTERVENTIONS:  - Monitor I/O, vital signs and rhythm  - Monitor for S/S and trends of decreased cardiac output  - Administer and titrate ordered vasoactive medications to optimize hemodynamic stability  - Assess quality of pulses, skin color and temperature  - Assess for signs of decreased coronary artery perfusion  - Instruct patient to report change in severity of symptoms  Outcome: Progressing  Goal: Absence of cardiac dysrhythmias or at baseline rhythm  Description: INTERVENTIONS:  - Continuous cardiac monitoring, vital signs, obtain 12 lead EKG if ordered  - Administer antiarrhythmic and heart rate control medications as ordered  - Monitor electrolytes and administer replacement therapy as ordered  Outcome: Progressing     Problem: GASTROINTESTINAL - ADULT  Goal: Minimal or absence of nausea and/or vomiting  Description: INTERVENTIONS:  - Administer IV fluids if ordered to ensure adequate hydration  - Maintain NPO status until nausea and vomiting are resolved  - Nasogastric tube if ordered  - Administer ordered antiemetic medications as needed  - Provide nonpharmacologic comfort measures as appropriate  - Advance diet as tolerated, if ordered  - Consider nutrition services referral to assist patient with adequate nutrition and appropriate food choices  Outcome: Progressing  Goal: Maintains adequate nutritional intake  Description: INTERVENTIONS:  - Monitor percentage of each meal consumed  - Identify factors contributing to decreased intake, treat as appropriate  - Assist with meals as needed  - Monitor I&O, weight, and lab values if indicated  - Obtain nutrition services referral as needed  Outcome: Progressing

## 2023-06-21 NOTE — ASSESSMENT & PLAN NOTE
Patient with evidence of coronary disease involving multiple vessels    3V-CAD: severe lesions in the oLAD, oDIA,  oLCX, pOM, and the pRCA is a subtotal occlusion with robust collaterals from the left  •  LVEDP is normal without gradient on LV-AO pullback  •  Severe right Subclavian Artery stenosis noted as well  We will remain on heparin drip until tomorrow  Patient to have outpatient CABG evaluation, unlikely to go to that appointment as patient does not want surgery

## 2023-06-21 NOTE — PLAN OF CARE
Problem: Potential for Falls  Goal: Patient will remain free of falls  Description: INTERVENTIONS:  - Educate patient/family on patient safety including physical limitations  - Instruct patient to call for assistance with activity   - Consult OT/PT to assist with strengthening/mobility   - Keep Call bell within reach  - Keep bed low and locked with side rails adjusted as appropriate  - Keep care items and personal belongings within reach  - Initiate and maintain comfort rounds  - Make Fall Risk Sign visible to staff  - Offer Toileting every 2 Hours, in advance of need  - Initiate/Maintain bed alarm  - Obtain necessary fall risk management equipment: bed alarm  - Apply yellow socks and bracelet for high fall risk patients  - Consider moving patient to room near nurses station  Outcome: Completed     Problem: PAIN - ADULT  Goal: Verbalizes/displays adequate comfort level or baseline comfort level  Description: Interventions:  - Encourage patient to monitor pain and request assistance  - Assess pain using appropriate pain scale  - Administer analgesics based on type and severity of pain and evaluate response  - Implement non-pharmacological measures as appropriate and evaluate response  - Consider cultural and social influences on pain and pain management  - Notify physician/advanced practitioner if interventions unsuccessful or patient reports new pain  Outcome: Completed     Problem: INFECTION - ADULT  Goal: Absence or prevention of progression during hospitalization  Description: INTERVENTIONS:  - Assess and monitor for signs and symptoms of infection  - Monitor lab/diagnostic results  - Monitor all insertion sites, i e  indwelling lines, tubes, and drains  - [place] appropriate cooling/warming therapies per order  - Administer medications as ordered  - Instruct and encourage patient and family to use good hand hygiene technique    Outcome: Completed     Problem: DISCHARGE PLANNING  Goal: Discharge to home or other facility with appropriate resources  Description: INTERVENTIONS:  - Identify barriers to discharge w/patient and caregiver  - Arrange for needed discharge resources and transportation as appropriate  - Identify discharge learning needs (meds, wound care, etc )  - Refer to Case Management Department for coordinating discharge planning if the patient needs post-hospital services based on physician/advanced practitioner order or complex needs related to functional status, cognitive ability, or social support system  Outcome: Completed     Problem: Knowledge Deficit  Goal: Patient/family/caregiver demonstrates understanding of disease process, treatment plan, medications, and discharge instructions  Description: Complete learning assessment and assess knowledge base    Interventions:  - Provide teaching at level of understanding  - Provide teaching via preferred learning methods  Outcome: Completed     Problem: CARDIOVASCULAR - ADULT  Goal: Maintains optimal cardiac output and hemodynamic stability  Description: INTERVENTIONS:  - Monitor I/O, vital signs and rhythm  - Monitor for S/S and trends of decreased cardiac output  - Administer and titrate ordered vasoactive medications to optimize hemodynamic stability  - Assess quality of pulses, skin color and temperature  - Assess for signs of decreased coronary artery perfusion  - Instruct patient to report change in severity of symptoms  Outcome: Completed  Goal: Absence of cardiac dysrhythmias or at baseline rhythm  Description: INTERVENTIONS:  - Continuous cardiac monitoring, vital signs, obtain 12 lead EKG if ordered  - Administer antiarrhythmic and heart rate control medications as ordered  - Monitor electrolytes and administer replacement therapy as ordered  Outcome: Completed     Problem: GASTROINTESTINAL - ADULT  Goal: Minimal or absence of nausea and/or vomiting  Description: INTERVENTIONS:  - Administer IV fluids if ordered to ensure adequate hydration  - Maintain NPO status until nausea and vomiting are resolved  - Nasogastric tube if ordered  - Administer ordered antiemetic medications as needed  - Provide nonpharmacologic comfort measures as appropriate  - Advance diet as tolerated, if ordered  - Consider nutrition services referral to assist patient with adequate nutrition and appropriate food choices  Outcome: Completed  Goal: Maintains adequate nutritional intake  Description: INTERVENTIONS:  - Monitor percentage of each meal consumed  - Identify factors contributing to decreased intake, treat as appropriate  - Assist with meals as needed  - Monitor I&O, weight, and lab values if indicated  - Obtain nutrition services referral as needed  Outcome: Completed

## 2023-06-21 NOTE — ASSESSMENT & PLAN NOTE
Evidence of multivessel coronary disease  Will need outpatient follow-up discuss coronary bypass graft and cardiac rehabilitation  Continue heparin for additional 48 hours and then discharged home  Did develop bradycardia secondary to metoprolol use and will reduce Lopressor  Better controlled, asymptomatic

## 2023-06-21 NOTE — UTILIZATION REVIEW
NSTEMDate: 6/21  Day 3: Has surpassed a 2nd midnight with active treatments and services  NSTEMI post cardiac cath, continuation heparin drip  Initial Clinical Review    Admission: Date/Time/Statement:   Admission Orders (From admission, onward)     Ordered        06/19/23 0153  INPATIENT ADMISSION  Once                      Orders Placed This Encounter   Procedures   • INPATIENT ADMISSION     Standing Status:   Standing     Number of Occurrences:   1     Order Specific Question:   Level of Care     Answer:   Med Surg [16]     Order Specific Question:   Estimated length of stay     Answer:   More than 2 Midnights     Order Specific Question:   Certification     Answer:   I certify that inpatient services are medically necessary for this patient for a duration of greater than two midnights  See H&P and MD Progress Notes for additional information about the patient's course of treatment  ED Arrival Information     Expected   -    Arrival   6/18/2023 23:53    Acuity   Urgent            Means of arrival   Walk-In    Escorted by   Self    Service   Hospitalist    Admission type   Emergency            Arrival complaint   chest pain,sob           Chief Complaint   Patient presents with   • Chest Pain     Chest pain and sob starting around 1030pm  States radiates into left arm  Per family, has not taken medication in 4 days  Initial Presentation: 76 y o  male presents to the ED from home with c/o cp/sob n/v and diaphoresis after lifting a water soaked carpet and putting it over his clothesline  CP radiated to stomach  PMH: cva, HTN, bifascicular block  In the ED  Labs - troponin elevation, BNP, abnormal UA  Imaging - Normal caliber ascending aorta with mild calcification inferiorly  Mild emphysema  ECG - NSR, RBBB, bifasicular block, ST depressions  Treated with IV Zofran, Heparin bolus and drip  On exam ill-appearing, normal breath sounds, abd tenderness, A&O    He is admitted to INPATIENT status with NSTEMI - Heparin drip, ASA, NPO, Cardio consult  ? Med compliance - stopped meds 4 days PTA  Bifasicular block - caution w/ antiemetics, monitor QTC prolongation  6/19 Cardio Consult - Non-ST elevation myocardial infarction, LV function normal, EF 70% by echocardiogram on 3/20/2020, HTN - continue Heparin drip, ASA and Plavix loading, statin, antihypertensive meds, Echo, cardiac cath  Date: 6/20   Day 2:   Pt had cardiac cath - Evidence of multivessel coronary disease  Will continue IV Heparin an additional 48 hrs  Had bradycardia d/t Metoprolol use and will reduce Lopressor  Pt is pain free        6/20 Cardiac Cath -   NSTEMI (non-ST elevated myocardial infarction) ([address] ) [I21 4]   Post Procedure Diagnosis    CAD, NSTEMI (non-ST elevated myocardial infarction) ([address] ) [I21 4]   Impression   •  3V-CAD: severe lesions in the oLAD, oDIA,  oLCX, pOM, and the pRCA is a subtotal occlusion with robust collaterals from the left  •  LVEDP is normal without gradient on LV-AO pullback  •  Severe right Subclavian Artery stenosis noted as well   Recommendation  Cont GDMT optimization for CAD on DAPT for now  As patient is CP free at this time, proceed with outpatient heart team discussion with CTS given MVD  Cont risk factor reduction   on diet/lifestyle modification  Eventual cardiac rehab     ED Triage Vitals   Temperature Pulse Respirations Blood Pressure SpO2   06/19/23 0007 06/19/23 0002 06/19/23 0002 06/19/23 0002 06/19/23 0002   97 6 °F (36 4 °C) 76 18 119/68 96 %      Temp Source Heart Rate Source Patient Position - Orthostatic VS BP Location FiO2 (%)   06/19/23 0007 06/19/23 0002 06/19/23 0002 06/19/23 0002 --   Oral Monitor Sitting Right arm       Pain Score       06/19/23 0312       2          Wt Readings from Last 1 Encounters:   06/19/23 71 7 kg (158 lb)     Additional Vital Signs:   06/21/23 0754 97 7 °F (36 5 °C) 55 18 114/70 75 96 % None (Room air) Lying   06/21/23 0415 97 8 °F (36 6 °C) 50 Abnormal  18 156/80 106 97 % -- Lying   06/20/23 2300 98 °F (36 7 °C) 53 Abnormal  18 108/66 71 95 % -- Lying   06/20/23 1920 97 8 °F (36 6 °C) 48 Abnormal  18 142/68 83 93 % -- Lying   06/20/23 1815 -- 51 Abnormal  18 147/67 103 96 % None (Room air) Lying   06/20/23 1415 -- 53 Abnormal  18 133/62 81 94 % None (Room air) Lying   06/20/23 1315 -- 52 Abnormal  18 141/64 82 92 % None (Room air) Lying   06/20/23 1215 -- 55 18 176/76 Abnormal  90 94 % None (Room air) Lying   06/20/23 1115 -- 45 Abnormal  18 158/69 90 93 % None (Room air) Lying   06/20/23 1015 -- 43 Abnormal  18 158/68 100 95 % None (Room air) Lying   06/20/23 0945 -- 65 -- 146/76 89 93 % None (Room air) Lying   06/20/23 0915 -- 60 -- 171/73 Abnormal  101 94 % None (Room air) Lying   06/20/23 0900 -- 53 Abnormal  -- 184/79 Abnormal  -- 95 % None (Room air) Lying   06/20/23 0845 -- 52 Abnormal  18 163/73 115 93 % None (Room air) Lying   06/20/23 0830 -- 65 18 153/86 70 91 % None (Room air) Lying   06/20/23 0340 98 4 °F (36 9 °C) 65 18 112/74 82 93 % None (Room air) Lying   06/19/23 2346 98 3 °F (36 8 °C) 55 18 101/65 73 94 % None (Room air) Lying   06/19/23 1900 98 1 °F (36 7 °C) 88 18 104/70 75 94 % None (Room air) Lying   06/19/23 1550 98 5 °F (36 9 °C) 64 20 103/67 72 96 % None (Room air) Lying   06/19/23 1430 -- 70 -- 109/72 -- -- -- --   06/19/23 1048 98 6 °F (37 °C) 99 19 109/72 92 95 % None (Room air) Lying   06/19/23 0740 97 9 °F (36 6 °C) 66 17 93/73 78 96 % None (Room air) Lying   06/19/23 0602 -- 90 -- 93/67 72 -- -- Lying   06/19/23 0557 -- 93 -- 116/86 94 -- -- Lying   06/19/23 0549 -- 93 -- 131/89 101 -- -- Lying   06/19/23 0311 97 8 °F (36 6 °C) 81 18 129/85 102 94 % -- Lying   06/19/23 0303 -- -- -- -- -- -- None (Room air) --   06/19/23 0251 -- 83 18 124/71 -- 97 % None (Room air) Lying   06/19/23 0200 -- 87 18 138/92 110 95 % None (Room air) Lying     Pertinent Labs/Diagnostic Test Results:     6/19 ECG - Normal sinus rhythm  Right bundle branch block  Left anterior fascicular block  Bifascicular block   Abnormal ECG  6/19 ECG - Normal sinus rhythm  Right bundle branch block  Abnormal ECG    6/19 Echo - •  Left Ventricle: Left ventricular cavity size is normal  Wall thickness is mildly increased  There is mild to moderate concentric hypertrophy  The left ventricular ejection fraction is 66% by single dimension measurement  Systolic function is normal  The inferior base is severely hypokinetic to akinetic  Diastolic function is mildly abnormal, consistent with grade I (abnormal) relaxation  Left atrial filling pressure is normal   •  Aortic Valve: The aortic valve is trileaflet  The leaflets are mildly thickened  The leaflets are moderately calcified  There is mildly reduced mobility  There is mild regurgitation  The aortic valve has no significant stenosis  The aortic valve peak velocity is 1 82 m/s  The aortic valve mean gradient is 8 mmHg  The dimensionless velocity index is 0 50  The aortic valve area is 1 86 cm2  The stroke volume index is 36 00 ml/m2  •  Tricuspid Valve: There is mild regurgitation  The right ventricular systolic pressure is normal  The estimated right ventricular systolic pressure is 17 27 mmHg  6/20 ECG - Marked sinus bradycardia  Right bundle branch block  Left anterior fascicular block  Bifascicular block   Inferior infarct (cited on or before 20-JUN-2023)  T wave abnormality, consider lateral ischemia  Abnormal ECG    CT chest wo contrast   Final Result by Gwendolyn Dickens MD (06/20 7265)      Normal caliber ascending aorta with mild calcification inferiorly  Mild emphysema  XR chest 1 view portable   Final Result by Ingris Castillo MD (06/19 2216)      No acute cardiopulmonary disease           Results from last 7 days   Lab Units 06/21/23  0526 06/19/23  0012   WBC Thousand/uL 7 89 10 19*   HEMOGLOBIN g/dL 11 8* 12 9   HEMATOCRIT % 36 2* 39 7   PLATELETS Thousands/uL 176 237   NEUTROS ABS Thousands/µL  --  6 46         Results from last 7 days   Lab Units 06/21/23  0526 06/20/23  0618 06/19/23  0012   SODIUM mmol/L 138 140 143   POTASSIUM mmol/L 3 7 3 8 3 7   CHLORIDE mmol/L 106 107 107   CO2 mmol/L 24 25 24   ANION GAP mmol/L 8 8 12   BUN mg/dL 11 13 14   CREATININE mg/dL 0 81 0 90 0 96   EGFR ml/min/1 73sq m 84 80 74   CALCIUM mg/dL 8 3* 8 7 8 9   MAGNESIUM mg/dL  --   --  2 1     Results from last 7 days   Lab Units 06/19/23  0012   AST U/L 14   ALT U/L 10   ALK PHOS U/L 56   TOTAL PROTEIN g/dL 7 1   ALBUMIN g/dL 4 3   TOTAL BILIRUBIN mg/dL 0 39         Results from last 7 days   Lab Units 06/21/23  0526 06/20/23  0618 06/19/23  0012   GLUCOSE RANDOM mg/dL 99 121 151*         Results from last 7 days   Lab Units 06/19/23  0421   HEMOGLOBIN A1C % 5 9*   EAG mg/dl 123     Results from last 7 days   Lab Units 06/19/23  0421 06/19/23  0209 06/19/23  0012   HS TNI 0HR ng/L  --   --  102*   HS TNI 2HR ng/L  --  199*  --    HSTNI D2 ng/L  --  97*  --    HS TNI 4HR ng/L 425*  --   --    HSTNI D4 ng/L 323*  --   --          Results from last 7 days   Lab Units 06/20/23  0618 06/19/23  1701 06/19/23  0824 06/19/23  0012   PROTIME seconds  --   --   --  13 1   INR   --   --   --  0 99   PTT seconds 60* 64* 72* 28     Results from last 7 days   Lab Units 06/20/23  0618   TSH 3RD GENERATON uIU/mL 1 598     Results from last 7 days   Lab Units 06/19/23  0012   BNP pg/mL 287*     Results from last 7 days   Lab Units 06/19/23  0707   CLARITY UA  Clear   COLOR UA  Light Yellow   SPEC GRAV UA  1 016   PH UA  7 0   GLUCOSE UA mg/dl 150 (3/20%)*   KETONES UA mg/dl 10 (1+)*   BLOOD UA  Negative   PROTEIN UA mg/dl Trace*   NITRITE UA  Negative   BILIRUBIN UA  Negative   UROBILINOGEN UA (BE) mg/dl <2 0   LEUKOCYTES UA  Negative   WBC UA /hpf 4-10*   RBC UA /hpf 4-10*   BACTERIA UA /hpf None Seen   EPITHELIAL CELLS WET PREP /hpf None Seen   MUCUS THREADS  Occasional*       ED Treatment:   Medication Administration from 06/18/2023 2353 to 06/19/2023 0256       Date/Time Order Dose Route Action     06/19/2023 0120 EDT ondansetron [facility]) injection 4 mg 4 mg Intravenous Given     06/19/2023 0210 EDT heparin (porcine) injection 4,000 Units 4,000 Units Intravenous Given     06/19/2023 0215 EDT heparin (porcine) 25,000 units in 0 45% NaCl 250 mL infusion (premix) 12 Units/kg/hr Intravenous New Bag        Past Medical History:   Diagnosis Date   • Hypertension    • [facility] )     2017     Present on Admission:  • HTN (hypertension)      Admitting Diagnosis: Chest pain [R07 9]  Elevated troponin [R77 8]  NSTEMI (non-ST elevated myocardial infarction) ([facility] ) [I21 4]  Age/Sex: 76 y o  male  Admission Orders:  Scheduled Medications:  amLODIPine, 10 mg, Oral, Daily  aspirin, 81 mg, Oral, Daily  atorvastatin, 80 mg, Oral, Daily With Dinner  clopidogrel, 75 mg, Oral, Daily  lisinopril, 40 mg, Oral, Daily  metoprolol succinate, 25 mg, Oral, Daily      Continuous IV Infusions:  heparin (porcine), 3-20 Units/kg/hr (Order-Specific), Intravenous, Titrated  IV fluids @ 100-50 cc/hr d/c 6/20    PRN Meds:  heparin (porcine), 2,000 Units, Intravenous, Q6H PRN  heparin (porcine), 4,000 Units, Intravenous, Q6H PRN  nitroglycerin, 0 4 mg, Sublingual, Q5 Min PRN - x 2 6/19    Tele  Heparin drip  Oxygen   VS q 4 hr   IP CONSULT TO CARDIOLOGY    Network Utilization Review Department  ATTENTION: Please call with any questions or concerns to [phone] and carefully listen to the prompts so that you are directed to the right person  All voicemails are confidential   [facility] all requests for admission clinical reviews, approved or denied determinations and any other requests to dedicated fax number below belonging to the campus where the patient is receiving treatment   List of dedicated fax numbers for the Facilities:  [address] DENIALS (Administrative/Medical Necessity) [phone]   [address] (Maternity/NICU/Pediatrics) [place] [address] [place]  [phone]   [address] [place] [address] [facility] U [facility] [address] av [facility] 134 815 [facility] [phone]

## 2023-06-22 VITALS
SYSTOLIC BLOOD PRESSURE: 124 MMHG | WEIGHT: 158 LBS | BODY MASS INDEX: 22.62 KG/M2 | TEMPERATURE: 97.9 F | DIASTOLIC BLOOD PRESSURE: 59 MMHG | HEART RATE: 54 BPM | RESPIRATION RATE: 18 BRPM | HEIGHT: 70 IN | OXYGEN SATURATION: 99 %

## 2023-06-22 LAB
ANION GAP SERPL CALCULATED.3IONS-SCNC: 6 MMOL/L
APTT PPP: 53 SECONDS (ref 23–37)
BUN SERPL-MCNC: 13 MG/DL (ref 5–25)
CALCIUM SERPL-MCNC: 8.4 MG/DL (ref 8.4–10.2)
CHLORIDE SERPL-SCNC: 107 MMOL/L (ref 96–108)
CO2 SERPL-SCNC: 26 MMOL/L (ref 21–32)
CREAT SERPL-MCNC: 0.81 MG/DL (ref 0.6–1.3)
ERYTHROCYTE [DISTWIDTH] IN BLOOD BY AUTOMATED COUNT: 13.2 % (ref 11.6–15.1)
GFR SERPL CREATININE-BSD FRML MDRD: 84 ML/MIN/1.73SQ M
GLUCOSE SERPL-MCNC: 101 MG/DL (ref 65–140)
HCT VFR BLD AUTO: 35.3 % (ref 36.5–49.3)
HGB BLD-MCNC: 11.2 G/DL (ref 12–17)
MCH RBC QN AUTO: 29.1 PG (ref 26.8–34.3)
MCHC RBC AUTO-ENTMCNC: 31.7 G/DL (ref 31.4–37.4)
MCV RBC AUTO: 92 FL (ref 82–98)
PLATELET # BLD AUTO: 165 THOUSANDS/UL (ref 149–390)
PMV BLD AUTO: 10.6 FL (ref 8.9–12.7)
POTASSIUM SERPL-SCNC: 3.4 MMOL/L (ref 3.5–5.3)
RBC # BLD AUTO: 3.85 MILLION/UL (ref 3.88–5.62)
SODIUM SERPL-SCNC: 139 MMOL/L (ref 135–147)
WBC # BLD AUTO: 7.89 THOUSAND/UL (ref 4.31–10.16)

## 2023-06-22 PROCEDURE — 99239 HOSP IP/OBS DSCHRG MGMT >30: CPT | Performed by: INTERNAL MEDICINE

## 2023-06-22 PROCEDURE — 80048 BASIC METABOLIC PNL TOTAL CA: CPT | Performed by: INTERNAL MEDICINE

## 2023-06-22 PROCEDURE — 85027 COMPLETE CBC AUTOMATED: CPT | Performed by: INTERNAL MEDICINE

## 2023-06-22 PROCEDURE — 85730 THROMBOPLASTIN TIME PARTIAL: CPT | Performed by: INTERNAL MEDICINE

## 2023-06-22 RX ORDER — METOPROLOL SUCCINATE 25 MG/1
25 TABLET, EXTENDED RELEASE ORAL DAILY
Qty: 30 TABLET | Refills: 0 | Status: SHIPPED | OUTPATIENT
Start: 2023-06-23 | End: 2023-06-26 | Stop reason: SDUPTHER

## 2023-06-22 RX ORDER — METOPROLOL SUCCINATE 25 MG/1
25 TABLET, EXTENDED RELEASE ORAL DAILY
Qty: 30 TABLET | Refills: 0 | Status: SHIPPED | OUTPATIENT
Start: 2023-06-23 | End: 2023-06-22 | Stop reason: SDUPTHER

## 2023-06-22 RX ADMIN — ASPIRIN 81 MG 81 MG: 81 TABLET ORAL at 09:26

## 2023-06-22 RX ADMIN — CLOPIDOGREL BISULFATE 75 MG: 75 TABLET ORAL at 09:26

## 2023-06-22 RX ADMIN — HEPARIN SODIUM 2000 UNITS: 1000 INJECTION INTRAVENOUS; SUBCUTANEOUS at 09:27

## 2023-06-22 RX ADMIN — AMLODIPINE BESYLATE 10 MG: 10 TABLET ORAL at 09:26

## 2023-06-22 RX ADMIN — LISINOPRIL 40 MG: 20 TABLET ORAL at 09:26

## 2023-06-22 RX ADMIN — METOPROLOL SUCCINATE 25 MG: 25 TABLET, EXTENDED RELEASE ORAL at 09:26

## 2023-06-22 NOTE — PLAN OF CARE
Problem: PHYSICAL THERAPY ADULT  Goal: Performs mobility at highest level of function for planned discharge setting  See evaluation for individualized goals  Description:   Outcome: Adequate for Discharge     Problem: OCCUPATIONAL THERAPY ADULT  Goal: Performs self-care activities at highest level of function for planned discharge setting  See evaluation for individualized goals  Description:   Outcome: Adequate for Discharge     Problem: SLP ADULT - SWALLOWING, IMPAIRED  Goal: Initial SLP swallow eval performed  Outcome: Adequate for Discharge  Goal: Advance to least restrictive diet without signs or symptoms of aspiration for planned discharge setting  See evaluation for individualized goals  Description: Patient will:  2  Demonstrate effective    3  Use    Outcome: Adequate for Discharge     Problem: SLP ADULT - COMMUNICATION, IMPAIRED  Goal: Initial communication eval performed  Outcome: Adequate for Discharge  Goal: Demonstrates communication skills at highest level of function for planned discharge setting  See evaluation for individualized goals  Description: Patient will be able to:accuracy     2  Demostrate reading comprehension at    7   Ability to solve complex/advanced problems in ADL tasks and high   Outcome: Adequate for Discharge

## 2023-06-22 NOTE — UTILIZATION REVIEW
NOTIFICATION OF ADMISSION DISCHARGE   This is a Notification of Discharge from [facility]  Please be advised that this patient has been discharge from our facility  Below you will find the admission and discharge date and time including the patient’s disposition  UTILIZATION REVIEW CONTACT:  Eufemia Marsh  Utilization   Network Utilization Review Department  Phone: [phone] x carefully listen to the prompts  All voicemails are confidential   Email: [email or web address]     ADMISSION INFORMATION  PRESENTATION DATE: 6/18/2023 11:56 PM    INPATIENT ADMISSION DATE: 6/19/23  1:53 AM   DISCHARGE DATE: 6/22/2023  2:52 PM   DISPOSITION:Home/Self Care    IMPORTANT INFORMATION:  Send all requests for admission clinical reviews, approved or denied determinations and any other requests to dedicated fax number below belonging to the campus where the patient is receiving treatment   List of dedicated fax numbers:  [address] DENIALS (Administrative/Medical Necessity) [phone]   [address] (Maternity/NICU/Pediatrics) [phone]   [facility] [phone]   [address] [phone]   [address] [phone]   220 [facility] [phone]   90 [facility] [phone]   [address] [place] [address] 119 [phone]   [facility]  [phone]   4057 [facility] [phone]   412 [facility] [address] [facility] [phone]

## 2023-06-22 NOTE — DISCHARGE SUMMARY
[address] [facility]  Discharge- Denia Monday 1945, 76 y o  male MRN: [id number]  Unit/Bed#: E4 -01 Encounter: [id number]  Primary Care Provider: Sherri Rodríguez MD   Date and time admitted to hospital: 6/18/2023 11:56 PM    Admitting Provider:  Lianet Diego MD  Discharge Provider:  Shereen Singleton DO  Admission Date: 6/18/2023       Discharge Date: 06/22/23   LOS: 3  Primary Care Physician at Discharge: Sherri Rodríguez, [address] [facility] COURSE:  Jacinta Monday is a 76 y o  male who presented to the emergency room with a chief complaint of sudden onset of chest discomfort, diaphoresis, nausea and shortness of breath  The patient had been lifting a heavy rug onto a clothes line when water had pulled onto the rug causing it to overflowing sink onto his chest   He lifted the rug he developed acute onset of chest discomfort described as pressure with radiation to his left upper extremity into his hand  The patient has a past medical history of hypertension, previous history of CVA with left-sided deficits, prediabetes  He had stopped his medications for approximately 4 days as he had felt well  Patient subsequently was evaluated in consultation by the cardiology service, he underwent cardiac catheterization and was found to have multivessel coronary disease  None of the lesions were amenable to stenting but he had robust collateral circulation  Received 48 hours of heparin therapy, with plans for outpatient evaluation by cardiothoracic surgery to discuss to coronary artery bypass graft  The patient was counseled extensively on the importance of following up with CT surgery, he was very hesitant to undergo left heart catheterization and reported that he is a disdain for physicians  I did discuss with him outpatient cardiothoracic referral and he reports that he does not want any type of surgery      At this time mainstay of patient's treatment will be goal directed medical therapy  At the time of discharge the patient was tolerating oral diet they were without acute complaint and they were medically cleared for discharge  All questions were answered the patient's satisfaction and they were in agreement with the discharge plan  DISCHARGE DIAGNOSES  * NSTEMI (non-ST elevated myocardial infarction) [facility])  Assessment & Plan  Evidence of multivessel coronary disease  Will need outpatient follow-up discuss coronary bypass graft and cardiac rehabilitation  Received 48 hours of heparin infusion  Better controlled, asymptomatic      Coronary artery disease involving native coronary artery  Assessment & Plan  Patient with evidence of coronary disease involving multiple vessels    3V-CAD: severe lesions in the oLAD, oDIA,  oLCX, pOM, and the pRCA is a subtotal occlusion with robust collaterals from the left  •  LVEDP is normal without gradient on LV-AO pullback  •  Severe right Subclavian Artery stenosis noted as well  Patient to have outpatient CABG evaluation, unlikely to go to that appointment as patient does not want surgery  Treatment will be goal-directed medical therapy      History of CVA (cerebrovascular accident)  Assessment & Plan  W/residual numbness of left arm/leg  Resume statin, will asa load and then continue w/asa 81mg     Compliance with medication regimen  Assessment & Plan  Apparently stopped taking most of his meds 4 days ago per ed d/w pt's sister  Recommend medication compliance    HTN (hypertension)  Assessment & Plan  Restart toprol, lisinopril/norvasc      CONSULTING PROVIDERS   IP CONSULT TO CARDIOLOGY    PROCEDURES PERFORMED  Procedure(s) (LRB):  Cardiac catheterization (N/A)  Cardiac Coronary Angiogram (N/A)  Cardiac Left Heart Cath (Left)    RADIOLOGY RESULTS  CT chest wo contrast    Result Date: 6/20/2023    Impression: Normal caliber ascending aorta with mild calcification inferiorly  Mild emphysema       Cardiac catheterization    Addendum Date: 6/20/2023 Addendum:   •  3V-CAD: severe lesions in the oLAD, oDIA,  oLCX, pOM, and the pRCA is a subtotal occlusion with robust collaterals from the left •  LVEDP is normal without gradient on LV-AO pullback •  Severe right Subclavian Artery stenosis noted as well     Addendum Date: 6/20/2023 Addendum:   •  3V-CAD: severe lesions in the dLM, oLAD, oDIA,  oLCX, pOM, and the pRCA is a subtotal occlusion with robust collaterals from the left •  LVEDP is normal without gradient on LV-AO pullback •  Severe right Subclavian Artery stenosis noted as well     Result Date: 6/20/2023  Narrative: •  3V-CAD: severe lesions in the dLM, oLAD, oDIA,  oLCX, pOM, and the pRCA is a subtotal occlusion with robust collaterals from the left •  LVEDP is normal without gradient on LV-AO pullback •  Severe right Subclavian Artery stenosis noted as well •  Ost RCA to Prox RCA lesion is 99% stenosed  •  1st Mrg lesion is 90% stenosed  •  Prox Cx to Mid Cx lesion is 90% stenosed  •  1st Diag lesion is 85% stenosed  •  Mid LM to Dist LM lesion is 40% stenosed  •  Ost LAD lesion is 80% stenosed  Echo complete w/ contrast if indicated    Result Date: 6/19/2023  Narrative: •  Left Ventricle: Left ventricular cavity size is normal  Wall thickness is mildly increased  There is mild to moderate concentric hypertrophy  The left ventricular ejection fraction is 66% by single dimension measurement  Systolic function is normal  The inferior base is severely hypokinetic to akinetic  Diastolic function is mildly abnormal, consistent with grade I (abnormal) relaxation  Left atrial filling pressure is normal  •  Aortic Valve: The aortic valve is trileaflet  The leaflets are mildly thickened  The leaflets are moderately calcified  There is mildly reduced mobility  There is mild regurgitation  The aortic valve has no significant stenosis  The aortic valve peak velocity is 1 82 m/s   The aortic valve mean gradient is 8 mmHg  The dimensionless velocity index is 0 50  The aortic valve area is 1 86 cm2  The stroke volume index is 36 00 ml/m2  •  Tricuspid Valve: There is mild regurgitation  The right ventricular systolic pressure is normal  The estimated right ventricular systolic pressure is 65 06 mmHg  XR chest 1 view portable    Result Date: 6/19/2023    Impression: No acute cardiopulmonary disease        LABS  Results from last 7 days   Lab Units 06/22/23  0541 06/21/23  0526 06/19/23  0012   WBC Thousand/uL 7 89 7 89 10 19*   HEMOGLOBIN g/dL 11 2* 11 8* 12 9   HEMATOCRIT % 35 3* 36 2* 39 7   MCV fL 92 91 91   PLATELETS Thousands/uL 165 176 237   INR   --   --  0 99     Results from last 7 days   Lab Units 06/22/23  0541 06/21/23  0526 06/20/23  0618 06/19/23  0012   SODIUM mmol/L 139 138 140 143   POTASSIUM mmol/L 3 4* 3 7 3 8 3 7   CHLORIDE mmol/L 107 106 107 107   CO2 mmol/L 26 24 25 24   BUN mg/dL 13 11 13 14   CREATININE mg/dL 0 81 0 81 0 90 0 96   CALCIUM mg/dL 8 4 8 3* 8 7 8 9   ALBUMIN g/dL  --   --   --  4 3   TOTAL BILIRUBIN mg/dL  --   --   --  0 39   ALK PHOS U/L  --   --   --  56   ALT U/L  --   --   --  10   AST U/L  --   --   --  14   EGFR ml/min/1 73sq m 84 84 80 74   GLUCOSE RANDOM mg/dL 101 99 121 151*     Results from last 7 days   Lab Units 06/19/23  0421 06/19/23  0209 06/19/23  0012   HS TNI 0HR ng/L  --   --  102*   HS TNI 2HR ng/L  --  199*  --    HS TNI 4HR ng/L 425*  --   --                   Results from last 7 days   Lab Units 06/19/23  0421   HEMOGLOBIN A1C % 5 9*     Results from last 7 days   Lab Units 06/20/23  0618   TSH 3RD GENERATON uIU/mL 1 598         Results from last 7 days   Lab Units 06/20/23  0618   TRIGLYCERIDES mg/dL 142   CHOLESTEROL mg/dL 191   LDL CALC mg/dL 110*   HDL mg/dL 53       Cultures:   Results from last 7 days   Lab Units 06/19/23  0707   COLOR UA  Light Yellow   CLARITY UA  Clear   SPEC GRAV UA  1 016   PH UA  7 0   LEUKOCYTES UA  Negative   NITRITE UA  Negative "  GLUCOSE UA mg/dl 150 (3/20%)*   KETONES UA mg/dl 10 (1+)*   BILIRUBIN UA  Negative   BLOOD UA  Negative      Results from last 7 days   Lab Units 06/19/23  0707   RBC UA /hpf 4-10*   WBC UA /hpf 4-10*   EPITHELIAL CELLS WET PREP /hpf None Seen   BACTERIA UA /hpf None Seen            PHYSICAL EXAM:  Vitals:   Blood Pressure: 124/59 (06/22/23 1213)  Pulse: (!) 54 (06/22/23 1213)  Temperature: 97 9 °F (36 6 °C) (06/22/23 1213)  Temp Source: Temporal (06/22/23 1213)  Respirations: 18 (06/22/23 1213)  Height: 5' 10\" (177 8 cm) (06/19/23 1430)  Weight - Scale: 71 7 kg (158 lb) (06/19/23 1430)  SpO2: 99 % (06/22/23 1213)      General: well appearing, no acute distress  HEENT: atraumatic, PERRLA, moist mucosa, normal pharynx, normal tonsils and adenoids, normal tongue, no fluid in sinuses  Neck: Trachea midline, no carotid bruit, no masses  Respiratory: normal chest wall expansion, CTA B, no r/r/w, no rubs  Cardiovascular: RRR, no m/r/g, Normal S1 and S2  Abdomen: Soft, non-tender, non-distended, normal bowel sounds in all quadrants, no hepatosplenomegaly, no tympany  Rectal: deferred  Musculoskeletal: normal ROM in upper and lower extremities  Integumentary: warm, dry, and pink, with no rash, purpura, or petechia  Heme/Lymph: no lymphadenopathy, no bruises  Neurological: Cranial Nerves II-XII grossly intact, no tics, normal sensation to pressure and light touch  Psychiatric: cooperative with normal mood, affect, and cognition      Discharge Disposition: Home/Self Care      Test Results Pending at Discharge:           Medications   · Summary of Medication Adjustments made as a result of this hospitalization: See discharge list  · Medication Dosing Tapers - Please refer to Discharge Medication List for details on any medication dosing tapers (if applicable to patient)  · Discharge Medication List: See after visit summary for reconciled discharge medications       Diet restrictions:         Diet Orders   (From admission, " onward)             Start     Ordered    06/20/23 0809  Diet Cardiovascular; Cardiac  Diet effective now        References:    Adult Nutrition Support Algorithm    RD Therapeutic Diet Order Protocol   Question Answer Comment   Diet Type Cardiovascular    Cardiac Cardiac    RD to adjust diet per protocol? Yes        06/20/23 0808              Activity restrictions: No strenuous activity  Discharge Condition: good    Outpatient Follow-Up and Discharge Instructions  See after visit summary section titled Discharge Instructions for information provided to patient and family  Code Status: Level 1 - Full Code  Discharge Statement   I spent 35 minutes discharging the patient  This time was spent on the day of discharge  Greater than 50% of total time was spent with the patient and / or family counseling and / or coordination of care  ** Please Note: This note was completed in part utilizing M-Modal Fluency Direct Software  Grammatical errors, random word insertions, spelling mistakes, and incomplete sentences may be an occasional consequence of this system secondary to software limitations, ambient noise, and hardware issues  If you have any questions or concerns about the content, text, or information contained within the body of this dictation, please contact the provider for clarification  **

## 2023-06-22 NOTE — ASSESSMENT & PLAN NOTE
Patient with evidence of coronary disease involving multiple vessels    3V-CAD: severe lesions in the oLAD, oDIA,  oLCX, pOM, and the pRCA is a subtotal occlusion with robust collaterals from the left  •  LVEDP is normal without gradient on LV-AO pullback  •  Severe right Subclavian Artery stenosis noted as well  Patient to have outpatient CABG evaluation, unlikely to go to that appointment as patient does not want surgery  Treatment will be goal-directed medical therapy

## 2023-06-22 NOTE — ASSESSMENT & PLAN NOTE
Evidence of multivessel coronary disease  Will need outpatient follow-up discuss coronary bypass graft and cardiac rehabilitation  Received 48 hours of heparin infusion  Better controlled, asymptomatic

## 2023-06-23 ENCOUNTER — TELEPHONE (OUTPATIENT)
Dept: HEMATOLOGY ONCOLOGY | Facility: CLINIC | Age: 78
End: 2023-06-23

## 2023-06-23 NOTE — TELEPHONE ENCOUNTER
I called Wilton Opitz in response to a referral that was received for patient to establish care with Thoracic Surgery  Outreach was made to complete patient's intake questionnaire   I left a voicemail explaining the reason for my call and advised patient to call [facility] at [phone]  Another attempt will be made to contact patient

## 2023-06-26 ENCOUNTER — OFFICE VISIT (OUTPATIENT)
Dept: CARDIOLOGY CLINIC | Facility: CLINIC | Age: 78
End: 2023-06-26
Payer: COMMERCIAL

## 2023-06-26 VITALS
BODY MASS INDEX: 22.67 KG/M2 | HEIGHT: 70 IN | DIASTOLIC BLOOD PRESSURE: 62 MMHG | HEART RATE: 49 BPM | OXYGEN SATURATION: 97 % | SYSTOLIC BLOOD PRESSURE: 96 MMHG

## 2023-06-26 DIAGNOSIS — E78.00 HYPERCHOLESTEROLEMIA: ICD-10-CM

## 2023-06-26 DIAGNOSIS — I10 HYPERTENSION, UNSPECIFIED TYPE: ICD-10-CM

## 2023-06-26 DIAGNOSIS — R00.1 BRADYCARDIA, SINUS: ICD-10-CM

## 2023-06-26 DIAGNOSIS — I25.10 CORONARY ARTERY DISEASE INVOLVING NATIVE HEART WITHOUT ANGINA PECTORIS, UNSPECIFIED VESSEL OR LESION TYPE: Primary | ICD-10-CM

## 2023-06-26 PROCEDURE — 99215 OFFICE O/P EST HI 40 MIN: CPT | Performed by: NURSE PRACTITIONER

## 2023-06-26 RX ORDER — METOPROLOL SUCCINATE 25 MG/1
25 TABLET, EXTENDED RELEASE ORAL DAILY
Qty: 30 TABLET | Refills: 0 | Status: SHIPPED | OUTPATIENT
Start: 2023-06-26 | End: 2023-07-26

## 2023-06-26 NOTE — PROGRESS NOTES
Cardiology Follow Up    Tara Blood  1945  [id number]  [address]  [phone]    1  Coronary artery disease involving native heart without angina pectoris, unspecified vessel or lesion type        2  Bradycardia, sinus  metoprolol succinate (TOPROL-XL) 25 mg 24 hr tablet      3  Hypertension, unspecified type        4  Hypercholesterolemia            Interval History:   Mr Tara Blood was admitted to [facility] on 6/18 - 6/22/23 with a NSTEMI  He presented to the ED with sudden onset of CP, diaphoresis, nausea and SOB  Cardiology consulted  6/19/23 TTE: LVEF 66%, grade 1 abnormal relaxation  6/20/23 [facility] and found to have MVCAD:  Mid to distal LM 40% stenosis, Ost LAD 80% stenosis, 1st Diag 85% stenosis, LCx prox to mid 90% stenosis, 1st OM 90% stenosis, RCA 99% stenosis  RPDA 3rd filled by collaterals from 2nd sept  No lesions amenable to stenting  He did have robust collateral circulation  48 Hours of IV Hepain  OP follow up with CT surgery  Mr Tara Blood presents to our office for a recent hospitalization follow up visit  He is accompanied by his sister who lives with him  Sang Li denies CP, palpitations, lightheadedness or dizziness  He is refusing any type of intervention for CAD  Medical History   Hypertension  Hypercholesterolemia 6/20/23 , , HDL 53,   CVA with left sided deficits, numbness left arm   Pre DM HgbA1C 5 9 on 6/19/23     ?  Medication non compliance     Patient Active Problem List   Diagnosis   • Acute lacunar stroke (HCC)   • HTN (hypertension)   • Elevated troponin   • Hypercholesterolemia   • Prediabetes   • Lack of appetite   • Constipation   • Insomnia   • Anemia   • Fatigue   • NSTEMI (non-ST elevated myocardial infarction) ([facility])   • Compliance with medication regimen   • History of CVA (cerebrovascular accident)   • Coronary artery disease involving native coronary artery     Past Medical History:   Diagnosis Date   • Hypertension    • Stroke [facility])     2017     Social History     Socioeconomic History   • Marital status: Single     Spouse name: Not on file   • Number of children: Not on file   • Years of education: Not on file   • Highest education level: Not on file   Occupational History   • Not on file   Tobacco Use   • Smoking status: Former     Packs/day: 1 00     Types: Cigarettes     Start date: 1969     Quit date: 2005     Years since quittin 4   • Smokeless tobacco: Never   Vaping Use   • Vaping Use: Never used   Substance and Sexual Activity   • Alcohol use: Never   • Drug use: Never   • Sexual activity: Not Currently   Other Topics Concern   • Not on file   Social History Narrative   • Not on file     Social Determinants of Health     Financial Resource Strain: Not on file   Food Insecurity: Not on file   Transportation Needs: No Transportation Needs (2023)    PRAPARE - Transportation    • Lack of Transportation (Medical): No    • Lack of Transportation (Non-Medical): No   Physical Activity: Not on file   Stress: Not on file   Social Connections: Not on file   Intimate Partner Violence: Not on file   Housing Stability: Low Risk  (2023)    Housing Stability Vital Sign    • Unable to Pay for Housing in the Last Year: No    • Number of Places Lived in the Last Year: 1    • Unstable Housing in the Last Year: No      No family history on file  Past Surgical History:   Procedure Laterality Date   • CARDIAC CATHETERIZATION N/A 2023    Procedure: Cardiac catheterization;  Surgeon: Yesika Lezama DO;  Location: [facility] CARDIAC CATH LAB; Service: Cardiology   • CARDIAC CATHETERIZATION N/A 2023    Procedure: Cardiac Coronary Angiogram;  Surgeon: Yesika Lezama DO;  Location: [facility] CARDIAC [facility];   Service: Cardiology   • CARDIAC CATHETERIZATION Left 2023    Procedure: Cardiac Left Heart Cath;  Surgeon: Saige Thrasher DO;  Location: [facility] CARDIAC [facility]; Service: Cardiology   • EYE SURGERY         Current Outpatient Medications:   •  amLODIPine (NORVASC) 10 mg tablet, Take 1 tablet by mouth daily, Disp: , Rfl:   •  aspirin 81 mg chewable tablet, Chew 81 mg daily, Disp: , Rfl:   •  atorvastatin (LIPITOR) 80 mg tablet, Take 1 tablet (80 mg total) by mouth daily with dinner, Disp: 90 tablet, Rfl: 0  •  clopidogrel (PLAVIX) 75 mg tablet, Take 75 mg by mouth daily, Disp: , Rfl:   •  lisinopril (ZESTRIL) 40 mg tablet, Take 40 mg by mouth daily, Disp: , Rfl:   •  metoprolol succinate (TOPROL-XL) 25 mg 24 hr tablet, Take 1 tablet (25 mg total) by mouth daily Do not start before June 23, 2023 , Disp: 30 tablet, Rfl: 0  No Known Allergies    Labs:  No results displayed because visit has over 200 results  Imaging: CT chest wo contrast    Result Date: 6/20/2023  Narrative: CT CHEST WITHOUT IV CONTRAST INDICATION:   pre op CABG evaluation  COMPARISON: CXR 6/19/2023  TECHNIQUE: Chest CT without intravenous contrast   Axial, sagittal, coronal 2D reformats and coronal MIPS from source data  Radiation dose length product (DLP):  223 mGy-cm   Radiation dose exposure minimized using iterative reconstruction and automated exposure control  FINDINGS: LUNGS: Mild paraseptal emphysema  Minimal dependent atelectasis in the lower lobes  AIRWAYS: No significant filling defects  PLEURA:  Unremarkable  HEART/GREAT VESSELS: Moderate cardiomegaly  Severe coronary artery calcification indicating atherosclerotic heart disease  Normal caliber ascending aorta with mild calcification of its inferior aspect but no calcification of the remainder of the ascending aorta  Dense calcification of the right subclavian artery  MEDIASTINUM AND VICKY:  Unremarkable  CHEST WALL AND LOWER NECK: Unremarkable  UPPER ABDOMEN: Cholelithiasis   Large right renal cyst  OSSEOUS STRUCTURES: Mild degenerative disease in the spine      Impression: Normal caliber ascending aorta with mild calcification inferiorly  Mild emphysema  Workstation performed: [id number]     Cardiac catheterization    Addendum Date: 6/20/2023 Addendum:   •  3V-CAD: severe lesions in the oLAD, oDIA,  oLCX, pOM, and the pRCA is a subtotal occlusion with robust collaterals from the left •  LVEDP is normal without gradient on LV-AO pullback •  Severe right Subclavian Artery stenosis noted as well     Addendum Date: 6/20/2023 Addendum:   •  3V-CAD: severe lesions in the dLM, oLAD, oDIA,  oLCX, pOM, and the pRCA is a subtotal occlusion with robust collaterals from the left •  LVEDP is normal without gradient on LV-AO pullback •  Severe right Subclavian Artery stenosis noted as well     Result Date: 6/20/2023  Narrative: •  3V-CAD: severe lesions in the dLM, oLAD, oDIA,  oLCX, pOM, and the pRCA is a subtotal occlusion with robust collaterals from the left •  LVEDP is normal without gradient on LV-AO pullback •  Severe right Subclavian Artery stenosis noted as well •  Ost RCA to Prox RCA lesion is 99% stenosed  •  1st Mrg lesion is 90% stenosed  •  Prox Cx to Mid Cx lesion is 90% stenosed  •  1st Diag lesion is 85% stenosed  •  Mid LM to Dist LM lesion is 40% stenosed  •  Ost LAD lesion is 80% stenosed  Echo complete w/ contrast if indicated    Result Date: 6/19/2023  Narrative: •  Left Ventricle: Left ventricular cavity size is normal  Wall thickness is mildly increased  There is mild to moderate concentric hypertrophy  The left ventricular ejection fraction is 66% by single dimension measurement  Systolic function is normal  The inferior base is severely hypokinetic to akinetic  Diastolic function is mildly abnormal, consistent with grade I (abnormal) relaxation  Left atrial filling pressure is normal  •  Aortic Valve: The aortic valve is trileaflet  The leaflets are mildly thickened  The leaflets are moderately calcified  There is mildly reduced mobility   There is mild regurgitation  The aortic valve has no significant stenosis  The aortic valve peak velocity is 1 82 m/s  The aortic valve mean gradient is 8 mmHg  The dimensionless velocity index is 0 50  The aortic valve area is 1 86 cm2  The stroke volume index is 36 00 ml/m2  •  Tricuspid Valve: There is mild regurgitation  The right ventricular systolic pressure is normal  The estimated right ventricular systolic pressure is 57 20 mmHg  XR chest 1 view portable    Result Date: 6/19/2023  Narrative: CHEST INDICATION:   cp  COMPARISON: 3/20/2020 EXAM PERFORMED/VIEWS:  XR CHEST PORTABLE FINDINGS: Cardiomediastinal silhouette appears unremarkable  The lungs are clear  No pneumothorax or pleural effusion  Osseous structures appear within normal limits for patient age  Impression: No acute cardiopulmonary disease  Workstation performed: [id number]       Review of Systems:  Review of Systems   Musculoskeletal: Positive for arthralgias, gait problem and myalgias  Using a cane to assist with ambulation    Neurological: Positive for weakness  LLE   All other systems reviewed and are negative  Physical Exam:  Physical Exam  Vitals reviewed  Constitutional:       Appearance: Normal appearance  Cardiovascular:      Rate and Rhythm: Regular rhythm  Bradycardia present  Pulses: Normal pulses  Heart sounds: Normal heart sounds  Pulmonary:      Effort: Pulmonary effort is normal       Breath sounds: Normal breath sounds  Musculoskeletal:         General: Normal range of motion  Cervical back: Normal range of motion and neck supple  Right lower leg: No edema  Left lower leg: No edema  Skin:     General: Skin is warm and dry  Capillary Refill: Capillary refill takes less than 2 seconds  Neurological:      Mental Status: He is alert and oriented to person, place, and time  Mental status is at baseline  Motor: Weakness present        Comments: LLE   Psychiatric: Mood and Affect: Mood normal          Behavior: Behavior normal          Discussion/Summary:  # MVCAD 6/20/23 [facility] and found to have MVCAD:  Mid to distal LM 40% stenosis, Ost LAD 80% stenosis, 1st Diag 85% stenosis, LCx prox to mid 90% stenosis, 1st OM 90% stenosis, RCA 99% stenosis  RPDA 3rd filled by collaterals from 2nd sept  He was referred to CT surgery  Yessi Coffee is refusing any type of intervention for CAD  He denies CP and will continue with medical management  # Sinus Bradycardia HR 49 BPM, decrease Metoprolol succinate from 25mg daily to 12 5mg daily, continue on ASA 81mg daily, Amlodipine 10mg daily, Lipirot 80mg daily , Plavix 75mg daily, Lisinopril 40mg daily, DASH diet   # Hypertension RUE sitting 100/60 decrease Metoprolol succinate from 25mg daily to 12 5mg daily, continue on Lisinopril 40mg daily, Amlodipine 10mg daily, DASH diet   # Hypercholesterolemia 6/20/23 , , HDL 53,     Continue on Lipitor 80mg daily heart healthy diet

## 2023-06-28 ENCOUNTER — TELEPHONE (OUTPATIENT)
Dept: HEMATOLOGY ONCOLOGY | Facility: CLINIC | Age: 78
End: 2023-06-28

## 2023-06-28 NOTE — TELEPHONE ENCOUNTER
I called Kimberly Gomez in response to a referral that was received for patient to establish care with Thoracic Surgery  Outreach was made to complete patient's intake questionnaire   I left a voicemail explaining the reason for my call and advised patient to call [facility] at [phone]  Another attempt will be made to contact patient

## 2023-06-29 ENCOUNTER — TELEPHONE (OUTPATIENT)
Dept: HEMATOLOGY ONCOLOGY | Facility: CLINIC | Age: 78
End: 2023-06-29

## 2023-06-29 NOTE — TELEPHONE ENCOUNTER
I called Lam Han in response to a referral that was received for patient to establish care with Thoracic Surgery  Outreach was made to complete patient's intake questionnaire   I left a voicemail explaining the reason for my call and advised patient to call [facility] at [phone]  This is the third attempt to schedule patient unsuccessfully  The referral has been closed, a [employer] message has been sent to patient (if applicable) and a letter has been sent to the address on file

## 2023-07-07 ENCOUNTER — TELEPHONE (OUTPATIENT)
Dept: HEMATOLOGY ONCOLOGY | Facility: CLINIC | Age: 78
End: 2023-07-07

## 2023-07-07 NOTE — TELEPHONE ENCOUNTER
Patient Call    Who are you speaking with? Sister    If it is not the patient, are they listed on an active communication consent form? Yes   What is the reason for this call? Tegan calling in regarding the letter they received that we had been trying to reach the patient. I informed Alvarado Fermin we had been trying to reach him regarding a referral for Thoracic. Alvarado Fermin states the patient is not interested in scheduling. Does this require a call back? No   If a call back is required, please list best call back number N/A   If a call back is required, advise that a message will be forwarded to their care team and someone will return their call as soon as possible. Did you relay this information to the patient?  No

## 2025-05-21 NOTE — ASSESSMENT & PLAN NOTE
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 Please resume your weekly regimen of 7.5mg daily. Follow up next Tuesday.  If any questions arise do not hesitate to call us at [phone] M-F from 8:30am-4:30pm or at   [phone] after 5pm or on the weekends. Continue to monitor for any excess bleeding or bruising, especially for blood in your stool.

## (undated) DEVICE — CATH DIAG 5FR IMPULSE 100CM FR4

## (undated) DEVICE — PINNACLE INTRODUCER SHEATH: Brand: PINNACLE

## (undated) DEVICE — GLIDESHEATH SLENDER STAINLESS STEEL KIT: Brand: GLIDESHEATH SLENDER

## (undated) DEVICE — RADIFOCUS OPTITORQUE ANGIOGRAPHIC CATHETER: Brand: OPTITORQUE

## (undated) DEVICE — CATH DIAG 5FR IMPULSE 100CM FL4

## (undated) DEVICE — Device: Brand: ASAHI SILVERWAY

## (undated) DEVICE — RADIFOCUS GLIDEWIRE: Brand: GLIDEWIRE

## (undated) DEVICE — Device: Brand: PROWATER

## (undated) DEVICE — DGW .035 FC J3MM 260CM TEF: Brand: EMERALD

## (undated) DEVICE — MICROPUNCTURE INTRODUCER SET SILHOUETTE TRANSITIONLESS PUSH-PLUS DESIGN - STIFFENED CANNULA WITH NITINOL WIRE GUIDE: Brand: MICROPUNCTURE